# Patient Record
Sex: MALE | Race: WHITE | NOT HISPANIC OR LATINO | Employment: OTHER | URBAN - METROPOLITAN AREA
[De-identification: names, ages, dates, MRNs, and addresses within clinical notes are randomized per-mention and may not be internally consistent; named-entity substitution may affect disease eponyms.]

---

## 2017-01-17 ENCOUNTER — OFFICE VISIT (OUTPATIENT)
Dept: ORTHOPEDIC SURGERY | Facility: CLINIC | Age: 57
End: 2017-01-17

## 2017-01-17 DIAGNOSIS — Z96.651 STATUS POST TOTAL RIGHT KNEE REPLACEMENT: Primary | ICD-10-CM

## 2017-01-17 PROCEDURE — 99024 POSTOP FOLLOW-UP VISIT: CPT | Performed by: ORTHOPAEDIC SURGERY

## 2017-01-17 RX ORDER — AMLODIPINE BESYLATE AND BENAZEPRIL HYDROCHLORIDE 10; 40 MG/1; MG/1
1 CAPSULE ORAL
COMMUNITY
End: 2017-12-28 | Stop reason: SDUPTHER

## 2017-01-17 NOTE — LETTER
January 17, 2017     Lalito Hansen MD  [address] [facility] [address]    Patient: Bam Negron   YOB: 1960   Date of Visit: 1/17/2017       Dear Dr. Tyrone MD:    Thank you for referring Bam Negron to me for evaluation. Below are the relevant portions of my assessment and plan of care.    If you have questions, please do not hesitate to call me. I look forward to following Bam along with you.         Sincerely,        Roger Stone MD        CC: No Recipients  Roger Stone MD  1/17/2017 11:07 AM  Signed  CC: s/p right total knee arthroplasty, DOS 10/17/2016  Interval History: Bam Negrno returns for 12 week postoperative visit.  He is doing well. Pain is controlled with minimal issues, mild residual pain over anterior knee and lateral retinaculum, rates as 1-2/10, intermittent. No pain medication needed.  He denies any wound problem, fevers, or chills. Patient is continuing to work with outpatient PT. Ambulating without cane.     Physical Examination: Right knee was examined   Incision well healed   ROM 0-125,  4+/5 strength   Stable to varus and valgus stress   Flex/extend ankle and toes   Positive sensation right foot   No calf pain, negative Homans sign bilaterally      Assessment/Plan:  Bam Negron is recovering from surgery as expected.  We will continue home exercises for range of motion, strengthening, and gait normalization. .  He is to follow up in clinic in 3 months with xrays. Patient had all question answered today. Discussed need for prophylactic antibiotics with dental/endoscopy procedures.     Medications:  No orders of the defined types were placed in this encounter.      Roger Stone MD  1/17/2017 11:01 AM

## 2017-01-17 NOTE — MR AVS SNAPSHOT
Bam Negron   1/17/2017 10:30 AM   Office Visit    Dept Phone:  [phone]   Encounter #:  [id number]    Provider:  Roger Stone MD   Department:  [facility] ORTHOPEDICS                Your Full Care Plan              Your Updated Medication List          This list is accurate as of: 1/17/17 11:10 AM.  Always use your most recent med list.                albuterol 108 (90 BASE) MCG/ACT inhaler   Commonly known as:  PROVENTIL HFA;VENTOLIN HFA   Inhale 2 puffs every 4 (four) hours as needed for wheezing.       amLODIPine-benazepril 10-40 MG per capsule   Commonly known as:  LOTREL       aspirin 81 MG EC tablet       atorvastatin 40 MG tablet   Commonly known as:  LIPITOR   Take 1 tablet by mouth Every Night for 30 days.       carvedilol 12.5 MG tablet   Commonly known as:  COREG   Take 1 tablet by mouth 2 (Two) Times a Day With Meals for 90 days. Indications: Disease of the Muscle of the Heart       DULoxetine 60 MG capsule   Commonly known as:  CYMBALTA   Take 1 capsule by mouth Every Night for 90 days. Indications: Musculoskeletal Pain       glipiZIDE 10 MG tablet   Commonly known as:  GLUCOTROL   Take 1 tablet by mouth 2 (Two) Times a Day for 90 days. Indications: Type 2 Diabetes       glucose blood test strip   Use as instructed to test blood sugar 3 times daily.       Insulin Pen Needle 31G X 5 MM misc       Liraglutide 18 MG/3ML solution pen-injector   Commonly known as:  VICTOZA   Inject 1.8 mg under the skin Daily for 30 days.       melatonin 5 MG tablet tablet       metFORMIN 1000 MG tablet   Commonly known as:  GLUCOPHAGE   Take 1 tablet by mouth 2 (Two) Times a Day With Meals for 90 days. TAKE WITH FOOD   Indications: Type 2 Diabetes       ONETOUCH DELICA LANCETS 33G misc   Use as directed to test blood sugar 3 times daily.       predniSONE 10 MG tablet   Commonly known as:  DELTASONE   Take 1 tablet by mouth 2 (Two) Times a Day.       QC MULTI-DEYSI 50  & OVER PO               You Were Diagnosed With        Codes Comments    Status post total right knee replacement    -  Primary ICD-10-CM: Z96.651  ICD-9-CM: V43.65       Instructions     None    Patient Instructions History      Upcoming Appointments     Visit Type Date Time Department    FOLLOW UP 1/17/2017 10:30 AM [facility]    FOLLOW UP 4/18/2017 10:45 AM [facility]    [facility] 6/13/2017  8:20 AM [facility]    FOLLOW UP 6/20/2017  2:15 PM [facility]      MyChart Signup     Our records indicate that you have an active [facility] [employer] account.    You can view your After Visit Summary by going to [employer] and logging in with your [employer] username and password.  If you don't have a [employer] username and password but a parent or guardian has access to your record, the parent or guardian should login with their own [employer] username and password and access your record to view the After Visit Summary.    If you have questions, you can email [email or web address] or call [phone] to talk to our [employer] staff.  Remember, [employer] is NOT to be used for urgent needs.  For medical emergencies, dial 911.               Other Info from Your Visit           Your Appointments     Apr 18, 2017 10:45 AM EDT   Follow Up with Roger Stone MD   [facility] (--)    [address]  [facility] [id number] [phone]           Arrive 15 minutes prior to appointment.            Jun 13, 2017  8:20 AM EDT   [facility]   [facility] (--)    [address]  [place] [address] [phone]            Jun 20, 2017  2:15 PM EDT   Follow Up with Lalito Hansen MD   [facility] (--)    [address]  [place] [id number] [phone]           Arrive 15 minutes prior to appointment.              Allergies     No  Known Allergies      Reason for Visit     Right Knee - Follow-up           Vital Signs     Smoking Status                   Former Smoker           Problems and Diagnoses Noted     Status post total right knee replacement

## 2017-01-17 NOTE — PROGRESS NOTES
CC: s/p right total knee arthroplasty, DOS 10/17/2016  Interval History: Bam Negron returns for 12 week postoperative visit.  He is doing well. Pain is controlled with minimal issues, mild residual pain over anterior knee and lateral retinaculum, rates as 1-2/10, intermittent. No pain medication needed.  He denies any wound problem, fevers, or chills. Patient is continuing to work with outpatient PT. Ambulating without cane.     Physical Examination: Right knee was examined   Incision well healed   ROM 0-125,  4+/5 strength   Stable to varus and valgus stress   Flex/extend ankle and toes   Positive sensation right foot   No calf pain, negative Homans sign bilaterally      Assessment/Plan:  Bam Negron is recovering from surgery as expected.  We will continue home exercises for range of motion, strengthening, and gait normalization. .  He is to follow up in clinic in 3 months with xrays. Patient had all question answered today. Discussed need for prophylactic antibiotics with dental/endoscopy procedures.     Medications:  No orders of the defined types were placed in this encounter.      Roger Stone MD  1/17/2017 11:01 AM

## 2017-03-23 ENCOUNTER — DOCUMENTATION (OUTPATIENT)
Dept: PHYSICAL THERAPY | Facility: HOSPITAL | Age: 57
End: 2017-03-23

## 2017-03-23 DIAGNOSIS — Z96.651 STATUS POST TOTAL KNEE REPLACEMENT, RIGHT: Primary | ICD-10-CM

## 2017-03-23 NOTE — THERAPY DISCHARGE NOTE
Outpatient Physical Therapy Ortho Progress Note/Discharge Summary       Patient Name: Bam Negron  : 1960  MRN: [id number]  Today's Date: 3/23/2017      Visit Date: 2017    Visit Dx:    ICD-10-CM ICD-9-CM   1. Status post total knee replacement, right Z96.651 V43.65       Patient Active Problem List   Diagnosis   • Atopic rhinitis   • Cataract   • Simple chronic bronchitis   • Chronic coronary artery disease   • Depression   • Mixed hyperlipidemia   • Essential hypertension   • Hypogonadism in male   • Macular degeneration   • Obesity (BMI 30-39.9)   • Obstructive sleep apnea syndrome   • Type 2 diabetes mellitus without complication, without long-term current use of insulin   • Primary osteoarthritis of both knees   • Old myocardial infarction   • Allergic rhinitis due to animal hair and dander   • Medication monitoring encounter   • Abnormal electrocardiogram   • Premature atrial contraction   • Diastolic dysfunction   • Encounter for pre-operative cardiovascular clearance   • Status post total right knee replacement   • Lumbar herniated disc   • Acute blood loss as cause of postoperative anemia   • Screening for prostate cancer        Past Medical History:   Diagnosis Date   • Anxiety    • Asthma    • Blood type O+ 10/04/2016    By lab confirmation   • Cervical disc disorder    • Coronary artery disease     Cardiac catheterization 2011 severe single coronary artery disease of the left anterior descending completely occluded with good collateralization and also showed moderate left ventricular diastolic dysfunction   • Depression    • Diabetes mellitus    • Diastolic dysfunction     Demonstrated on cardiac cath    • History of broken collarbone     bilateral   • Hypertension    • Hypogonadism male    • Left knee DJD    • Lumbar herniated disc     L3 & L5, h/o lumbar epidurals   • Myocardial infarction     Anterior MI per electrocardiogram, Dr Baron follows   • Nose  abnormality     not sure what, but told needs surgery to correct, Dr Thompson (ENT)   • Obstructive sleep apnea on CPAP 07/09/2013    Sleep Study completed by Dr. Bennett   • Right knee DJD     sched replacement   • Sciatic pain     right leg   • Small bowel obstruction 2013        Past Surgical History:   Procedure Laterality Date   • CARDIAC CATHETERIZATION  12/27/2011   • CERVICAL FUSION  12/31/2014    cervical fusion   • COLONOSCOPY  03/2010   • COLONOSCOPY N/A 9/23/2016    Procedure: COLONOSCOPY with polypectomy/tattooing;  Surgeon: Jonna Osborn MD;  Location:  [facility];  Service:    • EPIDURAL      lumbar epidurals   • PILONIDAL CYSTECTOMY      x4   • TX TOTAL KNEE ARTHROPLASTY Right 10/17/2016    Procedure: TOTAL KNEE ARTHROPLASTY  KRIS--ANTIBIOTIC CEMENT ;  Surgeon: Roger Stone MD;  Location:  [facility] OR;  Service: Orthopedics                             PT Assessment/Plan       03/23/17 0500       PT Assessment    Assessment Comments Pt is doing well with all goals met.   -GC     PT Plan    PT Plan Comments Pt is to continue his HEP 3-4x weekly for maintenance purposes.  -       User Key  (r) = Recorded By, (t) = Taken By, (c) = Cosigned By    Initials Name Provider Type    EV Flores, PT Physical Therapist                                       PT OP Goals       03/23/17 0500       PT Short Term Goals    STG Date to Achieve 11/23/16  -GC     STG 1 Decrease right knee pain to 2-3/10 with activity.  -GC     STG 1 Progress Met  -GC     STG 2 Increase right knee ROM to 5-110 degrees with testing.  -GC     STG 2 Progress Met  -GC     STG 3 Decrease right knee edema to minimal level.  -GC     STG 3 Progress Met  -GC     STG 4 Pt will be independent with his HEP.  -     STG 4 Progress Met  -     Long Term Goals    LTG Date to Achieve 12/14/16  -GC     LTG 1 Decrease right knee pain to 0-1/10 with activity.  -     LTG 1 Progress Met  -     LTG 2 Increase right knee ROM to 0-120 degrees with  testing.  -GC     LTG 2 Progress Partially Met  -GC     LTG 3 Increase right LE strength to 5/5 all planes with testing.  -GC     LTG 3 Progress Met  -GC     LTG 4 Restore normal gait on levels and stairs without assistive device.  -GC     LTG 4 Progress Met  -GC     LTG 5 Pt will be independent with all ADLs and have a LEFS score > 60.  -     LTG 5 Progress Partially Met  -GC       User Key  (r) = Recorded By, (t) = Taken By, (c) = Cosigned By    Initials Name Provider Type    EV Flores, PT Physical Therapist                    Time Calculation:                  OP PT Discharge Summary  Reason for Discharge: All goals achieved  Outcomes Achieved: Able to achieve all goals within established timeline  Discharge Destination: Home with home program      Kishor Flores, PT  3/23/2017

## 2017-05-13 ENCOUNTER — RESULTS ENCOUNTER (OUTPATIENT)
Dept: FAMILY MEDICINE CLINIC | Facility: CLINIC | Age: 57
End: 2017-05-13

## 2017-05-13 DIAGNOSIS — Z51.81 MEDICATION MONITORING ENCOUNTER: ICD-10-CM

## 2017-05-13 DIAGNOSIS — I10 ESSENTIAL HYPERTENSION: ICD-10-CM

## 2017-05-13 DIAGNOSIS — Z12.5 SCREENING FOR PROSTATE CANCER: ICD-10-CM

## 2017-05-13 DIAGNOSIS — E11.9 TYPE 2 DIABETES MELLITUS WITHOUT COMPLICATION, WITHOUT LONG-TERM CURRENT USE OF INSULIN (HCC): ICD-10-CM

## 2017-05-13 DIAGNOSIS — E78.2 MIXED HYPERLIPIDEMIA: ICD-10-CM

## 2017-05-13 DIAGNOSIS — D62 ACUTE BLOOD LOSS AS CAUSE OF POSTOPERATIVE ANEMIA: ICD-10-CM

## 2017-06-14 LAB
ALT SERPL-CCNC: 26 U/L (ref 5–41)
APPEARANCE UR: CLEAR
BILIRUB UR QL STRIP: NEGATIVE
BUN SERPL-MCNC: 16 MG/DL (ref 6–20)
BUN/CREAT SERPL: 22.5 (ref 7–25)
CALCIUM SERPL-MCNC: 9 MG/DL (ref 8.6–10.5)
CHLORIDE SERPL-SCNC: 98 MMOL/L (ref 98–107)
CHOLEST SERPL-MCNC: 158 MG/DL (ref 0–200)
CO2 SERPL-SCNC: 27.5 MMOL/L (ref 22–29)
COLOR UR: YELLOW
CREAT SERPL-MCNC: 0.71 MG/DL (ref 0.76–1.27)
ERYTHROCYTE [DISTWIDTH] IN BLOOD BY AUTOMATED COUNT: 13.4 % (ref 11.5–14.5)
GLUCOSE SERPL-MCNC: 101 MG/DL (ref 65–99)
GLUCOSE UR QL: NEGATIVE
HBA1C MFR BLD: 7.2 % (ref 4.8–5.6)
HCT VFR BLD AUTO: 40.2 % (ref 42–52)
HDLC SERPL-MCNC: 41 MG/DL (ref 40–60)
HGB BLD-MCNC: 13.8 G/DL (ref 14–18)
HGB UR QL STRIP: NEGATIVE
KETONES UR QL STRIP: NEGATIVE
LDLC SERPL CALC-MCNC: 83 MG/DL (ref 0–100)
LEUKOCYTE ESTERASE UR QL STRIP: NEGATIVE
MCH RBC QN AUTO: 30.5 PG (ref 27–31)
MCHC RBC AUTO-ENTMCNC: 34.3 G/DL (ref 31–37)
MCV RBC AUTO: 88.9 FL (ref 80–94)
MICROALBUMIN UR-MCNC: 4.2 UG/ML
NITRITE UR QL STRIP: NEGATIVE
PH UR STRIP: 7 [PH] (ref 4.5–8)
PLATELET # BLD AUTO: 322 10*3/MM3 (ref 140–500)
POTASSIUM SERPL-SCNC: 4.1 MMOL/L (ref 3.5–5.2)
PROT UR QL STRIP: NEGATIVE
PSA SERPL-MCNC: 0.17 NG/ML (ref 0–4)
RBC # BLD AUTO: 4.52 10*6/MM3 (ref 4.7–6.1)
SODIUM SERPL-SCNC: 139 MMOL/L (ref 136–145)
SP GR UR: 1.02 (ref 1–1.03)
TRIGL SERPL-MCNC: 170 MG/DL (ref 0–150)
TSH SERPL DL<=0.005 MIU/L-ACNC: 0.98 MIU/ML (ref 0.27–4.2)
UROBILINOGEN UR STRIP-MCNC: NORMAL MG/DL
VLDLC SERPL CALC-MCNC: 34 MG/DL (ref 8–32)
WBC # BLD AUTO: 7.82 10*3/MM3 (ref 4.8–10.8)

## 2017-06-20 ENCOUNTER — OFFICE VISIT (OUTPATIENT)
Dept: FAMILY MEDICINE CLINIC | Facility: CLINIC | Age: 57
End: 2017-06-20

## 2017-06-20 VITALS
WEIGHT: 251.8 LBS | SYSTOLIC BLOOD PRESSURE: 140 MMHG | DIASTOLIC BLOOD PRESSURE: 80 MMHG | HEIGHT: 68 IN | OXYGEN SATURATION: 95 % | BODY MASS INDEX: 38.16 KG/M2 | HEART RATE: 84 BPM | TEMPERATURE: 98 F

## 2017-06-20 DIAGNOSIS — E11.9 TYPE 2 DIABETES MELLITUS WITHOUT COMPLICATION, WITHOUT LONG-TERM CURRENT USE OF INSULIN (HCC): ICD-10-CM

## 2017-06-20 DIAGNOSIS — I10 ESSENTIAL HYPERTENSION: ICD-10-CM

## 2017-06-20 DIAGNOSIS — E78.2 MIXED HYPERLIPIDEMIA: Primary | ICD-10-CM

## 2017-06-20 DIAGNOSIS — Z51.81 MEDICATION MONITORING ENCOUNTER: ICD-10-CM

## 2017-06-20 PROCEDURE — 99213 OFFICE O/P EST LOW 20 MIN: CPT | Performed by: FAMILY MEDICINE

## 2017-06-20 RX ORDER — CARVEDILOL 12.5 MG/1
TABLET ORAL 2 TIMES DAILY
COMMUNITY
Start: 2017-05-14 | End: 2017-12-28 | Stop reason: DRUGHIGH

## 2017-06-20 RX ORDER — NAPROXEN SODIUM 220 MG
440 TABLET ORAL
COMMUNITY

## 2017-06-20 NOTE — PROGRESS NOTES
"  Chief Complaint   Patient presents with   • Follow-up   • Hyperlipidemia   • Hypertension   • Diabetes       Subjective   Bam Negron is an 56 y.o. male who presents for follow up of diabetes. Current symptoms include: hyperglycemia. Patient denies foot ulcerations, hypoglycemia , increased appetite, nausea, paresthesia of the feet, polydipsia, polyuria, visual disturbances, vomiting and weight loss. Evaluation to date has included: fasting blood sugar, fasting lipid panel, hemoglobin A1C and microalbuminuria. Home sugars: patient does not check sugars. Current treatments: more intensive attention to diet which has been not very effective, low cholesterol diet which has been not very effective, increased aerobic exercise which has been not very effective, Continued sulfonylurea which has been somewhat effective, Continued metformin which has been somewhat effective, Continued statin which has been somewhat effective and Continued ACE inhibitor/ARB which has been somewhat effective. Last dilated eye exam 2016.    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      The following portions of the patient's history were reviewed and updated as appropriate: allergies, current medications, past family history, past medical history, past social history, past surgical history and problem list.        Review of Systems  Pertinent items are noted in HPI.     Vitals:    06/20/17 1418   BP: 140/80   BP Location: Left arm   Patient Position: Sitting   Pulse: 84   Temp: 98 °F (36.7 °C)   SpO2: 95%   Weight: 251 lb 12.8 oz (114 kg)   Height: 68\" (172.7 cm)       Objective    Gen:  Alert, pleasant  Ears: canals clear, TMs normal  Throat: clear , no thrush, teeth ok  Neck: no bruit, no LAD  Lungs: clear  Heart: RR no murmur  Feet:  No rash, no skin breakdown, sensation grossly normal.    Laboratory:  Results for orders placed or performed in visit " on 05/13/17   MicroAlbumin, Urine, Random   Result Value Ref Range    Microalbumin, Urine 4.2 Not Estab. ug/mL   ALT   Result Value Ref Range    ALT (SGPT) 26 5 - 41 U/L   Lipid Panel   Result Value Ref Range    Total Cholesterol 158 0 - 200 mg/dL    Triglycerides 170 (H) 0 - 150 mg/dL    HDL Cholesterol 41 40 - 60 mg/dL    VLDL Cholesterol 34 (H) 8 - 32 mg/dL    LDL Cholesterol  83 0 - 100 mg/dL   PSA   Result Value Ref Range    PSA 0.169 0.000 - 4.000 ng/mL   Basic Metabolic Panel   Result Value Ref Range    Glucose 101 (H) 65 - 99 mg/dL    BUN 16 6 - 20 mg/dL    Creatinine 0.71 (L) 0.76 - 1.27 mg/dL    eGFR Non African Am 115 >60 mL/min/1.73    eGFR African Am 139 >60 mL/min/1.73    BUN/Creatinine Ratio 22.5 7.0 - 25.0    Sodium 139 136 - 145 mmol/L    Potassium 4.1 3.5 - 5.2 mmol/L    Chloride 98 98 - 107 mmol/L    Total CO2 27.5 22.0 - 29.0 mmol/L    Calcium 9.0 8.6 - 10.5 mg/dL   CBC (No Diff)   Result Value Ref Range    WBC 7.82 4.80 - 10.80 10*3/mm3    RBC 4.52 (L) 4.70 - 6.10 10*6/mm3    Hemoglobin 13.8 (L) 14.0 - 18.0 g/dL    Hematocrit 40.2 (L) 42.0 - 52.0 %    MCV 88.9 80.0 - 94.0 fL    MCH 30.5 27.0 - 31.0 pg    MCHC 34.3 31.0 - 37.0 g/dL    RDW 13.4 11.5 - 14.5 %    Platelets 322 140 - 500 10*3/mm3   TSH   Result Value Ref Range    TSH 0.978 0.270 - 4.200 mIU/mL   Hemoglobin A1c   Result Value Ref Range    Hemoglobin A1C 7.20 (H) 4.80 - 5.60 %   Urinalysis With / Microscopic If Indicated   Result Value Ref Range    Specific Gravity, UA 1.020 1.003 - 1.030    pH, UA 7.0 4.5 - 8.0    Color, UA Yellow     Appearance, UA Clear Clear    Leukocytes, UA Negative Negative    Protein Negative Negative    Glucose, UA Negative Negative    Ketones Negative     Blood, UA Negative Negative    Bilirubin, UA Negative Negative    Urobilinogen, UA Comment     Nitrite, UA Negative Negative        Assessment/Plan        Discussed general issues about diabetes pathophysiology and management.  Addressed ADA diet.  Suggested  low cholesterol diet.  Encouraged aerobic exercise.  Discussed foot care.  Reminded to get yearly retinal exam.  Continued sulfonylurea; see medication orders.   Continued metformin; see  medication orders.  Continued statin drug see medication orders.  Continued ACE inhibitor; see medication orders.  Follow up in 6 months or as needed.    Bam was seen today for follow-up, hyperlipidemia, hypertension and diabetes.    Diagnoses and all orders for this visit:    Mixed hyperlipidemia  -     Lipid Panel; Future    Essential hypertension    Type 2 diabetes mellitus without complication, without long-term current use of insulin  -     MicroAlbumin, Urine, Random; Future  -     Basic Metabolic Panel; Future  -     Hemoglobin A1c; Future    Medication monitoring encounter  -     ALT; Future        Return in about 6 months (around 12/20/2017) for diabetes, Annual physical, blood pressure.  There are no Patient Instructions on file for this visit.  Medications Discontinued During This Encounter   Medication Reason   • predniSONE (DELTASONE) 10 MG tablet          Dr. Lalito Hansen MD  [place], [place]  [facility].

## 2017-10-11 RX ORDER — AMLODIPINE BESYLATE AND BENAZEPRIL HYDROCHLORIDE 10; 40 MG/1; MG/1
CAPSULE ORAL
Qty: 90 CAPSULE | Refills: 0 | Status: SHIPPED | OUTPATIENT
Start: 2017-10-11 | End: 2017-12-28 | Stop reason: SDUPTHER

## 2017-10-20 RX ORDER — ATORVASTATIN CALCIUM 40 MG/1
TABLET, FILM COATED ORAL
Qty: 90 TABLET | Refills: 0 | Status: SHIPPED | OUTPATIENT
Start: 2017-10-20 | End: 2017-12-28 | Stop reason: DRUGHIGH

## 2017-11-20 ENCOUNTER — RESULTS ENCOUNTER (OUTPATIENT)
Dept: FAMILY MEDICINE CLINIC | Facility: CLINIC | Age: 57
End: 2017-11-20

## 2017-11-20 DIAGNOSIS — Z51.81 MEDICATION MONITORING ENCOUNTER: ICD-10-CM

## 2017-11-20 DIAGNOSIS — E78.2 MIXED HYPERLIPIDEMIA: ICD-10-CM

## 2017-11-20 DIAGNOSIS — E11.9 TYPE 2 DIABETES MELLITUS WITHOUT COMPLICATION, WITHOUT LONG-TERM CURRENT USE OF INSULIN (HCC): ICD-10-CM

## 2017-12-08 LAB
ALT SERPL-CCNC: 25 U/L (ref 5–41)
BUN SERPL-MCNC: 13 MG/DL (ref 6–20)
BUN/CREAT SERPL: 16.3 (ref 7–25)
CALCIUM SERPL-MCNC: 8.8 MG/DL (ref 8.6–10.5)
CHLORIDE SERPL-SCNC: 102 MMOL/L (ref 98–107)
CHOLEST SERPL-MCNC: 122 MG/DL (ref 0–200)
CO2 SERPL-SCNC: 30.4 MMOL/L (ref 22–29)
CREAT SERPL-MCNC: 0.8 MG/DL (ref 0.76–1.27)
GFR SERPLBLD CREATININE-BSD FMLA CKD-EPI: 100 ML/MIN/1.73
GFR SERPLBLD CREATININE-BSD FMLA CKD-EPI: 121 ML/MIN/1.73
GLUCOSE SERPL-MCNC: 138 MG/DL (ref 65–99)
HBA1C MFR BLD: 6.8 % (ref 4.8–5.6)
HDLC SERPL-MCNC: 40 MG/DL (ref 40–60)
LDLC SERPL CALC-MCNC: 54 MG/DL (ref 0–100)
MICROALBUMIN UR-MCNC: 8.8 UG/ML
POTASSIUM SERPL-SCNC: 4.2 MMOL/L (ref 3.5–5.2)
SODIUM SERPL-SCNC: 143 MMOL/L (ref 136–145)
TRIGL SERPL-MCNC: 139 MG/DL (ref 0–150)
VLDLC SERPL CALC-MCNC: 27.8 MG/DL (ref 8–32)

## 2017-12-12 DIAGNOSIS — E11.9 TYPE 2 DIABETES MELLITUS WITHOUT COMPLICATION, WITHOUT LONG-TERM CURRENT USE OF INSULIN (HCC): ICD-10-CM

## 2017-12-12 DIAGNOSIS — M51.26 LUMBAR HERNIATED DISC: ICD-10-CM

## 2017-12-12 RX ORDER — DULOXETIN HYDROCHLORIDE 60 MG/1
CAPSULE, DELAYED RELEASE ORAL
Qty: 90 CAPSULE | Refills: 0 | Status: SHIPPED | OUTPATIENT
Start: 2017-12-12 | End: 2017-12-28 | Stop reason: SDUPTHER

## 2017-12-20 DIAGNOSIS — E11.9 TYPE 2 DIABETES MELLITUS WITHOUT COMPLICATION, WITHOUT LONG-TERM CURRENT USE OF INSULIN (HCC): ICD-10-CM

## 2017-12-20 RX ORDER — GLIPIZIDE 10 MG/1
TABLET ORAL
Qty: 30 TABLET | Refills: 0 | Status: SHIPPED | OUTPATIENT
Start: 2017-12-20 | End: 2017-12-28 | Stop reason: SDUPTHER

## 2017-12-28 ENCOUNTER — OFFICE VISIT (OUTPATIENT)
Dept: FAMILY MEDICINE CLINIC | Facility: CLINIC | Age: 57
End: 2017-12-28

## 2017-12-28 VITALS
SYSTOLIC BLOOD PRESSURE: 120 MMHG | OXYGEN SATURATION: 95 % | HEART RATE: 89 BPM | BODY MASS INDEX: 38.12 KG/M2 | TEMPERATURE: 98.3 F | WEIGHT: 251.5 LBS | HEIGHT: 68 IN | DIASTOLIC BLOOD PRESSURE: 78 MMHG

## 2017-12-28 DIAGNOSIS — Z12.5 SCREENING FOR PROSTATE CANCER: ICD-10-CM

## 2017-12-28 DIAGNOSIS — E11.9 TYPE 2 DIABETES MELLITUS WITHOUT COMPLICATION, WITHOUT LONG-TERM CURRENT USE OF INSULIN (HCC): Primary | ICD-10-CM

## 2017-12-28 DIAGNOSIS — I10 ESSENTIAL HYPERTENSION: ICD-10-CM

## 2017-12-28 DIAGNOSIS — Z51.81 MEDICATION MONITORING ENCOUNTER: ICD-10-CM

## 2017-12-28 DIAGNOSIS — M51.26 LUMBAR HERNIATED DISC: ICD-10-CM

## 2017-12-28 DIAGNOSIS — E78.2 MIXED HYPERLIPIDEMIA: ICD-10-CM

## 2017-12-28 DIAGNOSIS — M19.90 ARTHRITIS: ICD-10-CM

## 2017-12-28 PROCEDURE — 99214 OFFICE O/P EST MOD 30 MIN: CPT | Performed by: FAMILY MEDICINE

## 2017-12-28 RX ORDER — ATORVASTATIN CALCIUM 80 MG/1
80 TABLET, FILM COATED ORAL DAILY
Qty: 90 TABLET | Refills: 3 | Status: SHIPPED | OUTPATIENT
Start: 2017-12-28 | End: 2018-12-17 | Stop reason: SDUPTHER

## 2017-12-28 RX ORDER — CARVEDILOL 25 MG/1
25 TABLET ORAL 2 TIMES DAILY WITH MEALS
Qty: 180 TABLET | Refills: 3 | Status: SHIPPED | OUTPATIENT
Start: 2017-12-28 | End: 2018-12-17 | Stop reason: SDUPTHER

## 2017-12-28 RX ORDER — GLIPIZIDE 10 MG/1
10 TABLET ORAL
Qty: 180 TABLET | Refills: 3 | Status: SHIPPED | OUTPATIENT
Start: 2017-12-28 | End: 2018-12-17

## 2017-12-28 RX ORDER — DULOXETIN HYDROCHLORIDE 60 MG/1
60 CAPSULE, DELAYED RELEASE ORAL DAILY
Qty: 90 CAPSULE | Refills: 3 | Status: SHIPPED | OUTPATIENT
Start: 2017-12-28 | End: 2018-12-17 | Stop reason: SDUPTHER

## 2017-12-28 RX ORDER — AMLODIPINE BESYLATE AND BENAZEPRIL HYDROCHLORIDE 10; 40 MG/1; MG/1
1 CAPSULE ORAL DAILY
Qty: 90 CAPSULE | Refills: 3 | Status: SHIPPED | OUTPATIENT
Start: 2017-12-28 | End: 2018-12-17 | Stop reason: SDUPTHER

## 2017-12-28 RX ORDER — ATORVASTATIN CALCIUM 80 MG/1
80 TABLET, FILM COATED ORAL
COMMUNITY
Start: 2017-10-31 | End: 2017-12-28 | Stop reason: SDUPTHER

## 2017-12-28 RX ORDER — CARVEDILOL 25 MG/1
25 TABLET ORAL
COMMUNITY
Start: 2017-10-31 | End: 2017-12-28 | Stop reason: SDUPTHER

## 2017-12-28 NOTE — PROGRESS NOTES
"  Chief Complaint   Patient presents with   • Follow-up     Lab results    • Hypertension   • Hyperlipidemia   • Diabetes       Subjective   Bam Negron is an 57 y.o. male who presents for follow up of diabetes. Current symptoms include: paresthesia of the feet. Patient denies foot ulcerations, hyperglycemia, hypoglycemia , polydipsia, polyuria, visual disturbances, vomiting and weight loss. Evaluation to date has included: fasting blood sugar, fasting lipid panel, hemoglobin A1C and microalbuminuria. Home sugars: BGs consistently in an acceptable range. Current treatments: more intensive attention to diet which has been ineffective, low cholesterol diet which has been not very effective, increased aerobic exercise which has been ineffective, Continued sulfonylurea which has been effective, Continued metformin which has been effective, Continued statin which has been effective and Continued ACE inhibitor/ARB which has been effective. Last dilated eye exam unsure.    The following portions of the patient's history were reviewed and updated as appropriate: allergies, current medications, past family history, past medical history, past social history and problem list.      Reviewed his labs    Needs refills on all    His general arthritis is getting worse in hands and low back  Wants to be checked for rheumatoid.    Dr Baron increased his coreg and lipitor.        Review of Systems  Musculoskeletal:positive for arthralgias and stiff joints     Vitals:    12/28/17 1049   BP: 120/78   BP Location: Left arm   Patient Position: Sitting   Pulse: 89   Temp: 98.3 °F (36.8 °C)   SpO2: 95%   Weight: 114 kg (251 lb 8 oz)   Height: 172.7 cm (68\")       Objective    Gen:  Alert, pleasant  Ears: canals clear, TMs normal  Throat: clear , no thrush, teeth ok  Neck: no bruit, no LAD  Lungs: clear  Heart: RR no murmur  Feet:  No rash, no skin breakdown, sensation grossly normal.    Laboratory:  Results for orders placed or " performed in visit on 11/20/17   Lipid Panel   Result Value Ref Range    Total Cholesterol 122 0 - 200 mg/dL    Triglycerides 139 0 - 150 mg/dL    HDL Cholesterol 40 40 - 60 mg/dL    VLDL Cholesterol 27.8 8 - 32 mg/dL    LDL Cholesterol  54 0 - 100 mg/dL   MicroAlbumin, Urine, Random   Result Value Ref Range    Microalbumin, Urine 8.8 Not Estab. ug/mL   ALT   Result Value Ref Range    ALT (SGPT) 25 5 - 41 U/L   Basic Metabolic Panel   Result Value Ref Range    Glucose 138 (H) 65 - 99 mg/dL    BUN 13 6 - 20 mg/dL    Creatinine 0.80 0.76 - 1.27 mg/dL    eGFR Non African Am 100 >60 mL/min/1.73    eGFR African Am 121 >60 mL/min/1.73    BUN/Creatinine Ratio 16.3 7.0 - 25.0    Sodium 143 136 - 145 mmol/L    Potassium 4.2 3.5 - 5.2 mmol/L    Chloride 102 98 - 107 mmol/L    Total CO2 30.4 (H) 22.0 - 29.0 mmol/L    Calcium 8.8 8.6 - 10.5 mg/dL   Hemoglobin A1c   Result Value Ref Range    Hemoglobin A1C 6.80 (H) 4.80 - 5.60 %        Assessment/Plan        Discussed general issues about diabetes pathophysiology and management.  Addressed ADA diet.  Suggested low cholesterol diet.  Encouraged aerobic exercise.  Discussed foot care.  Reminded to get yearly retinal exam.  Continued sulfonylurea; see medication orders.   Continued metformin; see  medication orders.  Continued statin drug see medication orders.  Continued ACE inhibitor; see medication orders.  Follow up in 6 months or as needed.    Bam was seen today for follow-up, hypertension, hyperlipidemia and diabetes.    Diagnoses and all orders for this visit:    Type 2 diabetes mellitus without complication, without long-term current use of insulin  -     glipiZIDE (GLUCOTROL) 10 MG tablet; Take 1 tablet by mouth 2 (Two) Times a Day Before Meals. Indications: Type 2 Diabetes  -     Liraglutide (VICTOZA) 18 MG/3ML solution pen-injector injection; Inject 1.8 mg under the skin Daily. Indications: Type 2 Diabetes  -     metFORMIN (GLUCOPHAGE) 1000 MG tablet; Take 1 tablet by  mouth 2 (Two) Times a Day With Meals. Indications: Type 2 Diabetes  -     Insulin Pen Needle 31G X 5 MM misc; 1 Units Every Morning. USE WITH PEN TWICE A DAY AS DIRECTED  -     MicroAlbumin, Urine, Random - Urine, Clean Catch; Future  -     Basic Metabolic Panel; Future  -     Hemoglobin A1c; Future    Essential hypertension  -     amLODIPine-benazepril (LOTREL) 10-40 MG per capsule; Take 1 capsule by mouth Daily. Indications: High Blood Pressure Disorder  -     carvedilol (COREG) 25 MG tablet; Take 1 tablet by mouth 2 (Two) Times a Day With Meals. Indications: Disease of the Muscle of the Heart  -     CBC (No Diff); Future    Lumbar herniated disc  -     DULoxetine (CYMBALTA) 60 MG capsule; Take 1 capsule by mouth Daily. Indications: Major Depressive Disorder, Musculoskeletal Pain    Mixed hyperlipidemia  -     atorvastatin (LIPITOR) 80 MG tablet; Take 1 tablet by mouth Daily. Indications: High Amount of Fats in the Blood  -     Lipid Panel; Future  -     TSH; Future    Arthritis  -     RUBEN With / DsDNA, RNP, Sjogrens A / B, [place]; Future  -     Sedimentation Rate; Future  -     Rheumatoid Factor, Quant; Future  -     CK; Future    Medication monitoring encounter  -     ALT; Future  -     Basic Metabolic Panel; Future    Screening for prostate cancer  -     PSA Screen; Future        Return in about 6 months (around 6/28/2018) for blood pressure, diabetes.  There are no Patient Instructions on file for this visit.  Medications Discontinued During This Encounter   Medication Reason   • amLODIPine-benazepril (LOTREL) 10-40 MG per capsule Duplicate order   • carvedilol (COREG) 12.5 MG tablet Dose adjustment   • atorvastatin (LIPITOR) 40 MG tablet Dose adjustment   • amLODIPine-benazepril (LOTREL) 10-40 MG per capsule Reorder   • atorvastatin (LIPITOR) 80 MG tablet Reorder   • carvedilol (COREG) 25 MG tablet Reorder   • DULoxetine (CYMBALTA) 60 MG capsule Reorder   • glipiZIDE (GLUCOTROL) 10 MG tablet Reorder   •  Liraglutide (VICTOZA) 18 MG/3ML solution pen-injector Reorder   • metFORMIN (GLUCOPHAGE) 1000 MG tablet Reorder   • Insulin Pen Needle 31G X 5 MM misc Reorder         Dr. Lalito Hansen MD  [place], [place]  [facility].

## 2018-05-30 ENCOUNTER — APPOINTMENT (OUTPATIENT)
Dept: PREADMISSION TESTING | Facility: HOSPITAL | Age: 58
End: 2018-05-30

## 2018-05-30 VITALS
TEMPERATURE: 97.9 F | OXYGEN SATURATION: 97 % | RESPIRATION RATE: 16 BRPM | SYSTOLIC BLOOD PRESSURE: 136 MMHG | DIASTOLIC BLOOD PRESSURE: 81 MMHG | HEIGHT: 68 IN | BODY MASS INDEX: 38.19 KG/M2 | WEIGHT: 252 LBS | HEART RATE: 89 BPM

## 2018-05-30 LAB
ALBUMIN SERPL-MCNC: 3.8 G/DL (ref 3.5–5.2)
ALBUMIN/GLOB SERPL: 1.4 G/DL
ALP SERPL-CCNC: 63 U/L (ref 39–117)
ALT SERPL W P-5'-P-CCNC: 25 U/L (ref 1–41)
ANION GAP SERPL CALCULATED.3IONS-SCNC: 13.7 MMOL/L
AST SERPL-CCNC: 18 U/L (ref 1–40)
BILIRUB SERPL-MCNC: 0.5 MG/DL (ref 0.1–1.2)
BUN BLD-MCNC: 16 MG/DL (ref 6–20)
BUN/CREAT SERPL: 16.8 (ref 7–25)
CALCIUM SPEC-SCNC: 9.3 MG/DL (ref 8.6–10.5)
CHLORIDE SERPL-SCNC: 99 MMOL/L (ref 98–107)
CO2 SERPL-SCNC: 27.3 MMOL/L (ref 22–29)
CREAT BLD-MCNC: 0.95 MG/DL (ref 0.76–1.27)
DEPRECATED RDW RBC AUTO: 43.6 FL (ref 37–54)
ERYTHROCYTE [DISTWIDTH] IN BLOOD BY AUTOMATED COUNT: 13.3 % (ref 11.5–14.5)
GFR SERPL CREATININE-BSD FRML MDRD: 82 ML/MIN/1.73
GLOBULIN UR ELPH-MCNC: 2.7 GM/DL
GLUCOSE BLD-MCNC: 130 MG/DL (ref 65–99)
HCT VFR BLD AUTO: 42.1 % (ref 40.4–52.2)
HGB BLD-MCNC: 14.5 G/DL (ref 13.7–17.6)
MCH RBC QN AUTO: 31.1 PG (ref 27–32.7)
MCHC RBC AUTO-ENTMCNC: 34.4 G/DL (ref 32.6–36.4)
MCV RBC AUTO: 90.3 FL (ref 79.8–96.2)
PLATELET # BLD AUTO: 307 10*3/MM3 (ref 140–500)
PMV BLD AUTO: 8.6 FL (ref 6–12)
POTASSIUM BLD-SCNC: 4.3 MMOL/L (ref 3.5–5.2)
PROT SERPL-MCNC: 6.5 G/DL (ref 6–8.5)
RBC # BLD AUTO: 4.66 10*6/MM3 (ref 4.6–6)
SODIUM BLD-SCNC: 140 MMOL/L (ref 136–145)
WBC NRBC COR # BLD: 9.27 10*3/MM3 (ref 4.5–10.7)

## 2018-05-30 PROCEDURE — 36415 COLL VENOUS BLD VENIPUNCTURE: CPT

## 2018-05-30 PROCEDURE — 93005 ELECTROCARDIOGRAM TRACING: CPT

## 2018-05-30 PROCEDURE — 80053 COMPREHEN METABOLIC PANEL: CPT | Performed by: OTOLARYNGOLOGY

## 2018-05-30 PROCEDURE — 93010 ELECTROCARDIOGRAM REPORT: CPT | Performed by: INTERNAL MEDICINE

## 2018-05-30 PROCEDURE — 85027 COMPLETE CBC AUTOMATED: CPT | Performed by: OTOLARYNGOLOGY

## 2018-06-06 ENCOUNTER — HOSPITAL ENCOUNTER (OUTPATIENT)
Facility: HOSPITAL | Age: 58
Setting detail: HOSPITAL OUTPATIENT SURGERY
Discharge: HOME | End: 2018-06-06
Attending: OTOLARYNGOLOGY | Admitting: OTOLARYNGOLOGY

## 2018-06-06 ENCOUNTER — ANESTHESIA (OUTPATIENT)
Dept: PERIOP | Facility: HOSPITAL | Age: 58
End: 2018-06-06

## 2018-06-06 ENCOUNTER — ANESTHESIA EVENT (OUTPATIENT)
Dept: PERIOP | Facility: HOSPITAL | Age: 58
End: 2018-06-06

## 2018-06-06 VITALS
DIASTOLIC BLOOD PRESSURE: 71 MMHG | HEART RATE: 68 BPM | SYSTOLIC BLOOD PRESSURE: 118 MMHG | TEMPERATURE: 98 F | RESPIRATION RATE: 18 BRPM | OXYGEN SATURATION: 94 %

## 2018-06-06 DIAGNOSIS — J34.89 NASAL OBSTRUCTION: ICD-10-CM

## 2018-06-06 LAB — GLUCOSE BLDC GLUCOMTR-MCNC: 121 MG/DL (ref 70–130)

## 2018-06-06 PROCEDURE — 25010000002 FENTANYL CITRATE (PF) 100 MCG/2ML SOLUTION: Performed by: NURSE ANESTHETIST, CERTIFIED REGISTERED

## 2018-06-06 PROCEDURE — 88302 TISSUE EXAM BY PATHOLOGIST: CPT | Performed by: OTOLARYNGOLOGY

## 2018-06-06 PROCEDURE — 25010000002 ONDANSETRON PER 1 MG: Performed by: NURSE ANESTHETIST, CERTIFIED REGISTERED

## 2018-06-06 PROCEDURE — 25010000002 NEOSTIGMINE PER 0.5 MG: Performed by: NURSE ANESTHETIST, CERTIFIED REGISTERED

## 2018-06-06 PROCEDURE — 25010000002 PHENYLEPHRINE PER 1 ML: Performed by: NURSE ANESTHETIST, CERTIFIED REGISTERED

## 2018-06-06 PROCEDURE — 88304 TISSUE EXAM BY PATHOLOGIST: CPT | Performed by: OTOLARYNGOLOGY

## 2018-06-06 PROCEDURE — 25010000002 MIDAZOLAM PER 1 MG: Performed by: ANESTHESIOLOGY

## 2018-06-06 PROCEDURE — 82962 GLUCOSE BLOOD TEST: CPT

## 2018-06-06 PROCEDURE — 88311 DECALCIFY TISSUE: CPT | Performed by: OTOLARYNGOLOGY

## 2018-06-06 PROCEDURE — 25010000002 PROPOFOL 10 MG/ML EMULSION: Performed by: NURSE ANESTHETIST, CERTIFIED REGISTERED

## 2018-06-06 RX ORDER — ROCURONIUM BROMIDE 10 MG/ML
INJECTION, SOLUTION INTRAVENOUS AS NEEDED
Status: DISCONTINUED | OUTPATIENT
Start: 2018-06-06 | End: 2018-06-06 | Stop reason: SURG

## 2018-06-06 RX ORDER — PROPOFOL 10 MG/ML
VIAL (ML) INTRAVENOUS AS NEEDED
Status: DISCONTINUED | OUTPATIENT
Start: 2018-06-06 | End: 2018-06-06 | Stop reason: SURG

## 2018-06-06 RX ORDER — HYDRALAZINE HYDROCHLORIDE 20 MG/ML
5 INJECTION INTRAMUSCULAR; INTRAVENOUS
Status: DISCONTINUED | OUTPATIENT
Start: 2018-06-06 | End: 2018-06-06 | Stop reason: HOSPADM

## 2018-06-06 RX ORDER — DIPHENHYDRAMINE HYDROCHLORIDE 50 MG/ML
12.5 INJECTION INTRAMUSCULAR; INTRAVENOUS
Status: DISCONTINUED | OUTPATIENT
Start: 2018-06-06 | End: 2018-06-06 | Stop reason: HOSPADM

## 2018-06-06 RX ORDER — BACITRACIN ZINC 500 [USP'U]/G
OINTMENT TOPICAL AS NEEDED
Status: DISCONTINUED | OUTPATIENT
Start: 2018-06-06 | End: 2018-06-06 | Stop reason: HOSPADM

## 2018-06-06 RX ORDER — HYDROMORPHONE HYDROCHLORIDE 1 MG/ML
0.5 INJECTION, SOLUTION INTRAMUSCULAR; INTRAVENOUS; SUBCUTANEOUS
Status: DISCONTINUED | OUTPATIENT
Start: 2018-06-06 | End: 2018-06-06 | Stop reason: HOSPADM

## 2018-06-06 RX ORDER — SODIUM CHLORIDE, SODIUM LACTATE, POTASSIUM CHLORIDE, CALCIUM CHLORIDE 600; 310; 30; 20 MG/100ML; MG/100ML; MG/100ML; MG/100ML
9 INJECTION, SOLUTION INTRAVENOUS CONTINUOUS
Status: DISCONTINUED | OUTPATIENT
Start: 2018-06-06 | End: 2018-06-06 | Stop reason: HOSPADM

## 2018-06-06 RX ORDER — SODIUM CHLORIDE 0.9 % (FLUSH) 0.9 %
1-10 SYRINGE (ML) INJECTION AS NEEDED
Status: DISCONTINUED | OUTPATIENT
Start: 2018-06-06 | End: 2018-06-06 | Stop reason: HOSPADM

## 2018-06-06 RX ORDER — PROMETHAZINE HYDROCHLORIDE 25 MG/ML
12.5 INJECTION, SOLUTION INTRAMUSCULAR; INTRAVENOUS ONCE AS NEEDED
Status: DISCONTINUED | OUTPATIENT
Start: 2018-06-06 | End: 2018-06-06 | Stop reason: HOSPADM

## 2018-06-06 RX ORDER — LIDOCAINE HYDROCHLORIDE AND EPINEPHRINE 10; 10 MG/ML; UG/ML
INJECTION, SOLUTION INFILTRATION; PERINEURAL AS NEEDED
Status: DISCONTINUED | OUTPATIENT
Start: 2018-06-06 | End: 2018-06-06 | Stop reason: HOSPADM

## 2018-06-06 RX ORDER — FENTANYL CITRATE 50 UG/ML
INJECTION, SOLUTION INTRAMUSCULAR; INTRAVENOUS AS NEEDED
Status: DISCONTINUED | OUTPATIENT
Start: 2018-06-06 | End: 2018-06-06 | Stop reason: SURG

## 2018-06-06 RX ORDER — MIDAZOLAM HYDROCHLORIDE 1 MG/ML
1 INJECTION INTRAMUSCULAR; INTRAVENOUS
Status: DISCONTINUED | OUTPATIENT
Start: 2018-06-06 | End: 2018-06-06 | Stop reason: HOSPADM

## 2018-06-06 RX ORDER — LABETALOL HYDROCHLORIDE 5 MG/ML
5 INJECTION, SOLUTION INTRAVENOUS
Status: DISCONTINUED | OUTPATIENT
Start: 2018-06-06 | End: 2018-06-06 | Stop reason: HOSPADM

## 2018-06-06 RX ORDER — FENTANYL CITRATE 50 UG/ML
50 INJECTION, SOLUTION INTRAMUSCULAR; INTRAVENOUS
Status: DISCONTINUED | OUTPATIENT
Start: 2018-06-06 | End: 2018-06-06 | Stop reason: HOSPADM

## 2018-06-06 RX ORDER — LIDOCAINE HYDROCHLORIDE 10 MG/ML
0.5 INJECTION, SOLUTION EPIDURAL; INFILTRATION; INTRACAUDAL; PERINEURAL ONCE AS NEEDED
Status: COMPLETED | OUTPATIENT
Start: 2018-06-06 | End: 2018-06-06

## 2018-06-06 RX ORDER — EPHEDRINE SULFATE 50 MG/ML
5 INJECTION, SOLUTION INTRAVENOUS ONCE AS NEEDED
Status: DISCONTINUED | OUTPATIENT
Start: 2018-06-06 | End: 2018-06-06 | Stop reason: HOSPADM

## 2018-06-06 RX ORDER — ONDANSETRON 2 MG/ML
4 INJECTION INTRAMUSCULAR; INTRAVENOUS ONCE AS NEEDED
Status: COMPLETED | OUTPATIENT
Start: 2018-06-06 | End: 2018-06-06

## 2018-06-06 RX ORDER — PROMETHAZINE HYDROCHLORIDE 25 MG/1
25 SUPPOSITORY RECTAL ONCE AS NEEDED
Status: DISCONTINUED | OUTPATIENT
Start: 2018-06-06 | End: 2018-06-06 | Stop reason: HOSPADM

## 2018-06-06 RX ORDER — GLYCOPYRROLATE 0.2 MG/ML
INJECTION INTRAMUSCULAR; INTRAVENOUS AS NEEDED
Status: DISCONTINUED | OUTPATIENT
Start: 2018-06-06 | End: 2018-06-06 | Stop reason: SURG

## 2018-06-06 RX ORDER — PROMETHAZINE HYDROCHLORIDE 25 MG/1
25 TABLET ORAL ONCE AS NEEDED
Status: DISCONTINUED | OUTPATIENT
Start: 2018-06-06 | End: 2018-06-06 | Stop reason: HOSPADM

## 2018-06-06 RX ORDER — FAMOTIDINE 10 MG/ML
20 INJECTION, SOLUTION INTRAVENOUS ONCE
Status: COMPLETED | OUTPATIENT
Start: 2018-06-06 | End: 2018-06-06

## 2018-06-06 RX ORDER — SODIUM CHLORIDE 9 MG/ML
INJECTION, SOLUTION INTRAVENOUS AS NEEDED
Status: DISCONTINUED | OUTPATIENT
Start: 2018-06-06 | End: 2018-06-06 | Stop reason: HOSPADM

## 2018-06-06 RX ORDER — FLUMAZENIL 0.1 MG/ML
0.2 INJECTION INTRAVENOUS AS NEEDED
Status: DISCONTINUED | OUTPATIENT
Start: 2018-06-06 | End: 2018-06-06 | Stop reason: HOSPADM

## 2018-06-06 RX ORDER — OXYCODONE AND ACETAMINOPHEN 7.5; 325 MG/1; MG/1
1 TABLET ORAL ONCE AS NEEDED
Status: DISCONTINUED | OUTPATIENT
Start: 2018-06-06 | End: 2018-06-06 | Stop reason: HOSPADM

## 2018-06-06 RX ORDER — OXYMETAZOLINE HYDROCHLORIDE 0.05 G/100ML
SPRAY NASAL AS NEEDED
Status: DISCONTINUED | OUTPATIENT
Start: 2018-06-06 | End: 2018-06-06 | Stop reason: HOSPADM

## 2018-06-06 RX ORDER — NALOXONE HCL 0.4 MG/ML
0.2 VIAL (ML) INJECTION AS NEEDED
Status: DISCONTINUED | OUTPATIENT
Start: 2018-06-06 | End: 2018-06-06 | Stop reason: HOSPADM

## 2018-06-06 RX ORDER — EPHEDRINE SULFATE 50 MG/ML
INJECTION, SOLUTION INTRAVENOUS AS NEEDED
Status: DISCONTINUED | OUTPATIENT
Start: 2018-06-06 | End: 2018-06-06 | Stop reason: SURG

## 2018-06-06 RX ORDER — HYDROCODONE BITARTRATE AND ACETAMINOPHEN 7.5; 325 MG/1; MG/1
1 TABLET ORAL ONCE AS NEEDED
Status: COMPLETED | OUTPATIENT
Start: 2018-06-06 | End: 2018-06-06

## 2018-06-06 RX ORDER — OXYMETAZOLINE HYDROCHLORIDE 0.05 G/100ML
3 SPRAY NASAL ONCE
Status: COMPLETED | OUTPATIENT
Start: 2018-06-06 | End: 2018-06-06

## 2018-06-06 RX ORDER — MAGNESIUM HYDROXIDE 1200 MG/15ML
LIQUID ORAL AS NEEDED
Status: DISCONTINUED | OUTPATIENT
Start: 2018-06-06 | End: 2018-06-06 | Stop reason: HOSPADM

## 2018-06-06 RX ORDER — MIDAZOLAM HYDROCHLORIDE 1 MG/ML
2 INJECTION INTRAMUSCULAR; INTRAVENOUS
Status: DISCONTINUED | OUTPATIENT
Start: 2018-06-06 | End: 2018-06-06 | Stop reason: HOSPADM

## 2018-06-06 RX ORDER — PROMETHAZINE HYDROCHLORIDE 25 MG/1
12.5 TABLET ORAL ONCE AS NEEDED
Status: DISCONTINUED | OUTPATIENT
Start: 2018-06-06 | End: 2018-06-06 | Stop reason: HOSPADM

## 2018-06-06 RX ORDER — LIDOCAINE HYDROCHLORIDE 20 MG/ML
INJECTION, SOLUTION INFILTRATION; PERINEURAL AS NEEDED
Status: DISCONTINUED | OUTPATIENT
Start: 2018-06-06 | End: 2018-06-06 | Stop reason: SURG

## 2018-06-06 RX ADMIN — LIDOCAINE HYDROCHLORIDE 60 MG: 20 INJECTION, SOLUTION INFILTRATION; PERINEURAL at 09:55

## 2018-06-06 RX ADMIN — HYDROCODONE BITARTRATE AND ACETAMINOPHEN 1 TABLET: 7.5; 325 TABLET ORAL at 11:23

## 2018-06-06 RX ADMIN — FENTANYL CITRATE 50 MCG: 50 INJECTION INTRAMUSCULAR; INTRAVENOUS at 10:59

## 2018-06-06 RX ADMIN — OXYMETAZOLINE HYDROCHLORIDE 3 SPRAY: 5 SPRAY NASAL at 08:08

## 2018-06-06 RX ADMIN — SODIUM CHLORIDE, POTASSIUM CHLORIDE, SODIUM LACTATE AND CALCIUM CHLORIDE 9 ML/HR: 600; 310; 30; 20 INJECTION, SOLUTION INTRAVENOUS at 08:08

## 2018-06-06 RX ADMIN — ONDANSETRON HYDROCHLORIDE 4 MG: 2 SOLUTION INTRAMUSCULAR; INTRAVENOUS at 10:52

## 2018-06-06 RX ADMIN — PHENYLEPHRINE HYDROCHLORIDE 100 MCG: 10 INJECTION INTRAVENOUS at 10:16

## 2018-06-06 RX ADMIN — FAMOTIDINE 20 MG: 10 INJECTION INTRAVENOUS at 08:08

## 2018-06-06 RX ADMIN — EPHEDRINE SULFATE 5 MG: 50 INJECTION INTRAMUSCULAR; INTRAVENOUS; SUBCUTANEOUS at 10:21

## 2018-06-06 RX ADMIN — ROCURONIUM BROMIDE 30 MG: 10 INJECTION INTRAVENOUS at 09:55

## 2018-06-06 RX ADMIN — FENTANYL CITRATE 50 MCG: 50 INJECTION INTRAMUSCULAR; INTRAVENOUS at 11:25

## 2018-06-06 RX ADMIN — PHENYLEPHRINE HYDROCHLORIDE 100 MCG: 10 INJECTION INTRAVENOUS at 10:21

## 2018-06-06 RX ADMIN — NEOSTIGMINE METHYLSULFATE 4 MG: 1 INJECTION INTRAMUSCULAR; INTRAVENOUS; SUBCUTANEOUS at 10:54

## 2018-06-06 RX ADMIN — EPHEDRINE SULFATE 10 MG: 50 INJECTION INTRAMUSCULAR; INTRAVENOUS; SUBCUTANEOUS at 10:12

## 2018-06-06 RX ADMIN — MIDAZOLAM 2 MG: 1 INJECTION INTRAMUSCULAR; INTRAVENOUS at 08:08

## 2018-06-06 RX ADMIN — PHENYLEPHRINE HYDROCHLORIDE 100 MCG: 10 INJECTION INTRAVENOUS at 10:27

## 2018-06-06 RX ADMIN — ONDANSETRON HYDROCHLORIDE 4 MG: 2 SOLUTION INTRAMUSCULAR; INTRAVENOUS at 10:54

## 2018-06-06 RX ADMIN — PROPOFOL 180 MG: 10 INJECTION, EMULSION INTRAVENOUS at 09:55

## 2018-06-06 RX ADMIN — EPHEDRINE SULFATE 10 MG: 50 INJECTION INTRAMUSCULAR; INTRAVENOUS; SUBCUTANEOUS at 10:03

## 2018-06-06 RX ADMIN — LIDOCAINE HYDROCHLORIDE 0.5 ML: 10 INJECTION, SOLUTION EPIDURAL; INFILTRATION; INTRACAUDAL; PERINEURAL at 08:08

## 2018-06-06 RX ADMIN — FENTANYL CITRATE 50 MCG: 50 INJECTION INTRAMUSCULAR; INTRAVENOUS at 09:54

## 2018-06-06 RX ADMIN — FENTANYL CITRATE 50 MCG: 50 INJECTION INTRAMUSCULAR; INTRAVENOUS at 11:37

## 2018-06-06 RX ADMIN — GLYCOPYRROLATE 0.6 MG: 0.2 INJECTION INTRAMUSCULAR; INTRAVENOUS at 10:54

## 2018-06-06 RX ADMIN — PHENYLEPHRINE HYDROCHLORIDE 100 MCG: 10 INJECTION INTRAVENOUS at 10:32

## 2018-06-06 NOTE — PERIOPERATIVE NURSING NOTE
Pt with history of sleep apnea and cpap use - sats on room air 88-94%, pt will not be able to use cpap due to surgery.  States pain is 7/10, cold applied for additional pain relief, no additional iv narcotic given due to low oxygen saturation.

## 2018-06-06 NOTE — ANESTHESIA PREPROCEDURE EVALUATION
Anesthesia Evaluation     Patient summary reviewed and Nursing notes reviewed   NPO Solid Status: > 8 hours  NPO Liquid Status: > 8 hours           Airway   Mallampati: III  TM distance: >3 FB  Neck ROM: full  no difficulty expected  Dental - normal exam     Pulmonary - normal exam   (+) asthma, sleep apnea on CPAP,   Cardiovascular - normal exam  Exercise tolerance: good (4-7 METS)    ECG reviewed  Patient on routine beta blocker and Beta blocker given within 24 hours of surgery  Rhythm: regular  Rate: normal    (+) hypertension, CAD, hyperlipidemia,       Neuro/Psych  (+) numbness, psychiatric history Depression,     GI/Hepatic/Renal/Endo    (+) morbid obesity,  diabetes mellitus type 2,     Musculoskeletal     Abdominal  - normal exam   Substance History - negative use     OB/GYN          Other   (+) arthritis                     Anesthesia Plan    ASA 3     general     intravenous induction   Anesthetic plan and risks discussed with patient.

## 2018-06-06 NOTE — ANESTHESIA PROCEDURE NOTES
Airway  Urgency: elective    Date/Time: 6/6/2018 9:59 AM  Airway not difficult    General Information and Staff    Patient location during procedure: OR  Anesthesiologist: RENDER, JYOTHI RAY  CRNA: ANGEL SALINAS    Indications and Patient Condition  Indications for airway management: airway protection    Preoxygenated: yes  Mask difficulty assessment: 1 - vent by mask    Final Airway Details  Final airway type: endotracheal airway      Successful airway: ETT  Cuffed: yes   Successful intubation technique: direct laryngoscopy  Endotracheal tube insertion site: oral  Blade: Breanna  Blade size: #4  ETT size: 7.5 mm  Cormack-Lehane Classification: grade I - full view of glottis  Placement verified by: chest auscultation and capnometry   Number of attempts at approach: 1    Additional Comments  Pre 02, sivi,, easy bvm, dlx1, dvvc, intubation x 1 attempt, #7.5 oral chanel tube, +etc02, +bebs, appears atraumatic, teeth unchanged

## 2018-06-06 NOTE — OP NOTE
Preoperative diagnosis: Chronic nasal obstruction    Postoperative diagnosis: Same    Operative procedures:  #1 nasal septoplasty  #2 bilateral submucous inferior turbinectomy    Surgeon: Jay    Anesthesia: Gen.    Operative findings: Following the induction of general anesthesia, utilizing oral endotracheal intubation, the patient's eyelids were taped shut.  The head was supported on a foam cushion and the patient was draped appropriately.    The intranasal examination revealed marked deflection of the nasal septum to the left side, and hypertrophy of the inferior nasal turbinates.  Both sides of the nasal septum were injected with 1% Xylocaine with epinephrine 1-100,000; then neurosurgical cottonoids dampened in oxymetazoline nasal spray were placed between the inferior nasal turbinates and nasal septum to allow further vasoconstriction.    Allowing time for vasoconstriction to occur a left-sided [place] incision was made.  There appeared to be fibrosis between the nasal septal flap and the underlying cartilage which was remarkably thickened.  A left sided submucoperichondrial and submucoperiosteal flap was then elevated.  3 or 4 mm posterior to the incision a vertical incision was made in a thickened quadrilateral cartilage allowing elevation of a contralateral flap.  The posterior caudal cartilage and the bony cartilaginous junction were then resected using a Emily forceps.  A tear of the anterior right nasal septal flap occurred but the contralateral left-sided flap appeared intact.    Next using a 0° sinus endoscope and microdebrider, a bilateral submucous inferior turbinate resection was performed, collecting the specimen within a trap within the micro-debrider suction canister.    At the completion of the procedure the Jeramy incision was closed with interrupted 4-0 chromic.  Each side of nose was packed with a 8.0 Merisel nasal tampon sutured with a gloved finger tip and lubricated with  bacitracin ointment.  The packs allowed to expand with saline in the drawstrings then taped to the patient's cheeks.  A drip pad was applied and the patient was allowed to waken and was transferred to recovery room in satisfactory condition.

## 2018-06-06 NOTE — ANESTHESIA POSTPROCEDURE EVALUATION
Patient: Bam Negron    Procedure Summary     Date:  06/06/18 Room / Location:   [facility] [address] [place] [address] [facility]    Anesthesia Start:  0953 Anesthesia Stop:  1110    Procedure:  NASAL SEPTOPLASTY BILATERAL INFERIOR TURBINECTOMY (Bilateral Nose) Diagnosis:      Surgeon:  Adiel Thompson MD Provider:  Keron Kahn MD    Anesthesia Type:  general ASA Status:  3          Anesthesia Type: general  Last vitals  BP   118/71 (06/06/18 1200)   Temp   36.7 °C (98 °F) (06/06/18 1212)   Pulse   69 (06/06/18 1200)   Resp   16 (06/06/18 1200)     SpO2   94 % (06/06/18 1212)     Post Anesthesia Care and Evaluation    Patient location during evaluation: PACU  Patient participation: complete - patient participated  Level of consciousness: awake and alert  Pain management: adequate  Airway patency: patent  Anesthetic complications: No anesthetic complications    Cardiovascular status: acceptable  Respiratory status: acceptable  Hydration status: acceptable

## 2018-06-07 LAB
CYTO UR: NORMAL
LAB AP CASE REPORT: NORMAL
Lab: NORMAL
PATH REPORT.FINAL DX SPEC: NORMAL
PATH REPORT.GROSS SPEC: NORMAL

## 2018-06-28 ENCOUNTER — RESULTS ENCOUNTER (OUTPATIENT)
Dept: FAMILY MEDICINE CLINIC | Facility: CLINIC | Age: 58
End: 2018-06-28

## 2018-06-28 DIAGNOSIS — E78.2 MIXED HYPERLIPIDEMIA: ICD-10-CM

## 2018-06-28 DIAGNOSIS — E11.9 TYPE 2 DIABETES MELLITUS WITHOUT COMPLICATION, WITHOUT LONG-TERM CURRENT USE OF INSULIN (HCC): ICD-10-CM

## 2018-06-28 DIAGNOSIS — I10 ESSENTIAL HYPERTENSION: ICD-10-CM

## 2018-06-28 DIAGNOSIS — Z12.5 SCREENING FOR PROSTATE CANCER: ICD-10-CM

## 2018-06-28 DIAGNOSIS — M19.90 ARTHRITIS: ICD-10-CM

## 2018-06-28 DIAGNOSIS — Z51.81 MEDICATION MONITORING ENCOUNTER: ICD-10-CM

## 2018-07-03 LAB
ALT SERPL-CCNC: 23 U/L (ref 5–41)
ANA SER QL: NEGATIVE
BUN SERPL-MCNC: 17 MG/DL (ref 6–20)
BUN/CREAT SERPL: 25.4 (ref 7–25)
CALCIUM SERPL-MCNC: 9.2 MG/DL (ref 8.6–10.5)
CHLORIDE SERPL-SCNC: 101 MMOL/L (ref 98–107)
CHOLEST SERPL-MCNC: 137 MG/DL (ref 0–200)
CK SERPL-CCNC: 76 U/L (ref 36–170)
CO2 SERPL-SCNC: 27.8 MMOL/L (ref 22–29)
CREAT SERPL-MCNC: 0.67 MG/DL (ref 0.76–1.27)
ERYTHROCYTE [DISTWIDTH] IN BLOOD BY AUTOMATED COUNT: 12.8 % (ref 11.5–14.5)
ERYTHROCYTE [SEDIMENTATION RATE] IN BLOOD BY WESTERGREN METHOD: 14 MM/HR (ref 0–20)
GLUCOSE SERPL-MCNC: 195 MG/DL (ref 65–99)
HBA1C MFR BLD: 7.8 % (ref 4.8–5.6)
HCT VFR BLD AUTO: 39.9 % (ref 42–52)
HDLC SERPL-MCNC: 40 MG/DL (ref 40–60)
HGB BLD-MCNC: 13.7 G/DL (ref 14–18)
LDLC SERPL CALC-MCNC: 60 MG/DL (ref 0–100)
MCH RBC QN AUTO: 31 PG (ref 27–31)
MCHC RBC AUTO-ENTMCNC: 34.3 G/DL (ref 31–37)
MCV RBC AUTO: 90.3 FL (ref 80–94)
MICROALBUMIN UR-MCNC: 6.4 UG/ML
PLATELET # BLD AUTO: 320 10*3/MM3 (ref 140–500)
POTASSIUM SERPL-SCNC: 4.3 MMOL/L (ref 3.5–5.2)
PSA SERPL-MCNC: 0.28 NG/ML (ref 0–4)
RBC # BLD AUTO: 4.42 10*6/MM3 (ref 4.7–6.1)
RHEUMATOID FACT SERPL-ACNC: <10 IU/ML (ref 0–13.9)
SODIUM SERPL-SCNC: 141 MMOL/L (ref 136–145)
TRIGL SERPL-MCNC: 186 MG/DL (ref 0–150)
TSH SERPL DL<=0.005 MIU/L-ACNC: 1.25 MIU/ML (ref 0.27–4.2)
VLDLC SERPL CALC-MCNC: 37.2 MG/DL (ref 8–32)
WBC # BLD AUTO: 7.32 10*3/MM3 (ref 4.8–10.8)

## 2018-07-09 ENCOUNTER — OFFICE VISIT (OUTPATIENT)
Dept: FAMILY MEDICINE CLINIC | Facility: CLINIC | Age: 58
End: 2018-07-09

## 2018-07-09 VITALS
HEIGHT: 68 IN | WEIGHT: 253 LBS | BODY MASS INDEX: 38.34 KG/M2 | HEART RATE: 92 BPM | SYSTOLIC BLOOD PRESSURE: 120 MMHG | DIASTOLIC BLOOD PRESSURE: 68 MMHG | OXYGEN SATURATION: 96 %

## 2018-07-09 DIAGNOSIS — I10 ESSENTIAL HYPERTENSION: Primary | ICD-10-CM

## 2018-07-09 DIAGNOSIS — E11.9 TYPE 2 DIABETES MELLITUS WITHOUT COMPLICATION, WITHOUT LONG-TERM CURRENT USE OF INSULIN (HCC): ICD-10-CM

## 2018-07-09 DIAGNOSIS — IMO0001 UNCONTROLLED TYPE 2 DIABETES MELLITUS WITHOUT COMPLICATION, WITHOUT LONG-TERM CURRENT USE OF INSULIN: ICD-10-CM

## 2018-07-09 DIAGNOSIS — Z51.81 MEDICATION MONITORING ENCOUNTER: ICD-10-CM

## 2018-07-09 PROCEDURE — 99214 OFFICE O/P EST MOD 30 MIN: CPT | Performed by: FAMILY MEDICINE

## 2018-07-09 NOTE — PROGRESS NOTES
"  Chief Complaint   Patient presents with   • Follow-up   • Hypertension   • Hyperlipidemia   • Diabetes       Subjective   Bam Negron is an 57 y.o. male who presents for follow up of diabetes. Current symptoms include: none. Patient denies foot ulcerations, hypoglycemia , increased appetite, nausea, paresthesia of the feet, polydipsia, polyuria, visual disturbances, vomiting and weight loss. Evaluation to date has included: fasting blood sugar, fasting lipid panel, hemoglobin A1C and microalbuminuria. Home sugars: patient does not check sugars. Current treatments: more intensive attention to diet which has been somewhat effective, low cholesterol diet which has been somewhat effective, increased aerobic exercise which has been not very effective, Continued sulfonylurea which has been somewhat effective, Continued metformin which has been somewhat effective, Continued statin which has been effective, Continued ACE inhibitor/ARB which has been effective and Continued Victoza which has been somewhat effective. Discussed importance of yearly eye exams and checking feet for skin integrity.      The following portions of the patient's history were reviewed and updated as appropriate: allergies, current medications, past family history, past medical history, past social history, past surgical history and problem list.        Review of Systems  Pertinent items are noted in HPI.     Vitals:    07/09/18 0924   BP: 120/68   BP Location: Left arm   Patient Position: Sitting   Pulse: 92   SpO2: 96%   Weight: 115 kg (253 lb)   Height: 172.7 cm (68\")       Objective    Gen:  Alert, pleasant  Ears: canals clear, TMs normal  Throat: clear , no thrush, teeth ok  Neck: no bruit, no LAD  Lungs: clear  Heart: RR no murmur  Feet:  No rash, no skin breakdown, sensation grossly normal.    Laboratory:  Results for orders placed or performed in visit on 06/28/18   Lipid Panel   Result Value Ref Range    Total Cholesterol 137 0 - 200 " mg/dL    Triglycerides 186 (H) 0 - 150 mg/dL    HDL Cholesterol 40 40 - 60 mg/dL    VLDL Cholesterol 37.2 (H) 8 - 32 mg/dL    LDL Cholesterol  60 0 - 100 mg/dL   MicroAlbumin, Urine, Random - Urine, Clean Catch   Result Value Ref Range    Microalbumin, Urine 6.4 Not Estab. ug/mL   ALT   Result Value Ref Range    ALT (SGPT) 23 5 - 41 U/L   Basic Metabolic Panel   Result Value Ref Range    Glucose 195 (H) 65 - 99 mg/dL    BUN 17 6 - 20 mg/dL    Creatinine 0.67 (L) 0.76 - 1.27 mg/dL    eGFR Non African Am 122 >60 mL/min/1.73    eGFR African Am 148 >60 mL/min/1.73    BUN/Creatinine Ratio 25.4 (H) 7.0 - 25.0    Sodium 141 136 - 145 mmol/L    Potassium 4.3 3.5 - 5.2 mmol/L    Chloride 101 98 - 107 mmol/L    Total CO2 27.8 22.0 - 29.0 mmol/L    Calcium 9.2 8.6 - 10.5 mg/dL   RUBEN With / DsDNA, RNP, Sjogrens A / B, [place]   Result Value Ref Range    RUBEN Direct Negative Negative   CBC (No Diff)   Result Value Ref Range    WBC 7.32 4.80 - 10.80 10*3/mm3    RBC 4.42 (L) 4.70 - 6.10 10*6/mm3    Hemoglobin 13.7 (L) 14.0 - 18.0 g/dL    Hematocrit 39.9 (L) 42.0 - 52.0 %    MCV 90.3 80.0 - 94.0 fL    MCH 31.0 27.0 - 31.0 pg    MCHC 34.3 31.0 - 37.0 g/dL    RDW 12.8 11.5 - 14.5 %    Platelets 320 140 - 500 10*3/mm3   PSA Screen   Result Value Ref Range    PSA 0.284 0.000 - 4.000 ng/mL   Sedimentation Rate   Result Value Ref Range    Sed Rate 14 0 - 20 mm/hr   Rheumatoid Factor, Quant   Result Value Ref Range    RA Latex Turbid <10.0 0.0 - 13.9 IU/mL   TSH   Result Value Ref Range    TSH 1.250 0.270 - 4.200 mIU/mL   Hemoglobin A1c   Result Value Ref Range    Hemoglobin A1C 7.80 (H) 4.80 - 5.60 %   CK   Result Value Ref Range    Creatine Kinase 76 36 - 170 U/L        Assessment/Plan        Discussed general issues about diabetes pathophysiology and management.  Continued sulfonylurea; see medication orders.   Continued metformin; see  medication orders.  Continued statin drug see medication orders.  Continued ACE inhibitor; see  medication orders.  Follow up in 5 months or as needed.    Bam was seen today for follow-up, hypertension, hyperlipidemia and diabetes.    Diagnoses and all orders for this visit:    Essential hypertension    Medication monitoring encounter  -     ALT; Future    Type 2 diabetes mellitus without complication, without long-term current use of insulin (CMS/[facility])  -     Lipid Panel; Future  -     MicroAlbumin, Urine, Random - Urine, Clean Catch; Future  -     Basic Metabolic Panel; Future  -     Hemoglobin A1c; Future    Uncontrolled type 2 diabetes mellitus without complication, without long-term current use of insulin (CMS/[facility])      A1c is not to goal  Is s/p sinus surgery.  We are on 3 max dose diabetes meds  Asked him to restart his exercise program and continue to find ways to eliminate calories from his diet.    All labs for sero + arthritis are negative.      Return in about 4 months (around 11/9/2018) for blood pressure, diabetes.  There are no Patient Instructions on file for this visit.  Medications Discontinued During This Encounter   Medication Reason   • Multiple Vitamins-Minerals (MULTIVITAMIN ADULT PO) Duplicate order         Dr. Lalito Hansen MD  [place], [place]  [facility].

## 2018-11-19 ENCOUNTER — TELEPHONE (OUTPATIENT)
Dept: FAMILY MEDICINE CLINIC | Facility: CLINIC | Age: 58
End: 2018-11-19

## 2018-11-19 RX ORDER — AMOXICILLIN 500 MG/1
500 TABLET, FILM COATED ORAL 3 TIMES DAILY
Qty: 21 TABLET | Refills: 0 | Status: SHIPPED | OUTPATIENT
Start: 2018-11-19 | End: 2018-11-26

## 2018-11-19 NOTE — TELEPHONE ENCOUNTER
Pt was unable to get in today. Would like you to call him in something for Bronchitis.      Ok amoxil sent in    Lalito Hansen MD

## 2018-11-30 DIAGNOSIS — E11.9 TYPE 2 DIABETES MELLITUS WITHOUT COMPLICATION, WITHOUT LONG-TERM CURRENT USE OF INSULIN (HCC): ICD-10-CM

## 2018-11-30 DIAGNOSIS — Z51.81 MEDICATION MONITORING ENCOUNTER: ICD-10-CM

## 2018-12-04 LAB
ALT SERPL-CCNC: 26 U/L (ref 5–41)
BUN SERPL-MCNC: 14 MG/DL (ref 6–20)
BUN/CREAT SERPL: 19.7 (ref 7–25)
CALCIUM SERPL-MCNC: 9.4 MG/DL (ref 8.6–10.5)
CHLORIDE SERPL-SCNC: 101 MMOL/L (ref 98–107)
CHOLEST SERPL-MCNC: 133 MG/DL (ref 0–200)
CO2 SERPL-SCNC: 29.4 MMOL/L (ref 22–29)
CREAT SERPL-MCNC: 0.71 MG/DL (ref 0.76–1.27)
GLUCOSE SERPL-MCNC: 79 MG/DL (ref 65–99)
HBA1C MFR BLD: 7.5 % (ref 4.8–5.6)
HDLC SERPL-MCNC: 45 MG/DL (ref 40–60)
LDLC SERPL CALC-MCNC: 57 MG/DL (ref 0–100)
MICROALBUMIN UR-MCNC: 5 UG/ML
POTASSIUM SERPL-SCNC: 4.4 MMOL/L (ref 3.5–5.2)
SODIUM SERPL-SCNC: 142 MMOL/L (ref 136–145)
TRIGL SERPL-MCNC: 155 MG/DL (ref 0–150)
VLDLC SERPL CALC-MCNC: 31 MG/DL (ref 8–32)

## 2018-12-17 ENCOUNTER — OFFICE VISIT (OUTPATIENT)
Dept: FAMILY MEDICINE CLINIC | Facility: CLINIC | Age: 58
End: 2018-12-17

## 2018-12-17 VITALS
BODY MASS INDEX: 38.49 KG/M2 | DIASTOLIC BLOOD PRESSURE: 80 MMHG | OXYGEN SATURATION: 93 % | SYSTOLIC BLOOD PRESSURE: 134 MMHG | HEART RATE: 96 BPM | WEIGHT: 254 LBS | HEIGHT: 68 IN

## 2018-12-17 DIAGNOSIS — J41.0 SIMPLE CHRONIC BRONCHITIS (HCC): ICD-10-CM

## 2018-12-17 DIAGNOSIS — E78.2 MIXED HYPERLIPIDEMIA: ICD-10-CM

## 2018-12-17 DIAGNOSIS — M51.26 LUMBAR HERNIATED DISC: ICD-10-CM

## 2018-12-17 DIAGNOSIS — Z51.81 MEDICATION MONITORING ENCOUNTER: ICD-10-CM

## 2018-12-17 DIAGNOSIS — E11.9 TYPE 2 DIABETES MELLITUS WITHOUT COMPLICATION, WITHOUT LONG-TERM CURRENT USE OF INSULIN (HCC): ICD-10-CM

## 2018-12-17 DIAGNOSIS — I10 ESSENTIAL HYPERTENSION: Primary | ICD-10-CM

## 2018-12-17 DIAGNOSIS — IMO0001 UNCONTROLLED DIABETES MELLITUS TYPE 2 WITHOUT COMPLICATIONS: ICD-10-CM

## 2018-12-17 PROCEDURE — 99214 OFFICE O/P EST MOD 30 MIN: CPT | Performed by: FAMILY MEDICINE

## 2018-12-17 RX ORDER — DULOXETIN HYDROCHLORIDE 60 MG/1
60 CAPSULE, DELAYED RELEASE ORAL DAILY
Qty: 90 CAPSULE | Refills: 3 | Status: SHIPPED | OUTPATIENT
Start: 2018-12-17 | End: 2019-12-15 | Stop reason: SDUPTHER

## 2018-12-17 RX ORDER — AMLODIPINE BESYLATE AND BENAZEPRIL HYDROCHLORIDE 10; 40 MG/1; MG/1
1 CAPSULE ORAL DAILY
Qty: 90 CAPSULE | Refills: 3 | Status: SHIPPED | OUTPATIENT
Start: 2018-12-17 | End: 2020-01-06

## 2018-12-17 RX ORDER — ATORVASTATIN CALCIUM 80 MG/1
80 TABLET, FILM COATED ORAL DAILY
Qty: 90 TABLET | Refills: 3 | Status: SHIPPED | OUTPATIENT
Start: 2018-12-17 | End: 2020-03-09

## 2018-12-17 RX ORDER — CARVEDILOL 25 MG/1
25 TABLET ORAL 2 TIMES DAILY WITH MEALS
Qty: 180 TABLET | Refills: 3 | Status: SHIPPED | OUTPATIENT
Start: 2018-12-17 | End: 2020-01-27

## 2018-12-17 RX ORDER — ALBUTEROL SULFATE 90 UG/1
2 AEROSOL, METERED RESPIRATORY (INHALATION) EVERY 4 HOURS PRN
Qty: 1 INHALER | Refills: 5 | Status: SHIPPED | OUTPATIENT
Start: 2018-12-17 | End: 2019-10-28 | Stop reason: SDUPTHER

## 2018-12-17 NOTE — PROGRESS NOTES
"  Chief Complaint   Patient presents with   • Follow-up   • Diabetes   • Hypertension       Subjective   Bam Negron is an 57 y.o. male who presents for follow up of diabetes. Current symptoms include: hyperglycemia. Patient denies foot ulcerations. Evaluation to date has included: fasting blood sugar, fasting lipid panel, hemoglobin A1C and microalbuminuria. Home sugars: patient does not check sugars. Current treatments: more intensive attention to diet which has been not very effective, low cholesterol diet which has been somewhat effective, increased aerobic exercise which has been not very effective, Continued sulfonylurea which has been not very effective, Continued metformin which has been somewhat effective, Continued statin which has been effective, Continued ACE inhibitor/ARB which has been effective and Continued Victoza which has been somewhat effective. Discussed importance of yearly eye exams and checking feet for skin integrity.      The following portions of the patient's history were reviewed and updated as appropriate: allergies, current medications, past family history, past medical history, past social history, past surgical history and problem list.        Review of Systems  Pertinent items are noted in HPI.     Vitals:    12/17/18 1445   BP: 134/80   BP Location: Left arm   Patient Position: Sitting   Pulse: 96   SpO2: 93%   Weight: 115 kg (254 lb)   Height: 172.7 cm (68\")       Objective    Gen:  Alert, pleasant  Ears: canals clear, TMs normal  Throat: clear , no thrush, teeth ok  Neck: no bruit, no LAD  Lungs: clear  Heart: RR no murmur  Feet:  No rash, no skin breakdown, sensation grossly normal.    Laboratory:  Results for orders placed or performed in visit on 11/30/18   Hemoglobin A1c   Result Value Ref Range    Hemoglobin A1C 7.50 (H) 4.80 - 5.60 %   Basic Metabolic Panel   Result Value Ref Range    Glucose 79 65 - 99 mg/dL    BUN 14 6 - 20 mg/dL    Creatinine 0.71 (L) 0.76 - 1.27 " mg/dL    eGFR Non African Am 114 >60 mL/min/1.73    eGFR African Am 139 >60 mL/min/1.73    BUN/Creatinine Ratio 19.7 7.0 - 25.0    Sodium 142 136 - 145 mmol/L    Potassium 4.4 3.5 - 5.2 mmol/L    Chloride 101 98 - 107 mmol/L    Total CO2 29.4 (H) 22.0 - 29.0 mmol/L    Calcium 9.4 8.6 - 10.5 mg/dL   ALT   Result Value Ref Range    ALT (SGPT) 26 5 - 41 U/L   MicroAlbumin, Urine, Random - Urine, Clean Catch   Result Value Ref Range    Microalbumin, Urine 5.0 Not Estab. ug/mL   Lipid Panel   Result Value Ref Range    Total Cholesterol 133 0 - 200 mg/dL    Triglycerides 155 (H) 0 - 150 mg/dL    HDL Cholesterol 45 40 - 60 mg/dL    VLDL Cholesterol 31 8 - 32 mg/dL    LDL Cholesterol  57 0 - 100 mg/dL        Assessment/Plan        Discussed general issues about diabetes pathophysiology and management.  Discontinued sulfonylurea; see medication orders.   Continued metformin; see  medication orders.  Continued statin drug see medication orders.  Continued ACE inhibitor; see medication orders.  Follow up in 3 months or as needed.    Bam was seen today for follow-up, diabetes and hypertension.    Diagnoses and all orders for this visit:    Essential hypertension  -     amLODIPine-benazepril (LOTREL) 10-40 MG per capsule; Take 1 capsule by mouth Daily.  -     carvedilol (COREG) 25 MG tablet; Take 1 tablet by mouth 2 (Two) Times a Day With Meals.    Medication monitoring encounter    Mixed hyperlipidemia  -     atorvastatin (LIPITOR) 80 MG tablet; Take 1 tablet by mouth Daily.    Uncontrolled diabetes mellitus type 2 without complications (CMS/HCC)  -     Dapagliflozin Propanediol (FARXIGA) 10 MG tablet; Take 10 mg by mouth Daily.  -     MicroAlbumin, Urine, Random - Urine, Clean Catch; Future  -     Basic Metabolic Panel; Future  -     Hemoglobin A1c; Future    Simple chronic bronchitis (CMS/HCC)  -     albuterol 108 (90 Base) MCG/ACT inhaler; Inhale 2 puffs Every 4 (Four) Hours As Needed for Wheezing.    Lumbar herniated  disc  -     DULoxetine (CYMBALTA) 60 MG capsule; Take 1 capsule by mouth Daily.    Type 2 diabetes mellitus without complication, without long-term current use of insulin (CMS/[facility])  -     metFORMIN (GLUCOPHAGE) 1000 MG tablet; Take 1 tablet by mouth 2 (Two) Times a Day With Meals.      Will stop glipizide today  Start Farxiga    Return in about 3 months (around 3/17/2019) for diabetes, new medication follow up.  There are no Patient Instructions on file for this visit.  Medications Discontinued During This Encounter   Medication Reason   • glipiZIDE (GLUCOTROL) 10 MG tablet Not Efficacious   • atorvastatin (LIPITOR) 80 MG tablet Reorder   • amLODIPine-benazepril (LOTREL) 10-40 MG per capsule Reorder   • albuterol (PROVENTIL HFA;VENTOLIN HFA) 108 (90 BASE) MCG/ACT inhaler Reorder   • carvedilol (COREG) 25 MG tablet Reorder   • DULoxetine (CYMBALTA) 60 MG capsule Reorder   • metFORMIN (GLUCOPHAGE) 1000 MG tablet Reorder         Dr. Lalito Hansen MD  [place], [place]  [facility].

## 2019-02-07 DIAGNOSIS — E11.9 TYPE 2 DIABETES MELLITUS WITHOUT COMPLICATION, WITHOUT LONG-TERM CURRENT USE OF INSULIN (HCC): ICD-10-CM

## 2019-02-08 RX ORDER — LIRAGLUTIDE 6 MG/ML
INJECTION SUBCUTANEOUS
Qty: 5 PEN | Refills: 0 | Status: SHIPPED | OUTPATIENT
Start: 2019-02-08 | End: 2019-03-18 | Stop reason: SDUPTHER

## 2019-03-11 ENCOUNTER — RESULTS ENCOUNTER (OUTPATIENT)
Dept: FAMILY MEDICINE CLINIC | Facility: CLINIC | Age: 59
End: 2019-03-11

## 2019-03-11 DIAGNOSIS — IMO0001 UNCONTROLLED DIABETES MELLITUS TYPE 2 WITHOUT COMPLICATIONS: ICD-10-CM

## 2019-03-12 LAB
BUN SERPL-MCNC: 16 MG/DL (ref 6–20)
BUN/CREAT SERPL: 19.3 (ref 7–25)
CALCIUM SERPL-MCNC: 9.8 MG/DL (ref 8.6–10.5)
CHLORIDE SERPL-SCNC: 102 MMOL/L (ref 98–107)
CO2 SERPL-SCNC: 27.8 MMOL/L (ref 22–29)
CREAT SERPL-MCNC: 0.83 MG/DL (ref 0.76–1.27)
GLUCOSE SERPL-MCNC: 122 MG/DL (ref 65–99)
HBA1C MFR BLD: 7 % (ref 4.8–5.6)
MICROALBUMIN UR-MCNC: 12.8 UG/ML
POTASSIUM SERPL-SCNC: 4.2 MMOL/L (ref 3.5–5.2)
SODIUM SERPL-SCNC: 143 MMOL/L (ref 136–145)

## 2019-03-18 ENCOUNTER — OFFICE VISIT (OUTPATIENT)
Dept: FAMILY MEDICINE CLINIC | Facility: CLINIC | Age: 59
End: 2019-03-18

## 2019-03-18 VITALS
OXYGEN SATURATION: 93 % | HEIGHT: 68 IN | DIASTOLIC BLOOD PRESSURE: 82 MMHG | WEIGHT: 246 LBS | BODY MASS INDEX: 37.28 KG/M2 | SYSTOLIC BLOOD PRESSURE: 120 MMHG | HEART RATE: 90 BPM

## 2019-03-18 DIAGNOSIS — E11.9 TYPE 2 DIABETES MELLITUS WITHOUT COMPLICATION, WITHOUT LONG-TERM CURRENT USE OF INSULIN (HCC): ICD-10-CM

## 2019-03-18 DIAGNOSIS — N48.1 BALANITIS: ICD-10-CM

## 2019-03-18 DIAGNOSIS — IMO0001 UNCONTROLLED DIABETES MELLITUS TYPE 2 WITHOUT COMPLICATIONS: ICD-10-CM

## 2019-03-18 DIAGNOSIS — R30.0 DYSURIA: Primary | ICD-10-CM

## 2019-03-18 LAB
BILIRUB BLD-MCNC: NEGATIVE MG/DL
CLARITY, POC: CLEAR
COLOR UR: YELLOW
GLUCOSE UR STRIP-MCNC: ABNORMAL MG/DL
KETONES UR QL: ABNORMAL
LEUKOCYTE EST, POC: NEGATIVE
NITRITE UR-MCNC: NEGATIVE MG/ML
PH UR: 6 [PH] (ref 5–8)
PROT UR STRIP-MCNC: ABNORMAL MG/DL
RBC # UR STRIP: ABNORMAL /UL
SP GR UR: 1.01 (ref 1–1.03)
UROBILINOGEN UR QL: NORMAL

## 2019-03-18 PROCEDURE — 99213 OFFICE O/P EST LOW 20 MIN: CPT | Performed by: FAMILY MEDICINE

## 2019-03-18 PROCEDURE — 81002 URINALYSIS NONAUTO W/O SCOPE: CPT | Performed by: FAMILY MEDICINE

## 2019-06-24 ENCOUNTER — RESULTS ENCOUNTER (OUTPATIENT)
Dept: FAMILY MEDICINE CLINIC | Facility: CLINIC | Age: 59
End: 2019-06-24

## 2019-06-24 DIAGNOSIS — E11.9 TYPE 2 DIABETES MELLITUS WITHOUT COMPLICATION, WITHOUT LONG-TERM CURRENT USE OF INSULIN (HCC): ICD-10-CM

## 2019-06-25 LAB
BUN SERPL-MCNC: 14 MG/DL (ref 6–20)
BUN/CREAT SERPL: 17.9 (ref 7–25)
CALCIUM SERPL-MCNC: 9.4 MG/DL (ref 8.6–10.5)
CHLORIDE SERPL-SCNC: 101 MMOL/L (ref 98–107)
CO2 SERPL-SCNC: 28.5 MMOL/L (ref 22–29)
CREAT SERPL-MCNC: 0.78 MG/DL (ref 0.76–1.27)
GLUCOSE SERPL-MCNC: 192 MG/DL (ref 65–99)
HBA1C MFR BLD: 7.8 % (ref 4.8–5.6)
MICROALBUMIN UR-MCNC: 3.7 UG/ML
POTASSIUM SERPL-SCNC: 3.9 MMOL/L (ref 3.5–5.2)
SODIUM SERPL-SCNC: 143 MMOL/L (ref 136–145)

## 2019-07-01 ENCOUNTER — OFFICE VISIT (OUTPATIENT)
Dept: FAMILY MEDICINE CLINIC | Facility: CLINIC | Age: 59
End: 2019-07-01

## 2019-07-01 VITALS
DIASTOLIC BLOOD PRESSURE: 90 MMHG | HEART RATE: 81 BPM | WEIGHT: 247 LBS | BODY MASS INDEX: 37.44 KG/M2 | OXYGEN SATURATION: 96 % | HEIGHT: 68 IN | SYSTOLIC BLOOD PRESSURE: 154 MMHG

## 2019-07-01 DIAGNOSIS — Z51.81 MEDICATION MONITORING ENCOUNTER: ICD-10-CM

## 2019-07-01 DIAGNOSIS — I10 ESSENTIAL HYPERTENSION: Primary | ICD-10-CM

## 2019-07-01 DIAGNOSIS — E11.9 TYPE 2 DIABETES MELLITUS WITHOUT COMPLICATION, WITHOUT LONG-TERM CURRENT USE OF INSULIN (HCC): ICD-10-CM

## 2019-07-01 DIAGNOSIS — E78.2 MIXED HYPERLIPIDEMIA: ICD-10-CM

## 2019-07-01 PROBLEM — N48.1 BALANITIS: Status: RESOLVED | Noted: 2019-03-18 | Resolved: 2019-07-01

## 2019-07-01 PROCEDURE — 99213 OFFICE O/P EST LOW 20 MIN: CPT | Performed by: FAMILY MEDICINE

## 2019-07-01 RX ORDER — GINSENG 100 MG
CAPSULE ORAL
COMMUNITY
End: 2022-06-13

## 2019-07-01 NOTE — PROGRESS NOTES
"  Chief Complaint   Patient presents with   • Follow-up   • Hypertension   • Diabetes       Subjective   Bam Negron is an 58 y.o. male who presents for follow up of diabetes. Current symptoms include: hyperglycemia. Patient denies foot ulcerations. Evaluation to date has included: fasting blood sugar, fasting lipid panel, hemoglobin A1C and microalbuminuria. Home sugars: patient does not check sugars. Current treatments: more intensive attention to diet which has been not very effective, Continued metformin which has been somewhat effective, Continued statin which has been effective, Continued ACE inhibitor/ARB which has been somewhat effective and Continued Victoza and Januvia which has been somewhat effective. Discussed importance of yearly eye exams and checking feet for skin integrity.      The following portions of the patient's history were reviewed and updated as appropriate: allergies, current medications, past family history, past medical history, past social history, past surgical history and problem list.        Review of Systems  Pertinent items are noted in HPI.     Vitals:    07/01/19 1319   BP: 154/90   BP Location: Left arm   Patient Position: Sitting   Pulse: 81   SpO2: 96%   Weight: 112 kg (247 lb)   Height: 172.7 cm (68\")       Objective    Gen:  Alert, pleasant  Ears: canals clear, TMs normal  Throat: clear , no thrush, teeth ok  Neck: no bruit, no LAD  Lungs: clear  Heart: RR no murmur  Feet:  No rash, no skin breakdown, sensation grossly normal.    Laboratory:  Results for orders placed or performed in visit on 06/24/19   MicroAlbumin, Urine, Random - Urine, Clean Catch   Result Value Ref Range    Microalbumin, Urine 3.7 Not Estab. ug/mL   Basic Metabolic Panel   Result Value Ref Range    Glucose 192 (H) 65 - 99 mg/dL    BUN 14 6 - 20 mg/dL    Creatinine 0.78 0.76 - 1.27 mg/dL    eGFR Non African Am 102 >60 mL/min/1.73    eGFR African Am 124 >60 mL/min/1.73    BUN/Creatinine Ratio 17.9 7.0 " - 25.0    Sodium 143 136 - 145 mmol/L    Potassium 3.9 3.5 - 5.2 mmol/L    Chloride 101 98 - 107 mmol/L    Total CO2 28.5 22.0 - 29.0 mmol/L    Calcium 9.4 8.6 - 10.5 mg/dL   Hemoglobin A1c   Result Value Ref Range    Hemoglobin A1C 7.80 (H) 4.80 - 5.60 %        Assessment/Plan        Discussed general issues about diabetes pathophysiology and management.  Continued metformin; see  medication orders.  Continued statin drug see medication orders.  Continued ACE inhibitor; see medication orders.  Follow up in 3 months or as needed.     A1c and bp are up  He admits to not paying attention to eating or weight or exercise  He works third shift and has hard time finding time.  No more penile mycotic infections off the Jardiance.      Bam was seen today for follow-up, hypertension and diabetes.    Diagnoses and all orders for this visit:    Essential hypertension  -     Basic Metabolic Panel; Future    Mixed hyperlipidemia  -     Lipid Panel; Future    Type 2 diabetes mellitus without complication, without long-term current use of insulin (CMS/[facility])  -     Lipid Panel; Future  -     Basic Metabolic Panel; Future  -     Hemoglobin A1c; Future    Medication monitoring encounter  -     ALT; Future        Discussed healthy diabetic eating plan.  May refer to ADA web site, diabetes.org    Return in about 3 months (around 10/1/2019) for diabetes, blood pressure.  There are no Patient Instructions on file for this visit.  There are no discontinued medications.      Dr. Lalito Hansen MD  [place], [place]  [facility].

## 2019-09-05 DIAGNOSIS — E11.9 TYPE 2 DIABETES MELLITUS WITHOUT COMPLICATION, WITHOUT LONG-TERM CURRENT USE OF INSULIN (HCC): ICD-10-CM

## 2019-09-06 RX ORDER — LIRAGLUTIDE 6 MG/ML
INJECTION SUBCUTANEOUS
Qty: 9 PEN | Refills: 0 | Status: SHIPPED | OUTPATIENT
Start: 2019-09-06 | End: 2019-10-28 | Stop reason: SDUPTHER

## 2019-10-01 ENCOUNTER — RESULTS ENCOUNTER (OUTPATIENT)
Dept: FAMILY MEDICINE CLINIC | Facility: CLINIC | Age: 59
End: 2019-10-01

## 2019-10-01 DIAGNOSIS — I10 ESSENTIAL HYPERTENSION: ICD-10-CM

## 2019-10-01 DIAGNOSIS — E11.9 TYPE 2 DIABETES MELLITUS WITHOUT COMPLICATION, WITHOUT LONG-TERM CURRENT USE OF INSULIN (HCC): ICD-10-CM

## 2019-10-01 DIAGNOSIS — Z51.81 MEDICATION MONITORING ENCOUNTER: ICD-10-CM

## 2019-10-01 DIAGNOSIS — E78.2 MIXED HYPERLIPIDEMIA: ICD-10-CM

## 2019-10-11 DIAGNOSIS — E11.9 TYPE 2 DIABETES MELLITUS WITHOUT COMPLICATION, WITHOUT LONG-TERM CURRENT USE OF INSULIN (HCC): Primary | ICD-10-CM

## 2019-10-15 LAB
ALT SERPL-CCNC: 26 U/L (ref 1–41)
BUN SERPL-MCNC: 12 MG/DL (ref 6–20)
BUN/CREAT SERPL: 15 (ref 7–25)
CALCIUM SERPL-MCNC: 9.5 MG/DL (ref 8.6–10.5)
CHLORIDE SERPL-SCNC: 99 MMOL/L (ref 98–107)
CHOLEST SERPL-MCNC: 132 MG/DL (ref 0–200)
CO2 SERPL-SCNC: 30.5 MMOL/L (ref 22–29)
CREAT SERPL-MCNC: 0.8 MG/DL (ref 0.76–1.27)
GLUCOSE SERPL-MCNC: 133 MG/DL (ref 65–99)
HBA1C MFR BLD: 8.1 % (ref 4.8–5.6)
HDLC SERPL-MCNC: 42 MG/DL (ref 40–60)
LDLC SERPL CALC-MCNC: 59 MG/DL (ref 0–100)
MICROALBUMIN UR-MCNC: 9.2 UG/ML
POTASSIUM SERPL-SCNC: 4.4 MMOL/L (ref 3.5–5.2)
SODIUM SERPL-SCNC: 141 MMOL/L (ref 136–145)
TRIGL SERPL-MCNC: 157 MG/DL (ref 0–150)
VLDLC SERPL CALC-MCNC: 31.4 MG/DL

## 2019-10-28 ENCOUNTER — OFFICE VISIT (OUTPATIENT)
Dept: FAMILY MEDICINE CLINIC | Facility: CLINIC | Age: 59
End: 2019-10-28

## 2019-10-28 VITALS
WEIGHT: 248 LBS | BODY MASS INDEX: 37.59 KG/M2 | HEART RATE: 87 BPM | DIASTOLIC BLOOD PRESSURE: 78 MMHG | SYSTOLIC BLOOD PRESSURE: 138 MMHG | HEIGHT: 68 IN | OXYGEN SATURATION: 99 %

## 2019-10-28 DIAGNOSIS — E11.9 TYPE 2 DIABETES MELLITUS WITHOUT COMPLICATION, WITHOUT LONG-TERM CURRENT USE OF INSULIN (HCC): ICD-10-CM

## 2019-10-28 DIAGNOSIS — E78.2 MIXED HYPERLIPIDEMIA: ICD-10-CM

## 2019-10-28 DIAGNOSIS — I10 ESSENTIAL HYPERTENSION: Primary | ICD-10-CM

## 2019-10-28 DIAGNOSIS — Z51.81 MEDICATION MONITORING ENCOUNTER: ICD-10-CM

## 2019-10-28 DIAGNOSIS — J41.0 SIMPLE CHRONIC BRONCHITIS (HCC): ICD-10-CM

## 2019-10-28 DIAGNOSIS — M51.26 LUMBAR HERNIATED DISC: ICD-10-CM

## 2019-10-28 DIAGNOSIS — IMO0001 UNCONTROLLED DIABETES MELLITUS TYPE 2 WITHOUT COMPLICATIONS: ICD-10-CM

## 2019-10-28 PROCEDURE — 99214 OFFICE O/P EST MOD 30 MIN: CPT | Performed by: FAMILY MEDICINE

## 2019-10-28 RX ORDER — METAXALONE 800 MG/1
800 TABLET ORAL 3 TIMES DAILY PRN
Qty: 60 TABLET | Refills: 1 | Status: SHIPPED | OUTPATIENT
Start: 2019-10-28 | End: 2021-05-10 | Stop reason: SDUPTHER

## 2019-10-28 RX ORDER — ALBUTEROL SULFATE 90 UG/1
2 AEROSOL, METERED RESPIRATORY (INHALATION) EVERY 4 HOURS PRN
Qty: 1 INHALER | Refills: 1 | Status: SHIPPED | OUTPATIENT
Start: 2019-10-28 | End: 2021-05-10 | Stop reason: SDUPTHER

## 2019-10-28 RX ORDER — LIRAGLUTIDE 6 MG/ML
1.8 INJECTION SUBCUTANEOUS DAILY
Qty: 3 PEN | Refills: 11 | Status: SHIPPED | OUTPATIENT
Start: 2019-10-28 | End: 2020-11-23

## 2019-10-28 RX ORDER — GLIPIZIDE 5 MG/1
5 TABLET ORAL
Qty: 60 TABLET | Refills: 5 | Status: SHIPPED | OUTPATIENT
Start: 2019-10-28 | End: 2020-03-23 | Stop reason: SDUPTHER

## 2019-10-28 NOTE — PROGRESS NOTES
"  Chief Complaint   Patient presents with   • Diabetes   • Hypertension       Subjective   Bam Negron is an 58 y.o. male who presents for follow up of diabetes. Current symptoms include: hyperglycemia. Patient denies foot ulcerations. Evaluation to date has included: fasting blood sugar, fasting lipid panel, hemoglobin A1C and microalbuminuria. Home sugars: BGs are high in the morning. Current treatments: more intensive attention to diet which has been not very effective, low cholesterol diet which has been not very effective, increased aerobic exercise which has been not very effective, Continued metformin which has been somewhat effective, Continued statin which has been effective, Continued ACE inhibitor/ARB which has been effective and Continued januvia and victoza which has been somewhat effective. Discussed importance of yearly eye exams and checking feet for skin integrity.      The following portions of the patient's history were reviewed and updated as appropriate: allergies, current medications, past family history, past medical history, past social history, past surgical history and problem list.        Review of Systems  Pertinent items are noted in HPI.     Vitals:    10/28/19 1310   BP: 138/78   BP Location: Left arm   Patient Position: Sitting   Cuff Size: Adult   Pulse: 87   SpO2: 99%   Weight: 112 kg (248 lb)   Height: 172.7 cm (68\")       Objective    Gen:  Alert, pleasant  Ears: canals clear, TMs normal  Throat: clear , no thrush, teeth ok  Neck: no bruit, no LAD  Lungs: clear  Heart: RR no murmur  Feet:  No rash, no skin breakdown, sensation grossly normal.    Laboratory:  Results for orders placed or performed in visit on 10/01/19   ALT   Result Value Ref Range    ALT (SGPT) 26 1 - 41 U/L   Lipid Panel   Result Value Ref Range    Total Cholesterol 132 0 - 200 mg/dL    Triglycerides 157 (H) 0 - 150 mg/dL    HDL Cholesterol 42 40 - 60 mg/dL    VLDL Cholesterol 31.4 mg/dL    LDL Cholesterol  " 59 0 - 100 mg/dL   Basic Metabolic Panel   Result Value Ref Range    Glucose 133 (H) 65 - 99 mg/dL    BUN 12 6 - 20 mg/dL    Creatinine 0.80 0.76 - 1.27 mg/dL    eGFR Non African Am 99 >60 mL/min/1.73    eGFR African Am 120 >60 mL/min/1.73    BUN/Creatinine Ratio 15.0 7.0 - 25.0    Sodium 141 136 - 145 mmol/L    Potassium 4.4 3.5 - 5.2 mmol/L    Chloride 99 98 - 107 mmol/L    Total CO2 30.5 (H) 22.0 - 29.0 mmol/L    Calcium 9.5 8.6 - 10.5 mg/dL   Hemoglobin A1c   Result Value Ref Range    Hemoglobin A1C 8.10 (H) 4.80 - 5.60 %   MicroAlbumin, Urine, Random -   Result Value Ref Range    Microalbumin, Urine 9.2 Not Estab. ug/mL        Assessment/Plan        Discussed general issues about diabetes pathophysiology and management.  Restarted sulfonylurea; see medication orders.   Follow up in 4 months or as needed.    Bam was seen today for diabetes and hypertension.    Diagnoses and all orders for this visit:    Essential hypertension    Mixed hyperlipidemia    Uncontrolled diabetes mellitus type 2 without complications (CMS/HCC)  -     MicroAlbumin, Urine, Random - Urine, Clean Catch; Future  -     Basic Metabolic Panel; Future  -     ALT; Future  -     Lipid Panel; Future  -     Hemoglobin A1c; Future    Type 2 diabetes mellitus without complication, without long-term current use of insulin (CMS/HCC)  -     glipizide (GLUCOTROL) 5 MG tablet; Take 1 tablet by mouth 2 (Two) Times a Day Before Meals.  -     VICTOZA 18 MG/3ML solution pen-injector injection; Inject 1.8 mg under the skin into the appropriate area as directed Daily.    Simple chronic bronchitis (CMS/HCC)  -     albuterol sulfate  (90 Base) MCG/ACT inhaler; Inhale 2 puffs Every 4 (Four) Hours As Needed for Wheezing.    Lumbar herniated disc  -     metaxalone (SKELAXIN) 800 MG tablet; Take 1 tablet by mouth 3 (Three) Times a Day As Needed for Muscle Spasms.    Medication monitoring encounter  -     ALT; Future      Will restart glipizide  FArxiga  caused penile rash.    Also fell down his front porch steps this weekend pulling up a new dryer.    Discussed healthy diabetic eating plan.  May refer to ADA web site, diabetes.org    Return in about 4 months (around 2/28/2020) for diabetes, new medication follow up.  There are no Patient Instructions on file for this visit.  Medications Discontinued During This Encounter   Medication Reason   • VICTOZA 18 MG/3ML solution pen-injector injection Reorder   • albuterol 108 (90 Base) MCG/ACT inhaler Reorder         Dr. Lalito Hansen MD  [place], [place]  [facility].

## 2019-12-15 DIAGNOSIS — M51.26 LUMBAR HERNIATED DISC: ICD-10-CM

## 2019-12-16 RX ORDER — DULOXETIN HYDROCHLORIDE 60 MG/1
CAPSULE, DELAYED RELEASE ORAL
Qty: 90 CAPSULE | Refills: 0 | Status: SHIPPED | OUTPATIENT
Start: 2019-12-16 | End: 2020-03-09

## 2020-01-05 DIAGNOSIS — I10 ESSENTIAL HYPERTENSION: ICD-10-CM

## 2020-01-06 RX ORDER — AMLODIPINE BESYLATE AND BENAZEPRIL HYDROCHLORIDE 10; 40 MG/1; MG/1
CAPSULE ORAL
Qty: 90 CAPSULE | Refills: 2 | Status: SHIPPED | OUTPATIENT
Start: 2020-01-06 | End: 2020-10-12

## 2020-01-26 DIAGNOSIS — E11.9 TYPE 2 DIABETES MELLITUS WITHOUT COMPLICATION, WITHOUT LONG-TERM CURRENT USE OF INSULIN (HCC): ICD-10-CM

## 2020-01-26 DIAGNOSIS — I10 ESSENTIAL HYPERTENSION: ICD-10-CM

## 2020-01-27 RX ORDER — CARVEDILOL 25 MG/1
TABLET ORAL
Qty: 180 TABLET | Refills: 0 | Status: SHIPPED | OUTPATIENT
Start: 2020-01-27 | End: 2020-03-23 | Stop reason: SDUPTHER

## 2020-01-28 ENCOUNTER — OFFICE VISIT (OUTPATIENT)
Dept: FAMILY MEDICINE CLINIC | Facility: CLINIC | Age: 60
End: 2020-01-28

## 2020-01-28 VITALS
DIASTOLIC BLOOD PRESSURE: 84 MMHG | HEART RATE: 78 BPM | SYSTOLIC BLOOD PRESSURE: 148 MMHG | OXYGEN SATURATION: 99 % | WEIGHT: 252 LBS | BODY MASS INDEX: 38.19 KG/M2 | HEIGHT: 68 IN

## 2020-01-28 DIAGNOSIS — J40 BRONCHITIS: Primary | ICD-10-CM

## 2020-01-28 PROCEDURE — 99213 OFFICE O/P EST LOW 20 MIN: CPT | Performed by: FAMILY MEDICINE

## 2020-01-28 RX ORDER — AMOXICILLIN 500 MG/1
500 TABLET, FILM COATED ORAL 3 TIMES DAILY
Qty: 21 TABLET | Refills: 0 | Status: SHIPPED | OUTPATIENT
Start: 2020-01-28 | End: 2020-02-04

## 2020-01-28 NOTE — PROGRESS NOTES
"  Chief Complaint   Patient presents with   • Cough     winded, productive cough since last thursday    • Headache   • Fatigue       Upper Respiratory Infection: Patient complains of symptoms of a URI. Symptoms include congestion and cough. Onset of symptoms was 1 week ago, gradually worsening since that time. He also c/o productive cough with  yellow colored sputum for the past 3 days .  He is drinking moderate amounts of fluids. Evaluation to date: none. Treatment to date: cough suppressants.  Ill contacts at home or school or work discussed.      Vitals:    01/28/20 1024   BP: 148/84   BP Location: Left arm   Patient Position: Sitting   Cuff Size: Adult   Pulse: 78   SpO2: 99%   Weight: 114 kg (252 lb)   Height: 172.7 cm (68\")     Gen: mildly ill appearing, alert  Ears: Tm's normal without redness  Nose:  Congestion  Throat:  Red without exudate, some drainage, tonsils okay  Neck: no LAD  Lung: mild rales, good air movement, regular RR  Heart: RR without murmur  Skin: no rash.      Assessment/Plan   Bam was seen today for cough, headache and fatigue.    Diagnoses and all orders for this visit:    Bronchitis  -     amoxicillin (AMOXIL) 500 MG tablet; Take 1 tablet by mouth 3 (Three) Times a Day for 7 days.             There are no Patient Instructions on file for this visit.    Tylenol or Advil as needed for pain, fever, muscle aches  Plenty of fluids  Hand washing discussed  Off work or school note given if needed.  Warm tea for throat.  Pros and cons of antibiotic use discussed    Dr. Lalito Hansen MD  [place], [place]  [facility]  "

## 2020-02-21 DIAGNOSIS — Z51.81 MEDICATION MONITORING ENCOUNTER: ICD-10-CM

## 2020-02-21 DIAGNOSIS — IMO0001 UNCONTROLLED DIABETES MELLITUS TYPE 2 WITHOUT COMPLICATIONS: ICD-10-CM

## 2020-02-25 LAB
ALT SERPL-CCNC: 30 IU/L (ref 0–44)
BUN SERPL-MCNC: 15 MG/DL (ref 6–24)
BUN/CREAT SERPL: 18 (ref 9–20)
CALCIUM SERPL-MCNC: 9.6 MG/DL (ref 8.7–10.2)
CHLORIDE SERPL-SCNC: 101 MMOL/L (ref 96–106)
CHOLEST SERPL-MCNC: 157 MG/DL (ref 100–199)
CO2 SERPL-SCNC: 27 MMOL/L (ref 20–29)
CREAT SERPL-MCNC: 0.83 MG/DL (ref 0.76–1.27)
GLUCOSE SERPL-MCNC: 165 MG/DL (ref 65–99)
HBA1C MFR BLD: 8.2 % (ref 4.8–5.6)
HDLC SERPL-MCNC: 43 MG/DL
LDLC SERPL CALC-MCNC: 79 MG/DL (ref 0–99)
MICROALBUMIN UR-MCNC: 50.4 UG/ML
POTASSIUM SERPL-SCNC: 4.2 MMOL/L (ref 3.5–5.2)
SODIUM SERPL-SCNC: 144 MMOL/L (ref 134–144)
TRIGL SERPL-MCNC: 176 MG/DL (ref 0–149)
VLDLC SERPL CALC-MCNC: 35 MG/DL (ref 5–40)

## 2020-02-28 ENCOUNTER — OFFICE VISIT (OUTPATIENT)
Dept: FAMILY MEDICINE CLINIC | Facility: CLINIC | Age: 60
End: 2020-02-28

## 2020-02-28 VITALS
SYSTOLIC BLOOD PRESSURE: 140 MMHG | RESPIRATION RATE: 16 BRPM | TEMPERATURE: 98.4 F | WEIGHT: 259 LBS | OXYGEN SATURATION: 97 % | HEIGHT: 68 IN | HEART RATE: 94 BPM | DIASTOLIC BLOOD PRESSURE: 82 MMHG | BODY MASS INDEX: 39.25 KG/M2

## 2020-02-28 DIAGNOSIS — B02.9 HERPES ZOSTER WITHOUT COMPLICATION: Primary | ICD-10-CM

## 2020-02-28 DIAGNOSIS — B02.29 NEURALGIA, POST-HERPETIC: ICD-10-CM

## 2020-02-28 PROCEDURE — 99212 OFFICE O/P EST SF 10 MIN: CPT | Performed by: NURSE PRACTITIONER

## 2020-02-28 RX ORDER — GABAPENTIN 100 MG/1
100 CAPSULE ORAL 3 TIMES DAILY PRN
Qty: 30 CAPSULE | Refills: 0 | Status: SHIPPED | OUTPATIENT
Start: 2020-02-28 | End: 2020-07-22

## 2020-03-02 ENCOUNTER — TELEPHONE (OUTPATIENT)
Dept: FAMILY MEDICINE CLINIC | Facility: CLINIC | Age: 60
End: 2020-03-02

## 2020-03-02 NOTE — TELEPHONE ENCOUNTER
Pt saw you on Friday for shingles, his wife just called stating a new spot has developed on his ankle. Only symptom is itching. Asking if anything else can be sent in? Please advise

## 2020-03-02 NOTE — TELEPHONE ENCOUNTER
Difficult to say without being seen.  Unlikely related to what he was seen for due to rash being on back of head.  Apply OTC hydrocortisone cream as needed and notify us if no improvement.

## 2020-03-05 DIAGNOSIS — B02.9 HERPES ZOSTER WITHOUT COMPLICATION: ICD-10-CM

## 2020-03-05 DIAGNOSIS — B02.29 NEURALGIA, POST-HERPETIC: ICD-10-CM

## 2020-03-05 RX ORDER — GABAPENTIN 100 MG/1
CAPSULE ORAL
Qty: 30 CAPSULE | Refills: 0 | OUTPATIENT
Start: 2020-03-05

## 2020-03-08 DIAGNOSIS — M51.26 LUMBAR HERNIATED DISC: ICD-10-CM

## 2020-03-08 DIAGNOSIS — E78.2 MIXED HYPERLIPIDEMIA: ICD-10-CM

## 2020-03-09 DIAGNOSIS — IMO0001 UNCONTROLLED DIABETES MELLITUS TYPE 2 WITHOUT COMPLICATIONS: ICD-10-CM

## 2020-03-09 RX ORDER — ATORVASTATIN CALCIUM 80 MG/1
TABLET, FILM COATED ORAL
Qty: 90 TABLET | Refills: 0 | Status: SHIPPED | OUTPATIENT
Start: 2020-03-09 | End: 2020-06-15

## 2020-03-09 RX ORDER — DULOXETIN HYDROCHLORIDE 60 MG/1
CAPSULE, DELAYED RELEASE ORAL
Qty: 90 CAPSULE | Refills: 0 | Status: SHIPPED | OUTPATIENT
Start: 2020-03-09 | End: 2020-06-15

## 2020-03-09 RX ORDER — SITAGLIPTIN 100 MG/1
TABLET, FILM COATED ORAL
Qty: 30 TABLET | Refills: 0 | Status: SHIPPED | OUTPATIENT
Start: 2020-03-09 | End: 2020-03-23 | Stop reason: SDUPTHER

## 2020-03-13 ENCOUNTER — OFFICE VISIT (OUTPATIENT)
Dept: FAMILY MEDICINE CLINIC | Facility: CLINIC | Age: 60
End: 2020-03-13

## 2020-03-13 VITALS
HEIGHT: 68 IN | OXYGEN SATURATION: 97 % | SYSTOLIC BLOOD PRESSURE: 140 MMHG | HEART RATE: 79 BPM | DIASTOLIC BLOOD PRESSURE: 74 MMHG | BODY MASS INDEX: 38.65 KG/M2 | WEIGHT: 255 LBS

## 2020-03-13 DIAGNOSIS — B02.22 TRIGEMINAL HERPES ZOSTER: Primary | ICD-10-CM

## 2020-03-13 PROCEDURE — 99213 OFFICE O/P EST LOW 20 MIN: CPT | Performed by: FAMILY MEDICINE

## 2020-03-13 RX ORDER — VALACYCLOVIR HYDROCHLORIDE 1 G/1
1000 TABLET, FILM COATED ORAL 3 TIMES DAILY
Qty: 21 TABLET | Refills: 0 | Status: SHIPPED | OUTPATIENT
Start: 2020-03-13 | End: 2022-02-14

## 2020-03-13 NOTE — PROGRESS NOTES
Subjective   Bam Negron is a 59 y.o. male who is here for   Chief Complaint   Patient presents with   • Blister     on scalp- itching, new spot on other side of head hurts, saw Buffy for same thing last month    .     History of Present Illness   Shingles on right scalp behind ear  Now has spread to left scalp behind ear.  Uncontrolled diabetic, high risk    The following portions of the patient's history were reviewed and updated as appropriate: allergies, current medications, past family history, past medical history, past social history, past surgical history and problem list.    Review of Systems    Objective   Physical Exam   HENT:   Head: Head is without right periorbital erythema and without left periorbital erythema.       Nursing note and vitals reviewed.      Assessment/Plan   Bam was seen today for blister.    Diagnoses and all orders for this visit:    Trigeminal herpes zoster  -     valACYclovir (VALTREX) 1000 MG tablet; Take 1 tablet by mouth 3 (Three) Times a Day. Indications: Infection of the Skin and/or Soft Tissue  -     hydrocortisone 2.5 % ointment; Apply  topically to the appropriate area as directed 3 (Three) Times a Day As Needed for Irritation or Rash.      There are no Patient Instructions on file for this visit.    There are no discontinued medications.     No follow-ups on file.    Dr. Lalito Hansen  [place], [place]

## 2020-03-16 ENCOUNTER — TELEPHONE (OUTPATIENT)
Dept: FAMILY MEDICINE CLINIC | Facility: CLINIC | Age: 60
End: 2020-03-16

## 2020-03-16 NOTE — TELEPHONE ENCOUNTER
Pt wife called to see if it would be ok to let the patient take the rest of this week off from work. Please advise

## 2020-03-19 ENCOUNTER — OFFICE VISIT (OUTPATIENT)
Dept: SLEEP MEDICINE | Facility: HOSPITAL | Age: 60
End: 2020-03-19

## 2020-03-19 VITALS
HEART RATE: 78 BPM | DIASTOLIC BLOOD PRESSURE: 92 MMHG | WEIGHT: 250 LBS | HEIGHT: 68 IN | SYSTOLIC BLOOD PRESSURE: 144 MMHG | BODY MASS INDEX: 37.89 KG/M2

## 2020-03-19 DIAGNOSIS — G47.33 OBSTRUCTIVE SLEEP APNEA SYNDROME: Primary | ICD-10-CM

## 2020-03-19 PROCEDURE — 99213 OFFICE O/P EST LOW 20 MIN: CPT | Performed by: FAMILY MEDICINE

## 2020-03-19 PROCEDURE — G0463 HOSPITAL OUTPT CLINIC VISIT: HCPCS

## 2020-03-23 ENCOUNTER — OFFICE VISIT (OUTPATIENT)
Dept: FAMILY MEDICINE CLINIC | Facility: CLINIC | Age: 60
End: 2020-03-23

## 2020-03-23 ENCOUNTER — OFFICE VISIT (OUTPATIENT)
Dept: GASTROENTEROLOGY | Facility: CLINIC | Age: 60
End: 2020-03-23

## 2020-03-23 VITALS
HEART RATE: 82 BPM | SYSTOLIC BLOOD PRESSURE: 140 MMHG | WEIGHT: 251 LBS | HEIGHT: 68 IN | OXYGEN SATURATION: 98 % | DIASTOLIC BLOOD PRESSURE: 84 MMHG | BODY MASS INDEX: 38.04 KG/M2

## 2020-03-23 VITALS — BODY MASS INDEX: 37.92 KG/M2 | TEMPERATURE: 97.8 F | HEIGHT: 68 IN | WEIGHT: 250.2 LBS

## 2020-03-23 DIAGNOSIS — E78.2 MIXED HYPERLIPIDEMIA: ICD-10-CM

## 2020-03-23 DIAGNOSIS — R10.12 LEFT UPPER QUADRANT PAIN: ICD-10-CM

## 2020-03-23 DIAGNOSIS — Z86.010 PERSONAL HISTORY OF COLONIC POLYPS: Primary | ICD-10-CM

## 2020-03-23 DIAGNOSIS — I10 ESSENTIAL HYPERTENSION: Primary | ICD-10-CM

## 2020-03-23 DIAGNOSIS — E11.9 TYPE 2 DIABETES MELLITUS WITHOUT COMPLICATION, WITHOUT LONG-TERM CURRENT USE OF INSULIN (HCC): ICD-10-CM

## 2020-03-23 DIAGNOSIS — IMO0001 UNCONTROLLED DIABETES MELLITUS TYPE 2 WITHOUT COMPLICATIONS: ICD-10-CM

## 2020-03-23 DIAGNOSIS — R19.7 DIARRHEA, UNSPECIFIED TYPE: ICD-10-CM

## 2020-03-23 PROBLEM — Z86.0100 PERSONAL HISTORY OF COLONIC POLYPS: Status: ACTIVE | Noted: 2020-03-23

## 2020-03-23 PROCEDURE — 99213 OFFICE O/P EST LOW 20 MIN: CPT | Performed by: FAMILY MEDICINE

## 2020-03-23 PROCEDURE — 99204 OFFICE O/P NEW MOD 45 MIN: CPT | Performed by: INTERNAL MEDICINE

## 2020-03-23 RX ORDER — OMEPRAZOLE 20 MG/1
20 CAPSULE, DELAYED RELEASE ORAL DAILY
Qty: 90 CAPSULE | Refills: 3 | Status: SHIPPED | OUTPATIENT
Start: 2020-03-23 | End: 2021-03-22

## 2020-03-23 RX ORDER — GLIPIZIDE 10 MG/1
10 TABLET ORAL
Qty: 180 TABLET | Refills: 3 | Status: SHIPPED | OUTPATIENT
Start: 2020-03-23 | End: 2021-02-01 | Stop reason: SDUPTHER

## 2020-03-23 RX ORDER — SITAGLIPTIN 100 MG/1
100 TABLET, FILM COATED ORAL DAILY
Qty: 30 TABLET | Refills: 11 | Status: SHIPPED | OUTPATIENT
Start: 2020-03-23 | End: 2021-02-01 | Stop reason: SDUPTHER

## 2020-03-23 RX ORDER — CARVEDILOL 25 MG/1
25 TABLET ORAL 2 TIMES DAILY WITH MEALS
Qty: 180 TABLET | Refills: 3 | Status: SHIPPED | OUTPATIENT
Start: 2020-03-23 | End: 2021-02-01 | Stop reason: SDUPTHER

## 2020-03-23 NOTE — PROGRESS NOTES
"  Chief Complaint   Patient presents with   • Diabetes   • Hyperlipidemia       Subjective   Bam Negron is an 59 y.o. male who presents for follow up of diabetes. Current symptoms include: hyperglycemia. Patient denies foot ulcerations, paresthesia of the feet and visual disturbances. Evaluation to date has included: fasting blood sugar, fasting lipid panel, hemoglobin A1C and microalbuminuria. Home sugars: BGs are high in the morning. Current treatments: more intensive attention to diet which has been ineffective, low cholesterol diet which has been ineffective, increased aerobic exercise which has been ineffective, Continued sulfonylurea which has been somewhat effective, Continued metformin which has been somewhat effective, Continued statin which has been effective, Continued ACE inhibitor/ARB which has been effective and Continued januvia and Victoza which has been somewhat effective. Discussed importance of yearly eye exams and checking feet for skin integrity.      The following portions of the patient's history were reviewed and updated as appropriate: allergies, current medications, past family history, past medical history, past social history, past surgical history and problem list.        Review of Systems  Pertinent items are noted in HPI.     Vitals:    03/23/20 1436   BP: 140/84   BP Location: Left arm   Patient Position: Sitting   Cuff Size: Adult   Pulse: 82   SpO2: 98%   Weight: 114 kg (251 lb)   Height: 172.7 cm (67.99\")       Objective    Gen:  Alert, pleasant  Ears: canals clear, TMs normal  Throat: clear , no thrush, teeth ok  Neck: no bruit, no LAD  Lungs: clear  Heart: RR no murmur  Feet:  No rash, no skin breakdown, sensation grossly normal.    Laboratory:  Results for orders placed or performed in visit on 02/21/20   Hemoglobin A1c   Result Value Ref Range    Hemoglobin A1C 8.2 (H) 4.8 - 5.6 %   Lipid Panel   Result Value Ref Range    Total Cholesterol 157 100 - 199 mg/dL    " Triglycerides 176 (H) 0 - 149 mg/dL    HDL Cholesterol 43 >39 mg/dL    VLDL Cholesterol 35 5 - 40 mg/dL    LDL Cholesterol  79 0 - 99 mg/dL   ALT   Result Value Ref Range    ALT (SGPT) 30 0 - 44 IU/L   Basic Metabolic Panel   Result Value Ref Range    Glucose 165 (H) 65 - 99 mg/dL    BUN 15 6 - 24 mg/dL    Creatinine 0.83 0.76 - 1.27 mg/dL    eGFR Non African Am 96 >59 mL/min/1.73    eGFR African Am 111 >59 mL/min/1.73    BUN/Creatinine Ratio 18 9 - 20    Sodium 144 134 - 144 mmol/L    Potassium 4.2 3.5 - 5.2 mmol/L    Chloride 101 96 - 106 mmol/L    Total CO2 27 20 - 29 mmol/L    Calcium 9.6 8.7 - 10.2 mg/dL   MicroAlbumin, Urine, Random - Urine, Clean Catch   Result Value Ref Range    Microalbumin, Urine 50.4 Not Estab. ug/mL        Assessment/Plan        Discussed general issues about diabetes pathophysiology and management.  Addressed ADA diet.  Suggested low cholesterol diet.  Encouraged aerobic exercise.  Discussed foot care.  Reminded to get yearly retinal exam.  Increased dose of sulfonylurea; see medication orders.   Continued metformin; see  medication orders.  Continued statin drug see medication orders.  Continued ACE inhibitor; see medication orders.  Follow up in 4 months or as needed.    Bam was seen today for diabetes and hyperlipidemia.    Diagnoses and all orders for this visit:    Essential hypertension  -     carvedilol (COREG) 25 MG tablet; Take 1 tablet by mouth 2 (Two) Times a Day With Meals. Indications: High Blood Pressure Disorder    Mixed hyperlipidemia    Uncontrolled diabetes mellitus type 2 without complications (CMS/[facility])  -     JANUVIA 100 MG tablet; Take 1 tablet by mouth Daily. Indications: Type 2 Diabetes  -     MicroAlbumin, Urine, Random - Urine, Clean Catch; Future  -     Basic Metabolic Panel; Future  -     Hemoglobin A1c; Future    Type 2 diabetes mellitus without complication, without long-term current use of insulin (CMS/[facility])  -     glipizide (GLUCOTROL) 10 MG tablet; Take  1 tablet by mouth 2 (Two) Times a Day Before Meals. Indications: Type 2 Diabetes  -     metFORMIN (GLUCOPHAGE) 1000 MG tablet; Take 1 tablet by mouth 2 (Two) Times a Day With Meals. Indications: Type 2 Diabetes    we can double glipizide dose.      Shingles on his scalp is all clear.    Okay to return to work.    Discussed healthy diabetic eating plan.  May refer to ADA web site, diabetes.org    Return in about 4 months (around 7/23/2020) for diabetes, new medication follow up.  There are no Patient Instructions on file for this visit.  Medications Discontinued During This Encounter   Medication Reason   • carvedilol (COREG) 25 MG tablet Reorder   • glipizide (GLUCOTROL) 5 MG tablet Reorder   • JANUVIA 100 MG tablet Reorder   • metFORMIN (GLUCOPHAGE) 1000 MG tablet Reorder         Dr. Lalito Hansen MD  [place], [place]  [facility].

## 2020-03-23 NOTE — PROGRESS NOTES
PATIENT INFORMATION  Bam Negron       - 1960    CHIEF COMPLAINT  Chief Complaint   Patient presents with   • Abdominal Pain   • Diarrhea       HISTORY OF PRESENT ILLNESS  Have reviewed his last colon in 2016 with piecemeal polypectomy.    Diarrhea this year with nausea. OUt of a week he will have 5-6 days a week will have 4-6 loose BMs a day and so far incontinent and not while he is sleeping. No association of food but is not compliant with Diet nor exercise.    Did go through Abx in January           REVIEW OF SYSTEMS  Review of Systems   HENT: Positive for postnasal drip, rhinorrhea, sneezing and tinnitus.    Eyes: Positive for itching.   Respiratory: Positive for apnea, shortness of breath, wheezing and stridor.    Gastrointestinal: Positive for abdominal distention, abdominal pain, diarrhea and nausea.   Endocrine: Positive for polydipsia.   Musculoskeletal: Positive for arthralgias and back pain.   Allergic/Immunologic: Positive for environmental allergies and food allergies.   Psychiatric/Behavioral: The patient is nervous/anxious.    All other systems reviewed and are negative.        ACTIVE PROBLEMS  Patient Active Problem List    Diagnosis   • Personal history of colonic polyps [Z86.010]   • Left upper quadrant pain [R10.12]   • Uncontrolled diabetes mellitus type 2 without complications (CMS/HCC) [E11.65]   • Arthritis [M19.90]   • Screening for prostate cancer [Z12.5]   • Lumbar herniated disc [M51.26]   • Status post total right knee replacement [Z96.651]   • Preoperative clearance [Z01.818]   • Encounter for pre-operative cardiovascular clearance [Z01.810]   • Allergic rhinitis due to animal hair and dander [J30.81]   • Medication monitoring encounter [Z51.81]   • Old anteroseptal myocardial infarction [I25.2]   • Primary osteoarthritis of both knees [M17.0]   • Atopic rhinitis [J30.9]   • Cataract [H26.9]   • Simple chronic bronchitis (CMS/HCC) [J41.0]   • CAD (coronary artery  disease) [I25.10]   • Depression [F32.9]   • Mixed hyperlipidemia [E78.2]   • Essential hypertension [I10]   • Hypogonadism in male [E29.1]   • Macular degeneration [H35.30]   • Obesity (BMI 30-39.9) [E66.9]   • Obstructive sleep apnea syndrome [G47.33]   • Type 2 diabetes mellitus without complication, without long-term current use of insulin (CMS/HCC) [E11.9]   • Abnormal EKG [R94.31]   • APC (atrial premature contractions) [I49.1]   • Diastolic dysfunction [I51.89]         PAST MEDICAL HISTORY  Past Medical History:   Diagnosis Date   • Anxiety    • Arthritis     OSTEO   • Asthma    • Blood type O+ 10/04/2016    By lab confirmation   • Colon polyp    • Coronary artery disease     Cardiac catheterization December 2011 severe single coronary artery disease of the left anterior descending completely occluded with good collateralization and also showed moderate left ventricular diastolic dysfunction   • Depression    • Deviated septum    • Diastolic dysfunction     Demonstrated on cardiac cath    • Herniated lumbar intervertebral disc     THREE L 3, L4,L5   • History of broken collarbone     bilateral   • Hypogonadism male    • Left knee DJD    • Lumbar herniated disc     L3 & L5, h/o lumbar epidurals   • Myocardial infarction (CMS/HCC)     Anterior MI per electrocardiogram, Dr Barno follows   • Obstructive sleep apnea on CPAP 07/09/2013    Sleep Study completed by Dr. Bennett   • S/P cervical spinal fusion 2014    C 4-5,    • Sciatic pain     right leg   • Small bowel obstruction (CMS/HCC) 2013         SURGICAL HISTORY  Past Surgical History:   Procedure Laterality Date   • CARDIAC CATHETERIZATION  12/27/2011   • CERVICAL FUSION  12/31/2014    cervical fusion   • COLONOSCOPY  03/2010   • COLONOSCOPY N/A 9/23/2016    Procedure: COLONOSCOPY with polypectomy/tattooing;  Surgeon: Jonna Osborn MD;  Location: [facility];  Service:    • EPIDURAL      lumbar epidurals   • PILONIDAL CYSTECTOMY      x4   • AZ TOTAL  KNEE ARTHROPLASTY Right 10/17/2016    Procedure: TOTAL KNEE ARTHROPLASTY  KRIS--ANTIBIOTIC CEMENT ;  Surgeon: Roger Stone MD;  Location:  [facility];  Service: Orthopedics   • SEPTOPLASTY Bilateral 2018    Procedure: NASAL SEPTOPLASTY BILATERAL INFERIOR TURBINECTOMY;  Surgeon: Adiel Thompson MD;  Location:  [facility];  Service: ENT         FAMILY HISTORY  Family History   Problem Relation Age of Onset   • Macular degeneration Mother    • Heart attack Father    • Heart failure Father    • Macular degeneration Father    • Arthritis Sister    • Hypertension Brother    • Malig Hyperthermia Neg Hx    • Colon cancer Neg Hx    • Colon polyps Neg Hx          SOCIAL HISTORY  Social History     Occupational History   • Occupation: retired   Tobacco Use   • Smoking status: Former Smoker     Packs/day: 3.00     Years: 20.00     Pack years: 60.00     Types: Cigarettes     Last attempt to quit:      Years since quittin.2   • Smokeless tobacco: Never Used   Substance and Sexual Activity   • Alcohol use: Yes     Alcohol/week: 1.0 standard drinks     Types: 1 Cans of beer per week     Comment: SOCIAL DRINKER - rarely   • Drug use: No   • Sexual activity: Yes     Partners: Female       Debilities/Disabilities Identified: None    Emotional Behavior: Appropriate    CURRENT MEDICATIONS    Current Outpatient Medications:   •  albuterol sulfate  (90 Base) MCG/ACT inhaler, Inhale 2 puffs Every 4 (Four) Hours As Needed for Wheezing., Disp: 1 inhaler, Rfl: 1  •  amLODIPine-benazepril (LOTREL) 10-40 MG per capsule, TAKE ONE CAPSULE BY MOUTH DAILY, Disp: 90 capsule, Rfl: 2  •  aspirin 81 MG EC tablet, Take 81 mg by mouth Every Night. HOLD PRIOR TO SURGERY, Disp: , Rfl:   •  atorvastatin (LIPITOR) 80 MG tablet, TAKE ONE TABLET BY MOUTH DAILY, Disp: 90 tablet, Rfl: 0  •  carvedilol (COREG) 25 MG tablet, TAKE ONE TABLET BY MOUTH TWICE A DAY WITH MEALS, Disp: 180 tablet, Rfl: 0  •  DULoxetine (CYMBALTA) 60 MG  capsule, TAKE ONE CAPSULE BY MOUTH DAILY, Disp: 90 capsule, Rfl: 0  •  gabapentin (NEURONTIN) 100 MG capsule, Take 1 capsule by mouth 3 (Three) Times a Day As Needed (neuralgia)., Disp: 30 capsule, Rfl: 0  •  Ginseng (GIN-ZING) 100 MG capsule, Take  by mouth., Disp: , Rfl:   •  glipizide (GLUCOTROL) 5 MG tablet, Take 1 tablet by mouth 2 (Two) Times a Day Before Meals., Disp: 60 tablet, Rfl: 5  •  glucose blood test strip, Use as instructed to test blood sugar 3 times daily., Disp: 300 each, Rfl: 3  •  hydrocortisone 2.5 % ointment, Apply  topically to the appropriate area as directed 3 (Three) Times a Day As Needed for Irritation or Rash., Disp: 20 g, Rfl: 0  •  Insulin Pen Needle 31G X 5 MM misc, 1 Units Every Morning. USE WITH PEN TWICE A DAY AS DIRECTED, Disp: 100 each, Rfl: 4  •  JANUVIA 100 MG tablet, TAKE ONE TABLET BY MOUTH DAILY, Disp: 30 tablet, Rfl: 0  •  metaxalone (SKELAXIN) 800 MG tablet, Take 1 tablet by mouth 3 (Three) Times a Day As Needed for Muscle Spasms., Disp: 60 tablet, Rfl: 1  •  metFORMIN (GLUCOPHAGE) 1000 MG tablet, TAKE ONE TABLET BY MOUTH TWICE A DAY WITH MEALS, Disp: 180 tablet, Rfl: 0  •  Multiple Vitamins-Minerals (QC MULTI-DEYSI 50 & OVER PO), Take 2 tablets by mouth Every Morning., Disp: , Rfl:   •  naproxen sodium (ALEVE) 220 MG tablet, Take 440 mg by mouth. HOLD PRIOR TO SURGERY, Disp: , Rfl:   •  omeprazole (priLOSEC) 20 MG capsule, Take 1 capsule by mouth Daily., Disp: 90 capsule, Rfl: 3  •  ONETOUCH DELICA LANCETS 33G misc, Use as directed to test blood sugar 3 times daily., Disp: 300 each, Rfl: 3  •  valACYclovir (VALTREX) 1000 MG tablet, Take 1 tablet by mouth 3 (Three) Times a Day. Indications: Infection of the Skin and/or Soft Tissue, Disp: 21 tablet, Rfl: 0  •  VICTOZA 18 MG/3ML solution pen-injector injection, Inject 1.8 mg under the skin into the appropriate area as directed Daily., Disp: 3 pen, Rfl: 11    ALLERGIES  Farxiga [dapagliflozin]    VITALS  Vitals:    03/23/20  "1239   Temp: 97.8 °F (36.6 °C)   TempSrc: Oral   Weight: 113 kg (250 lb 3.2 oz)   Height: 172.7 cm (67.99\")       LAST RESULTS   Orders Only on 02/21/2020   Component Date Value Ref Range Status   • Hemoglobin A1C 02/24/2020 8.2* 4.8 - 5.6 % Final    Comment:          Prediabetes: 5.7 - 6.4           Diabetes: >6.4           Glycemic control for adults with diabetes: <7.0     • Total Cholesterol 02/24/2020 157  100 - 199 mg/dL Final   • Triglycerides 02/24/2020 176* 0 - 149 mg/dL Final   • HDL Cholesterol 02/24/2020 43  >39 mg/dL Final   • VLDL Cholesterol 02/24/2020 35  5 - 40 mg/dL Final   • LDL Cholesterol  02/24/2020 79  0 - 99 mg/dL Final   • ALT (SGPT) 02/24/2020 30  0 - 44 IU/L Final   • Glucose 02/24/2020 165* 65 - 99 mg/dL Final   • BUN 02/24/2020 15  6 - 24 mg/dL Final   • Creatinine 02/24/2020 0.83  0.76 - 1.27 mg/dL Final   • eGFR Non  Am 02/24/2020 96  >59 mL/min/1.73 Final   • eGFR African Am 02/24/2020 111  >59 mL/min/1.73 Final   • BUN/Creatinine Ratio 02/24/2020 18  9 - 20 Final   • Sodium 02/24/2020 144  134 - 144 mmol/L Final   • Potassium 02/24/2020 4.2  3.5 - 5.2 mmol/L Final   • Chloride 02/24/2020 101  96 - 106 mmol/L Final   • Total CO2 02/24/2020 27  20 - 29 mmol/L Final   • Calcium 02/24/2020 9.6  8.7 - 10.2 mg/dL Final   • Microalbumin, Urine 02/24/2020 50.4  Not Estab. ug/mL Final     No results found.    PHYSICAL EXAM  Physical Exam   Constitutional: He is oriented to person, place, and time. He appears well-developed and well-nourished.   HENT:   Head: Normocephalic and atraumatic.   Eyes: Pupils are equal, round, and reactive to light. Conjunctivae and EOM are normal. No scleral icterus.   Neck: Normal range of motion. Neck supple. No thyromegaly present.   Cardiovascular: Normal rate, regular rhythm, normal heart sounds and intact distal pulses. Exam reveals no gallop.   No murmur heard.  Pulmonary/Chest: Effort normal and breath sounds normal. He has no wheezes. He has no " rales.   Abdominal: Soft. Bowel sounds are normal. He exhibits no shifting dullness, no distension, no fluid wave, no abdominal bruit, no ascites and no mass. There is no hepatosplenomegaly. There is tenderness in the left upper quadrant. There is no guarding and negative Bermudez's sign. Hernia confirmed negative in the ventral area.   Musculoskeletal: Normal range of motion. He exhibits no edema.   Lymphadenopathy:     He has no cervical adenopathy.   Neurological: He is alert and oriented to person, place, and time.   Skin: Skin is warm and dry. No rash noted. He is not diaphoretic. No erythema.   Psychiatric: He has a normal mood and affect. His behavior is normal.       ASSESSMENT  Diagnoses and all orders for this visit:    Personal history of colonic polyps  -     Case Request; Standing  -     Case Request    Left upper quadrant pain  -     Case Request; Standing  -     Case Request    Diarrhea, unspecified type  -     Clostridium Difficile Toxin, PCR - Stool, Per Rectum  -     Case Request; Standing  -     Case Request    Other orders  -     omeprazole (priLOSEC) 20 MG capsule; Take 1 capsule by mouth Daily.  -     Follow Anesthesia Guidelines / Protocol; Future  -     Obtain Informed Consent; Future  -     Obtain Informed Consent; Standing          PLAN  \Begina PPI daily and trial off NSAIDS , tylenol arthritis and re eval, once restriction on procedures lift would proceed to EGD Colonsocpy   Will try to improve his nausea with PPI and ovff NSAIDS and his diarrhe by adding a Probiotic and r/o C.Diff    No follow-ups on file.

## 2020-03-24 ENCOUNTER — PREP FOR SURGERY (OUTPATIENT)
Dept: OTHER | Facility: HOSPITAL | Age: 60
End: 2020-03-24

## 2020-03-24 DIAGNOSIS — Z86.010 PERSONAL HISTORY OF COLONIC POLYPS: Primary | ICD-10-CM

## 2020-04-17 ENCOUNTER — TELEPHONE (OUTPATIENT)
Dept: FAMILY MEDICINE CLINIC | Facility: CLINIC | Age: 60
End: 2020-04-17

## 2020-04-17 RX ORDER — AMOXICILLIN 500 MG/1
500 TABLET, FILM COATED ORAL 3 TIMES DAILY
Qty: 21 TABLET | Refills: 0 | Status: SHIPPED | OUTPATIENT
Start: 2020-04-17 | End: 2020-04-24

## 2020-04-17 NOTE — TELEPHONE ENCOUNTER
PATIENT CALLING, BELIEVES HE HAS BRONCHITIS AND WOULD LIKE AN ABX CALLED IN.    VERIFIED ROBY ON [address].    PATIENT CALL BACK [phone].    Ok  Amoxil sent in

## 2020-06-02 ENCOUNTER — LAB REQUISITION (OUTPATIENT)
Dept: LAB | Facility: HOSPITAL | Age: 60
End: 2020-06-02

## 2020-06-02 ENCOUNTER — TELEPHONE (OUTPATIENT)
Dept: GASTROENTEROLOGY | Facility: CLINIC | Age: 60
End: 2020-06-02

## 2020-06-02 DIAGNOSIS — Z00.00 ENCOUNTER FOR GENERAL ADULT MEDICAL EXAMINATION WITHOUT ABNORMAL FINDINGS: ICD-10-CM

## 2020-06-02 NOTE — TELEPHONE ENCOUNTER
PATIENT CALLED TO CANCEL HIS PROCEDURE ON 06/05/2020.  STILL AFRAID OF THE COVID-19, DOES NOT WANT TO GO TO HOSPITAL.  HE WILL CALL US BACK TO RESCHEDULE AT LATER TIME.

## 2020-06-12 DIAGNOSIS — M51.26 LUMBAR HERNIATED DISC: ICD-10-CM

## 2020-06-12 DIAGNOSIS — E78.2 MIXED HYPERLIPIDEMIA: ICD-10-CM

## 2020-06-15 RX ORDER — ATORVASTATIN CALCIUM 80 MG/1
TABLET, FILM COATED ORAL
Qty: 90 TABLET | Refills: 0 | Status: SHIPPED | OUTPATIENT
Start: 2020-06-15 | End: 2020-07-22 | Stop reason: SDUPTHER

## 2020-06-15 RX ORDER — DULOXETIN HYDROCHLORIDE 60 MG/1
CAPSULE, DELAYED RELEASE ORAL
Qty: 90 CAPSULE | Refills: 0 | Status: SHIPPED | OUTPATIENT
Start: 2020-06-15 | End: 2020-07-22 | Stop reason: SDUPTHER

## 2020-06-18 ENCOUNTER — TELEPHONE (OUTPATIENT)
Dept: GASTROENTEROLOGY | Facility: CLINIC | Age: 60
End: 2020-06-18

## 2020-06-22 ENCOUNTER — APPOINTMENT (OUTPATIENT)
Dept: SLEEP MEDICINE | Facility: HOSPITAL | Age: 60
End: 2020-06-22

## 2020-07-14 DIAGNOSIS — R53.83 FATIGUE, UNSPECIFIED TYPE: ICD-10-CM

## 2020-07-14 DIAGNOSIS — E78.2 MIXED HYPERLIPIDEMIA: Primary | ICD-10-CM

## 2020-07-14 DIAGNOSIS — E11.9 TYPE 2 DIABETES MELLITUS WITHOUT COMPLICATION, WITHOUT LONG-TERM CURRENT USE OF INSULIN (HCC): ICD-10-CM

## 2020-07-14 DIAGNOSIS — Z12.5 SCREENING FOR PROSTATE CANCER: Primary | ICD-10-CM

## 2020-07-16 LAB
ALT SERPL-CCNC: 24 IU/L (ref 0–44)
BUN SERPL-MCNC: 16 MG/DL (ref 6–24)
BUN/CREAT SERPL: 24 (ref 9–20)
CALCIUM SERPL-MCNC: 9.3 MG/DL (ref 8.7–10.2)
CHLORIDE SERPL-SCNC: 100 MMOL/L (ref 96–106)
CHOLEST SERPL-MCNC: 148 MG/DL (ref 100–199)
CO2 SERPL-SCNC: 28 MMOL/L (ref 20–29)
CREAT SERPL-MCNC: 0.68 MG/DL (ref 0.76–1.27)
ERYTHROCYTE [DISTWIDTH] IN BLOOD BY AUTOMATED COUNT: 13 % (ref 11.6–15.4)
GLUCOSE SERPL-MCNC: 177 MG/DL (ref 65–99)
HBA1C MFR BLD: 7.5 % (ref 4.8–5.6)
HCT VFR BLD AUTO: 39.1 % (ref 37.5–51)
HDLC SERPL-MCNC: 42 MG/DL
HGB BLD-MCNC: 13.4 G/DL (ref 13–17.7)
LDLC SERPL CALC-MCNC: 81 MG/DL (ref 0–99)
MCH RBC QN AUTO: 31.5 PG (ref 26.6–33)
MCHC RBC AUTO-ENTMCNC: 34.3 G/DL (ref 31.5–35.7)
MCV RBC AUTO: 92 FL (ref 79–97)
MICROALBUMIN UR-MCNC: 5.5 UG/ML
PLATELET # BLD AUTO: 336 X10E3/UL (ref 150–450)
POTASSIUM SERPL-SCNC: 4.6 MMOL/L (ref 3.5–5.2)
PSA SERPL-MCNC: 0.2 NG/ML (ref 0–4)
RBC # BLD AUTO: 4.26 X10E6/UL (ref 4.14–5.8)
SODIUM SERPL-SCNC: 140 MMOL/L (ref 134–144)
TRIGL SERPL-MCNC: 127 MG/DL (ref 0–149)
TSH SERPL DL<=0.005 MIU/L-ACNC: 1.4 UIU/ML (ref 0.45–4.5)
VLDLC SERPL CALC-MCNC: 25 MG/DL (ref 5–40)
WBC # BLD AUTO: 6.8 X10E3/UL (ref 3.4–10.8)

## 2020-07-22 ENCOUNTER — OFFICE VISIT (OUTPATIENT)
Dept: FAMILY MEDICINE CLINIC | Facility: CLINIC | Age: 60
End: 2020-07-22

## 2020-07-22 ENCOUNTER — RESULTS ENCOUNTER (OUTPATIENT)
Dept: FAMILY MEDICINE CLINIC | Facility: CLINIC | Age: 60
End: 2020-07-22

## 2020-07-22 VITALS
OXYGEN SATURATION: 99 % | SYSTOLIC BLOOD PRESSURE: 152 MMHG | WEIGHT: 251 LBS | HEART RATE: 83 BPM | DIASTOLIC BLOOD PRESSURE: 82 MMHG | BODY MASS INDEX: 38.04 KG/M2 | HEIGHT: 68 IN

## 2020-07-22 DIAGNOSIS — M51.26 LUMBAR HERNIATED DISC: ICD-10-CM

## 2020-07-22 DIAGNOSIS — E11.65 UNCONTROLLED TYPE 2 DIABETES MELLITUS WITH HYPERGLYCEMIA (HCC): ICD-10-CM

## 2020-07-22 DIAGNOSIS — E78.2 MIXED HYPERLIPIDEMIA: ICD-10-CM

## 2020-07-22 DIAGNOSIS — I10 ESSENTIAL HYPERTENSION: Primary | ICD-10-CM

## 2020-07-22 DIAGNOSIS — Z51.81 MEDICATION MONITORING ENCOUNTER: ICD-10-CM

## 2020-07-22 PROCEDURE — 99214 OFFICE O/P EST MOD 30 MIN: CPT | Performed by: FAMILY MEDICINE

## 2020-07-22 RX ORDER — DULOXETIN HYDROCHLORIDE 60 MG/1
60 CAPSULE, DELAYED RELEASE ORAL DAILY
Qty: 90 CAPSULE | Refills: 3 | Status: SHIPPED | OUTPATIENT
Start: 2020-07-22 | End: 2021-08-09 | Stop reason: SDUPTHER

## 2020-07-22 RX ORDER — ATORVASTATIN CALCIUM 80 MG/1
80 TABLET, FILM COATED ORAL DAILY
Qty: 90 TABLET | Refills: 3 | Status: SHIPPED | OUTPATIENT
Start: 2020-07-22 | End: 2021-08-09 | Stop reason: SDUPTHER

## 2020-07-23 ENCOUNTER — RESULTS ENCOUNTER (OUTPATIENT)
Dept: FAMILY MEDICINE CLINIC | Facility: CLINIC | Age: 60
End: 2020-07-23

## 2020-07-23 DIAGNOSIS — IMO0001 UNCONTROLLED DIABETES MELLITUS TYPE 2 WITHOUT COMPLICATIONS: ICD-10-CM

## 2020-07-27 ENCOUNTER — RESULTS ENCOUNTER (OUTPATIENT)
Dept: FAMILY MEDICINE CLINIC | Facility: CLINIC | Age: 60
End: 2020-07-27

## 2020-07-27 DIAGNOSIS — E11.65 UNCONTROLLED TYPE 2 DIABETES MELLITUS WITH HYPERGLYCEMIA (HCC): ICD-10-CM

## 2020-07-27 DIAGNOSIS — Z51.81 MEDICATION MONITORING ENCOUNTER: ICD-10-CM

## 2020-07-27 DIAGNOSIS — E78.2 MIXED HYPERLIPIDEMIA: ICD-10-CM

## 2020-07-27 DIAGNOSIS — I10 ESSENTIAL HYPERTENSION: ICD-10-CM

## 2020-08-31 ENCOUNTER — TELEPHONE (OUTPATIENT)
Dept: GASTROENTEROLOGY | Facility: CLINIC | Age: 60
End: 2020-08-31

## 2020-08-31 NOTE — TELEPHONE ENCOUNTER
PATIENT CALLED TO SCHEDULE HIS COLONOSCOPY THAT HE CANCEL WHEN THE COVID BREAK OUT.  SCHEDULED AT [facility] ON 11/11/2020 AT 8AM - ARRIVE 7AM.   E-MAIL INSTRUCTIONS [email or web address] TODAY.    EXPLAINED ABOUT THE COVID TEST ON 11/09/2020, THEN SELF QUARANTINE UNTIL AFTER PROCEDURE.  HE UNDERSTANDS.

## 2020-10-11 DIAGNOSIS — I10 ESSENTIAL HYPERTENSION: ICD-10-CM

## 2020-10-12 RX ORDER — AMLODIPINE BESYLATE AND BENAZEPRIL HYDROCHLORIDE 10; 40 MG/1; MG/1
CAPSULE ORAL
Qty: 90 CAPSULE | Refills: 1 | Status: SHIPPED | OUTPATIENT
Start: 2020-10-12 | End: 2021-04-05

## 2020-11-22 DIAGNOSIS — E11.9 TYPE 2 DIABETES MELLITUS WITHOUT COMPLICATION, WITHOUT LONG-TERM CURRENT USE OF INSULIN (HCC): ICD-10-CM

## 2020-11-23 RX ORDER — LIRAGLUTIDE 6 MG/ML
1.8 INJECTION SUBCUTANEOUS DAILY
Qty: 3 PEN | Refills: 5 | Status: SHIPPED | OUTPATIENT
Start: 2020-11-23 | End: 2021-05-10

## 2021-01-26 LAB
ALT SERPL-CCNC: 22 U/L (ref 1–41)
BUN SERPL-MCNC: 24 MG/DL (ref 8–23)
BUN/CREAT SERPL: 30.8 (ref 7–25)
CALCIUM SERPL-MCNC: 9.6 MG/DL (ref 8.6–10.5)
CHLORIDE SERPL-SCNC: 102 MMOL/L (ref 98–107)
CHOLEST SERPL-MCNC: 144 MG/DL (ref 0–200)
CO2 SERPL-SCNC: 30.2 MMOL/L (ref 22–29)
CREAT SERPL-MCNC: 0.78 MG/DL (ref 0.76–1.27)
ERYTHROCYTE [DISTWIDTH] IN BLOOD BY AUTOMATED COUNT: 12.9 % (ref 12.3–15.4)
GLUCOSE SERPL-MCNC: 197 MG/DL (ref 65–99)
HBA1C MFR BLD: 8.3 % (ref 4.8–5.6)
HCT VFR BLD AUTO: 38.9 % (ref 37.5–51)
HDLC SERPL-MCNC: 38 MG/DL (ref 40–60)
HGB BLD-MCNC: 13.5 G/DL (ref 13–17.7)
LDLC SERPL CALC-MCNC: 78 MG/DL (ref 0–100)
MCH RBC QN AUTO: 31 PG (ref 26.6–33)
MCHC RBC AUTO-ENTMCNC: 34.7 G/DL (ref 31.5–35.7)
MCV RBC AUTO: 89.4 FL (ref 79–97)
MICROALBUMIN UR-MCNC: 11.7 UG/ML
PLATELET # BLD AUTO: 340 10*3/MM3 (ref 140–450)
POTASSIUM SERPL-SCNC: 4.2 MMOL/L (ref 3.5–5.2)
RBC # BLD AUTO: 4.35 10*6/MM3 (ref 4.14–5.8)
SODIUM SERPL-SCNC: 141 MMOL/L (ref 136–145)
TRIGL SERPL-MCNC: 159 MG/DL (ref 0–150)
TSH SERPL DL<=0.005 MIU/L-ACNC: 1.3 UIU/ML (ref 0.27–4.2)
VLDLC SERPL CALC-MCNC: 28 MG/DL (ref 5–40)
WBC # BLD AUTO: 7.21 10*3/MM3 (ref 3.4–10.8)

## 2021-01-27 ENCOUNTER — TRANSCRIBE ORDERS (OUTPATIENT)
Dept: LAB | Facility: SURGERY CENTER | Age: 61
End: 2021-01-27

## 2021-01-27 DIAGNOSIS — Z01.818 OTHER SPECIFIED PRE-OPERATIVE EXAMINATION: Primary | ICD-10-CM

## 2021-02-01 ENCOUNTER — OFFICE VISIT (OUTPATIENT)
Dept: FAMILY MEDICINE CLINIC | Facility: CLINIC | Age: 61
End: 2021-02-01

## 2021-02-01 VITALS
WEIGHT: 255 LBS | HEART RATE: 92 BPM | DIASTOLIC BLOOD PRESSURE: 70 MMHG | HEIGHT: 68 IN | BODY MASS INDEX: 38.65 KG/M2 | SYSTOLIC BLOOD PRESSURE: 124 MMHG | OXYGEN SATURATION: 98 %

## 2021-02-01 DIAGNOSIS — E66.01 MORBIDLY OBESE (HCC): ICD-10-CM

## 2021-02-01 DIAGNOSIS — Z00.00 ROUTINE ADULT HEALTH MAINTENANCE: Primary | ICD-10-CM

## 2021-02-01 DIAGNOSIS — I10 ESSENTIAL HYPERTENSION: ICD-10-CM

## 2021-02-01 DIAGNOSIS — E11.65 TYPE 2 DIABETES MELLITUS WITH HYPERGLYCEMIA, WITHOUT LONG-TERM CURRENT USE OF INSULIN (HCC): ICD-10-CM

## 2021-02-01 DIAGNOSIS — E11.65 UNCONTROLLED TYPE 2 DIABETES MELLITUS WITH HYPERGLYCEMIA (HCC): ICD-10-CM

## 2021-02-01 DIAGNOSIS — E11.9 TYPE 2 DIABETES MELLITUS WITHOUT COMPLICATION, WITHOUT LONG-TERM CURRENT USE OF INSULIN (HCC): ICD-10-CM

## 2021-02-01 PROCEDURE — 99396 PREV VISIT EST AGE 40-64: CPT | Performed by: FAMILY MEDICINE

## 2021-02-01 RX ORDER — CARVEDILOL 25 MG/1
25 TABLET ORAL 2 TIMES DAILY WITH MEALS
Qty: 180 TABLET | Refills: 3 | Status: SHIPPED | OUTPATIENT
Start: 2021-02-01 | End: 2022-02-10 | Stop reason: SDUPTHER

## 2021-02-01 RX ORDER — GLIPIZIDE 10 MG/1
10 TABLET ORAL
Qty: 180 TABLET | Refills: 3 | Status: SHIPPED | OUTPATIENT
Start: 2021-02-01 | End: 2022-02-10 | Stop reason: SDUPTHER

## 2021-02-01 RX ORDER — SITAGLIPTIN 100 MG/1
100 TABLET, FILM COATED ORAL DAILY
Qty: 30 TABLET | Refills: 11 | Status: SHIPPED | OUTPATIENT
Start: 2021-02-01 | End: 2022-02-10

## 2021-02-01 RX ORDER — ZOSTER VACCINE RECOMBINANT, ADJUVANTED 50 MCG/0.5
50 KIT INTRAMUSCULAR
Qty: 1 EACH | Refills: 1 | Status: SHIPPED | OUTPATIENT
Start: 2021-02-01 | End: 2021-08-01

## 2021-02-01 NOTE — PROGRESS NOTES
"  Chief Complaint   Patient presents with   • Annual Exam       Subjective   Bam Negron is a 60 y.o. male and is here for a yearly physical exam. The patient reports no problems.    Do you take any herbs or supplements that were not prescribed by a doctor? yes. If so, these will be added to active medication list.    The following portions of the patient's history were reviewed and updated as appropriate: allergies, current medications, past family history, past medical history, past social history, past surgical history and problem list.    Social and Family and Surgical History reviewed and updated today, see Rooming tab.    Health History, Preventive Measures and Vaccination flow sheets reviewed and updated today.    Patient's current medical chart in Epic; including previous office notes, Mychart messages, recent phone calls, imaging, labs, specialist's evaluation either in notes or in Media tab reviewed today.    Other outside pertinent medical information also reviewed thru Care Everywhere function is also reviewed today.    Review of Systems  Review of Systems  A comprehensive review of systems was negative.    Vitals:    02/01/21 1426   BP: 124/70   BP Location: Left arm   Patient Position: Sitting   Cuff Size: Large Adult   Pulse: 92   SpO2: 98%   Weight: 116 kg (255 lb)   Height: 172.7 cm (67.99\")       General Appearance:  Alert, cooperative, no distress, appears stated age   Head:  Normocephalic, without obvious abnormality, atraumatic   Eyes:  PERRL, conjunctiva/corneas clear, EOM's intact.   Ears:  Normal TM's and external ear canals, both ears   Nose: Nares normal, septum midline, mucosa normal, no drainage or sinus tenderness   Throat: Lips, mucosa, and tongue normal; teeth and gums normal   Neck: Supple, symmetrical, trachea midline, no adenopathy;   thyroid: No enlargement/tenderness/nodules; no carotid  bruit   Back:  Symmetric, no curvature, ROM normal, no CVA tenderness   Lungs:  Clear to " auscultation bilaterally, respirations unlabored   Chest wall:  No tenderness or deformity   Heart:  Regular rate and rhythm, S1 and S2 normal, no murmur, rub or gallop   Abdomen:  Soft, non-tender, bowel sounds active all four quadrants,   no masses, no organomegaly   Rectal:        Extremities: Extremities normal, atraumatic, no cyanosis or edema   Pulses: 2+ and symmetric all extremities   Skin: Skin color, texture, turgor normal, no rashes or lesions   Lymph nodes: Cervical, supraclavicular, and axillary nodes normal   Neurologic: CNII-XII intact. Normal strength, sensation and reflexes   throughout          Results for orders placed or performed in visit on 07/27/20   ALT    Specimen: Blood   Result Value Ref Range    ALT (SGPT) 22 1 - 41 U/L   Basic Metabolic Panel    Specimen: Blood   Result Value Ref Range    Glucose 197 (H) 65 - 99 mg/dL    BUN 24 (H) 8 - 23 mg/dL    Creatinine 0.78 0.76 - 1.27 mg/dL    eGFR Non African Am 102 >60 mL/min/1.73    eGFR African Am 123 >60 mL/min/1.73    BUN/Creatinine Ratio 30.8 (H) 7.0 - 25.0    Sodium 141 136 - 145 mmol/L    Potassium 4.2 3.5 - 5.2 mmol/L    Chloride 102 98 - 107 mmol/L    Total CO2 30.2 (H) 22.0 - 29.0 mmol/L    Calcium 9.6 8.6 - 10.5 mg/dL   CBC (No Diff)    Specimen: Blood   Result Value Ref Range    WBC 7.21 3.40 - 10.80 10*3/mm3    RBC 4.35 4.14 - 5.80 10*6/mm3    Hemoglobin 13.5 13.0 - 17.7 g/dL    Hematocrit 38.9 37.5 - 51.0 %    MCV 89.4 79.0 - 97.0 fL    MCH 31.0 26.6 - 33.0 pg    MCHC 34.7 31.5 - 35.7 g/dL    RDW 12.9 12.3 - 15.4 %    Platelets 340 140 - 450 10*3/mm3   Lipid Panel    Specimen: Blood   Result Value Ref Range    Total Cholesterol 144 0 - 200 mg/dL    Triglycerides 159 (H) 0 - 150 mg/dL    HDL Cholesterol 38 (L) 40 - 60 mg/dL    VLDL Cholesterol Karri 28 5 - 40 mg/dL    LDL Chol Calc (NIH) 78 0 - 100 mg/dL   Hemoglobin A1c    Specimen: Blood   Result Value Ref Range    Hemoglobin A1C 8.30 (H) 4.80 - 5.60 %   TSH    Specimen: Blood    Result Value Ref Range    TSH 1.300 0.270 - 4.200 uIU/mL     Assessment/Plan   Healthy male exam.  Diagnoses and all orders for this visit:    1. Routine adult health maintenance (Primary)    2. Essential hypertension  -     carvedilol (COREG) 25 MG tablet; Take 1 tablet by mouth 2 (Two) Times a Day With Meals. Indications: High Blood Pressure Disorder  Dispense: 180 tablet; Refill: 3    3. Type 2 diabetes mellitus without complication, without long-term current use of insulin (CMS/[facility])  -     glipizide (GLUCOTROL) 10 MG tablet; Take 1 tablet by mouth 2 (Two) Times a Day Before Meals. Indications: Type 2 Diabetes  Dispense: 180 tablet; Refill: 3  -     metFORMIN (GLUCOPHAGE) 1000 MG tablet; Take 1 tablet by mouth 2 (Two) Times a Day With Meals. Indications: Type 2 Diabetes  Dispense: 180 tablet; Refill: 3    4. Type 2 diabetes mellitus with hyperglycemia, without long-term current use of insulin (CMS/[facility])  -     Januvia 100 MG tablet; Take 1 tablet by mouth Daily. Indications: Type 2 Diabetes  Dispense: 30 tablet; Refill: 11  -     Basic Metabolic Panel; Future  -     Lipid Panel; Future  -     Hemoglobin A1c; Future  -     Urinalysis With Microscopic If Indicated (No Culture) - Urine, Clean Catch; Future    5. Morbidly obese (CMS/[facility])    6. Uncontrolled type 2 diabetes mellitus with hyperglycemia (CMS/HCC)    Other orders  -     Zoster Vac Recomb Adjuvanted (Shingrix) 50 MCG/0.5ML reconstituted suspension; Inject 0.5 mL into the appropriate muscle as directed by prescriber Every 6 (Six) Months for 2 doses.  Dispense: 1 each; Refill: 1      1. Diabetes is still out of control  Eating too much, zero exercise. Intolerant of Farxiga = penile rash  Maxed out on 4 meds    2. Patient Counseling:  --Nutrition: Stressed importance of moderation in sodium/caffeine intake, saturated fat and cholesterol.  Discussed caloric balance, sufficient intake of fresh fruits, vegetables, fiber, calcium, iron.  --Exercise: Stressed the  importance of regular exercise.   --Substance Abuse: Discussed cessation/primary prevention of tobacco, alcohol, or other drug use; driving or other dangerous activities under the influence.    --Dental health: Discussed importance of regular tooth brushing, flossing, and dental visits.  --Suggested having eyes and vision checked if needed or past due.  --Immunizations reviewed and given if needed.  --Discussed benefits of screening colonoscopy and or ColoGuard. Will go this week.    3. Discussed the patient's BMI with him.  The BMI is above average; BMI management plan is completed  4. Follow up in 4 months    There are no Patient Instructions on file for this visit.    Medications Discontinued During This Encounter   Medication Reason   • carvedilol (COREG) 12.5 MG tablet *Therapy completed   • carvedilol (COREG) 25 MG tablet Reorder   • glipizide (GLUCOTROL) 10 MG tablet Reorder   • JANUVIA 100 MG tablet Reorder   • metFORMIN (GLUCOPHAGE) 1000 MG tablet Reorder        Dr. Lalito Hansen MD  [place], [place]  [facility]

## 2021-02-03 ENCOUNTER — LAB (OUTPATIENT)
Dept: LAB | Facility: SURGERY CENTER | Age: 61
End: 2021-02-03

## 2021-02-03 ENCOUNTER — PREP FOR SURGERY (OUTPATIENT)
Dept: OTHER | Facility: HOSPITAL | Age: 61
End: 2021-02-03

## 2021-02-03 DIAGNOSIS — Z86.010 PERSONAL HISTORY OF COLONIC POLYPS: ICD-10-CM

## 2021-02-03 DIAGNOSIS — Z12.11 ENCOUNTER FOR SCREENING FOR MALIGNANT NEOPLASM OF COLON: Primary | ICD-10-CM

## 2021-02-03 DIAGNOSIS — Z01.818 OTHER SPECIFIED PRE-OPERATIVE EXAMINATION: ICD-10-CM

## 2021-02-03 LAB — SARS-COV-2 ORF1AB RESP QL NAA+PROBE: NOT DETECTED

## 2021-02-03 PROCEDURE — C9803 HOPD COVID-19 SPEC COLLECT: HCPCS

## 2021-02-03 PROCEDURE — U0004 COV-19 TEST NON-CDC HGH THRU: HCPCS | Performed by: SURGERY

## 2021-02-04 NOTE — DISCHARGE INSTRUCTIONS
Education provided the Patient on the following:    - Nothing to Eat or Drink after MN the night before the procedure  -Your required COVID Test is Scheduled on          Between the Hours of [id number]  -You will only be notified if your COVID test Result is POSITIVE  -The importance of reducing your number of contacts by self quarantining after you COVID test until the date of your Colonoscopy    - Avoid red/purple fluids while completing their bowel prep as ordered by physician  -Contact Gastrointerologist office for any questions about specific details regarding colon prep    -You will need to have someone drive you home after your colonoscopy and remain with you for 24 hours after the procedure  - The date of your Surgery, your ride is welcome to either remain in our parking lot or within 10-15 minutes of [facility] [place]  -Please wear warm socks when you arrive for your colonoscopy  -Remove all jewelry and leave any valuables before arriving the day of your procedure (all will have to be removed before leaving preop)  -You will need to arrive at           on           for your colonoscopy    -Feel free to contact us at: [phone] with any additional questions/concerns          Has the patient ever tested Positive COVID?

## 2021-02-05 ENCOUNTER — ANESTHESIA (OUTPATIENT)
Dept: SURGERY | Facility: SURGERY CENTER | Age: 61
End: 2021-02-05

## 2021-02-05 ENCOUNTER — ANESTHESIA EVENT (OUTPATIENT)
Dept: SURGERY | Facility: SURGERY CENTER | Age: 61
End: 2021-02-05

## 2021-02-05 ENCOUNTER — HOSPITAL ENCOUNTER (OUTPATIENT)
Facility: SURGERY CENTER | Age: 61
Setting detail: HOSPITAL OUTPATIENT SURGERY
Discharge: HOME | End: 2021-02-05
Attending: INTERNAL MEDICINE | Admitting: INTERNAL MEDICINE

## 2021-02-05 VITALS
HEIGHT: 68 IN | OXYGEN SATURATION: 95 % | TEMPERATURE: 98 F | SYSTOLIC BLOOD PRESSURE: 157 MMHG | WEIGHT: 245 LBS | DIASTOLIC BLOOD PRESSURE: 90 MMHG | BODY MASS INDEX: 37.13 KG/M2 | RESPIRATION RATE: 20 BRPM | HEART RATE: 81 BPM

## 2021-02-05 DIAGNOSIS — Z12.11 ENCOUNTER FOR SCREENING FOR MALIGNANT NEOPLASM OF COLON: ICD-10-CM

## 2021-02-05 DIAGNOSIS — Z86.010 PERSONAL HISTORY OF COLONIC POLYPS: ICD-10-CM

## 2021-02-05 PROCEDURE — 25010000002 PROPOFOL 10 MG/ML EMULSION: Performed by: STUDENT IN AN ORGANIZED HEALTH CARE EDUCATION/TRAINING PROGRAM

## 2021-02-05 PROCEDURE — 45380 COLONOSCOPY AND BIOPSY: CPT | Performed by: INTERNAL MEDICINE

## 2021-02-05 PROCEDURE — 88305 TISSUE EXAM BY PATHOLOGIST: CPT | Performed by: INTERNAL MEDICINE

## 2021-02-05 RX ORDER — SODIUM CHLORIDE, SODIUM LACTATE, POTASSIUM CHLORIDE, CALCIUM CHLORIDE 600; 310; 30; 20 MG/100ML; MG/100ML; MG/100ML; MG/100ML
1000 INJECTION, SOLUTION INTRAVENOUS CONTINUOUS
Status: DISCONTINUED | OUTPATIENT
Start: 2021-02-05 | End: 2021-02-05 | Stop reason: HOSPADM

## 2021-02-05 RX ORDER — SODIUM CHLORIDE 0.9 % (FLUSH) 0.9 %
10 SYRINGE (ML) INJECTION AS NEEDED
Status: DISCONTINUED | OUTPATIENT
Start: 2021-02-05 | End: 2021-02-05 | Stop reason: HOSPADM

## 2021-02-05 RX ORDER — LIDOCAINE HYDROCHLORIDE 10 MG/ML
0.5 INJECTION, SOLUTION INFILTRATION; PERINEURAL ONCE AS NEEDED
Status: DISCONTINUED | OUTPATIENT
Start: 2021-02-05 | End: 2021-02-05 | Stop reason: HOSPADM

## 2021-02-05 RX ORDER — LIDOCAINE HYDROCHLORIDE 20 MG/ML
INJECTION, SOLUTION INFILTRATION; PERINEURAL AS NEEDED
Status: DISCONTINUED | OUTPATIENT
Start: 2021-02-05 | End: 2021-02-05 | Stop reason: SURG

## 2021-02-05 RX ORDER — PROPOFOL 10 MG/ML
VIAL (ML) INTRAVENOUS AS NEEDED
Status: DISCONTINUED | OUTPATIENT
Start: 2021-02-05 | End: 2021-02-05 | Stop reason: SURG

## 2021-02-05 RX ADMIN — PROPOFOL 50 MG: 10 INJECTION, EMULSION INTRAVENOUS at 13:09

## 2021-02-05 RX ADMIN — PROPOFOL INJECTABLE EMULSION 200 MCG/KG/MIN: 10 INJECTION, EMULSION INTRAVENOUS at 13:09

## 2021-02-05 RX ADMIN — SODIUM CHLORIDE, POTASSIUM CHLORIDE, SODIUM LACTATE AND CALCIUM CHLORIDE 1000 ML: 600; 310; 30; 20 INJECTION, SOLUTION INTRAVENOUS at 12:47

## 2021-02-05 RX ADMIN — LIDOCAINE HYDROCHLORIDE 30 MG: 20 INJECTION, SOLUTION INFILTRATION; PERINEURAL at 13:09

## 2021-02-05 NOTE — ANESTHESIA PREPROCEDURE EVALUATION
Anesthesia Evaluation     Patient summary reviewed and Nursing notes reviewed   no history of anesthetic complications:  NPO Solid Status: > 8 hours  NPO Liquid Status: > 2 hours           Airway   Mallampati: II  TM distance: >3 FB  Neck ROM: full  No difficulty expected  Dental - normal exam     Pulmonary     breath sounds clear to auscultation  (+) asthma,sleep apnea,   Cardiovascular   Exercise tolerance: good (4-7 METS)    Rhythm: regular  Rate: normal    (+) hypertension, CAD,       Neuro/Psych  GI/Hepatic/Renal/Endo    (+) obesity,   diabetes mellitus,     Musculoskeletal     Abdominal    Substance History      OB/GYN          Other                      Anesthesia Plan    ASA 3     MAC     intravenous induction     Anesthetic plan, all risks, benefits, and alternatives have been provided, discussed and informed consent has been obtained with: patient.

## 2021-02-05 NOTE — BRIEF OP NOTE
COLONOSCOPY FOR SCREENING  Progress Note    Bam Negron  2/5/2021    Pre-op Diagnosis:   Personal history of colonic polyps [Z86.010]       Post-Op Diagnosis Codes:     * Personal history of colonic polyps [Z86.010]     * Colon polyp [K63.5]     * Diverticulosis large intestine w/o perforation or abscess w/o bleeding [K57.30]    Procedure/CPT® Codes:        Procedure(s):  COLONOSCOPY FOR SCREENING    Surgeon(s):  Jayden Solis MD    Anesthesia: Monitored Anesthesia Care    Staff:   Endo Technician: Jonathan Lin  Endo Nurse: Isaiah Coto RN         Estimated Blood Loss: none    Urine Voided: * No values recorded between 2/5/2021  1:03 PM and 2/5/2021  1:39 PM *    Specimens:                Specimens     ID Source Type Tests Collected By Collected At Frozen?      A Large Intestine, Transverse Colon Tissue · TISSUE PATHOLOGY EXAM   Jayden Solis MD 2/5/21 1324 No     Description:  transverse colon polyp x3                Drains: * No LDAs found *    Findings: Colon to TI good Prep  Diverticulosis  Polyps-3-Biopsy    Complications: None          Jayden Solis MD     Date: 2/5/2021  Time: 13:38 EST

## 2021-02-05 NOTE — ANESTHESIA POSTPROCEDURE EVALUATION
Patient: Bam Negron    Procedure Summary     Date: 02/05/21 Room / Location: [facility] / [facility]    Anesthesia Start: 1303 Anesthesia Stop: 1341    Procedure: COLONOSCOPY FOR SCREENING (Left ) Diagnosis:       Personal history of colonic polyps      Colon polyp      Diverticulosis large intestine w/o perforation or abscess w/o bleeding      (Personal history of colonic polyps [Z86.010])    Surgeon: Jayden Solis MD Provider: Remberto Bentley MD    Anesthesia Type: MAC ASA Status: 3          Anesthesia Type: MAC    Vitals  Vitals Value Taken Time   /86 02/05/21 1342   Temp 36.7 °C (98 °F) 02/05/21 1342   Pulse 85 02/05/21 1342   Resp 16 02/05/21 1342   SpO2 97 % 02/05/21 1342           Post Anesthesia Care and Evaluation    Patient location during evaluation: bedside  Patient participation: complete - patient participated  Level of consciousness: awake and alert  Pain management: adequate  Airway patency: patent  Anesthetic complications: No anesthetic complications  PONV Status: controlled  Cardiovascular status: blood pressure returned to baseline and acceptable  Respiratory status: acceptable  Hydration status: acceptable

## 2021-02-07 NOTE — H&P
Patient Care Team:  Lalito Hansen MD as PCP - General (Family Medicine)  Smith Baron MD as Consulting Physician (Cardiology)  Roger Stone MD as Surgeon (Orthopedic Surgery)  Elaine Walter MD (Inactive) as Consulting Physician (Ophthalmology)  Jonna Osborn MD as Consulting Physician (Gastroenterology)  Adiel Thompson MD as Consulting Physician (Otolaryngology)  Mathew Bennett MD as Consulting Physician (Pulmonary Disease)    CHIEF COMPLAINT: Personal hx colon polyps    HISTORY OF PRESENT ILLNESS:  Last colonoscopy was 2016    Past Medical History:   Diagnosis Date   • Arthritis     OSTEO   • Blood type O+ 10/04/2016    By lab confirmation   • Colon polyp    • Coronary artery disease     Cardiac catheterization December 2011 severe single coronary artery disease of the left anterior descending completely occluded with good collateralization and also showed moderate left ventricular diastolic dysfunction   • Deviated septum    • Diastolic dysfunction     Demonstrated on cardiac cath    • Herniated lumbar intervertebral disc     THREE L 3, L4,L5   • History of broken collarbone     bilateral   • Left knee DJD    • Lumbar herniated disc     L3 & L5, h/o lumbar epidurals   • Myocardial infarction (CMS/HCC)     Anterior MI per electrocardiogram, Dr Baron follows   • Obstructive sleep apnea on CPAP 07/09/2013    Sleep Study completed by Dr. Bennett   • S/P cervical spinal fusion 2014    C 4-5,    • Sciatic pain     right leg   • Small bowel obstruction (CMS/HCC) 2013     Past Surgical History:   Procedure Laterality Date   • CARDIAC CATHETERIZATION  12/27/2011   • CERVICAL FUSION  12/31/2014    cervical fusion   • COLONOSCOPY  03/2010   • COLONOSCOPY N/A 9/23/2016    Procedure: COLONOSCOPY with polypectomy/tattooing;  Surgeon: Jonna Osborn MD;  Location: [facility];  Service:    • EPIDURAL      lumbar epidurals   • PILONIDAL CYSTECTOMY      x4   • NC TOTAL KNEE ARTHROPLASTY  "Right 10/17/2016    Procedure: TOTAL KNEE ARTHROPLASTY  KRIS--ANTIBIOTIC CEMENT ;  Surgeon: Roger Stone MD;  Location:  [facility];  Service: Orthopedics   • SEPTOPLASTY Bilateral 2018    Procedure: NASAL SEPTOPLASTY BILATERAL INFERIOR TURBINECTOMY;  Surgeon: Adiel Thompson MD;  Location:  [facility];  Service: ENT     Family History   Problem Relation Age of Onset   • Macular degeneration Mother            • Dementia Mother    • Stroke Mother    • Heart attack Father         age 85,  sudden at home   • Heart failure Father    • Macular degeneration Father    • Arthritis Sister    • Hypertension Brother    • Malig Hyperthermia Neg Hx    • Colon cancer Neg Hx    • Colon polyps Neg Hx      Social History     Tobacco Use   • Smoking status: Former Smoker     Packs/day: 3.00     Years: 20.00     Pack years: 60.00     Types: Cigarettes     Quit date:      Years since quittin.1   • Smokeless tobacco: Never Used   Substance Use Topics   • Alcohol use: Yes     Alcohol/week: 1.0 standard drinks     Types: 1 Cans of beer per week     Frequency: Monthly or less     Drinks per session: 1 or 2     Binge frequency: Never     Comment: SOCIAL DRINKER - rarely   • Drug use: No     No medications prior to admission.     Allergies:  Farxiga [dapagliflozin]    REVIEW OF SYSTEMS:  Please see the above history of present illness for pertinent positives and negatives.  The remainder of the patient's systems have been reviewed and are negative.     Vital Signs       Flowsheet Rows      First Filed Value   Admission Height  172.7 cm (68\") Documented at 2021 1241   Admission Weight  111 kg (245 lb) Documented at 2021 1241           Physical Exam:  Physical Exam   Constitutional: Patient appears well-developed and well-nourished and in no acute distress   HEENT:   Head: Normocephalic and atraumatic.   Eyes:  Pupils are equal, round, and reactive to light. EOM are intact. Sclerae are anicteric and " non-injected.  Mouth and Throat: Patient has moist mucous membranes. Oropharynx is clear of any erythema or exudate.     Neck: Neck supple. No JVD present. No thyromegaly present. No lymphadenopathy present.  Cardiovascular: Regular rate, regular rhythm, S1 normal and S2 normal.  Exam reveals no gallop and no friction rub.  No murmur heard.  Pulmonary/Chest: Lungs are clear to auscultation bilaterally. No respiratory distress. No wheezes. No rhonchi. No rales.   Abdominal: Soft. Bowel sounds are normal. No distension and no mass. There is no hepatosplenomegaly. There is no tenderness.   Musculoskeletal: Normal Muscle tone  Extremities: No edema. Pulses are palpable in all 4 extremities.  Neurological: Patient is alert and oriented to person, place, and time. Cranial nerves II-XII are grossly intact with no focal deficits.  Skin: Skin is warm. No rash noted. Nails show no clubbing.  No cyanosis or erythema.    Debilities/Disabilities Identified: None  Emotional Behavior: Appropriate     Results Review:    I reviewed the patient's new clinical results.  Lab Results (most recent)     Procedure Component Value Units Date/Time    Tissue Pathology Exam [[id number]] Collected: 02/05/21 1324    Specimen: Tissue from Large Intestine, Transverse Colon Updated: 02/05/21 1445          Imaging Results (Most Recent)     None        reviewed    ECG/EMG Results (most recent)     None        reviewed    Assessment/Plan   Personal hx colon polyps/  colonoscopy      I discussed the patients findings and my recommendations with patient.     Jayden Solis MD  02/06/21  19:06 EST    Time: 10 min prior to procedure.

## 2021-02-08 LAB
CYTO UR: NORMAL
LAB AP CASE REPORT: NORMAL
PATH REPORT.FINAL DX SPEC: NORMAL
PATH REPORT.GROSS SPEC: NORMAL

## 2021-03-22 RX ORDER — OMEPRAZOLE 20 MG/1
CAPSULE, DELAYED RELEASE ORAL
Qty: 90 CAPSULE | Refills: 2 | Status: SHIPPED | OUTPATIENT
Start: 2021-03-22 | End: 2021-12-13

## 2021-04-05 DIAGNOSIS — I10 ESSENTIAL HYPERTENSION: ICD-10-CM

## 2021-04-05 RX ORDER — AMLODIPINE BESYLATE AND BENAZEPRIL HYDROCHLORIDE 10; 40 MG/1; MG/1
CAPSULE ORAL
Qty: 90 CAPSULE | Refills: 0 | Status: SHIPPED | OUTPATIENT
Start: 2021-04-05 | End: 2021-07-06

## 2021-05-10 ENCOUNTER — OFFICE VISIT (OUTPATIENT)
Dept: FAMILY MEDICINE CLINIC | Facility: CLINIC | Age: 61
End: 2021-05-10

## 2021-05-10 VITALS
TEMPERATURE: 97.7 F | SYSTOLIC BLOOD PRESSURE: 140 MMHG | OXYGEN SATURATION: 99 % | BODY MASS INDEX: 38.04 KG/M2 | HEIGHT: 68 IN | WEIGHT: 251 LBS | DIASTOLIC BLOOD PRESSURE: 80 MMHG | HEART RATE: 80 BPM

## 2021-05-10 DIAGNOSIS — E11.9 TYPE 2 DIABETES MELLITUS WITHOUT COMPLICATION, WITHOUT LONG-TERM CURRENT USE OF INSULIN (HCC): Primary | ICD-10-CM

## 2021-05-10 DIAGNOSIS — E11.65 UNCONTROLLED TYPE 2 DIABETES MELLITUS WITH HYPERGLYCEMIA (HCC): ICD-10-CM

## 2021-05-10 DIAGNOSIS — J41.0 SIMPLE CHRONIC BRONCHITIS (HCC): ICD-10-CM

## 2021-05-10 DIAGNOSIS — M51.26 LUMBAR HERNIATED DISC: ICD-10-CM

## 2021-05-10 PROCEDURE — 99214 OFFICE O/P EST MOD 30 MIN: CPT | Performed by: FAMILY MEDICINE

## 2021-05-10 RX ORDER — METAXALONE 800 MG/1
800 TABLET ORAL DAILY
Qty: 90 TABLET | Refills: 3 | Status: SHIPPED | OUTPATIENT
Start: 2021-05-10 | End: 2022-10-11

## 2021-05-10 RX ORDER — ALBUTEROL SULFATE 90 UG/1
2 AEROSOL, METERED RESPIRATORY (INHALATION) EVERY 4 HOURS PRN
Qty: 18 G | Refills: 0 | Status: SHIPPED | OUTPATIENT
Start: 2021-05-10

## 2021-05-10 RX ORDER — INSULIN DETEMIR 100 [IU]/ML
20 INJECTION, SOLUTION SUBCUTANEOUS DAILY
Qty: 2 PEN | Refills: 11 | Status: SHIPPED | OUTPATIENT
Start: 2021-05-10 | End: 2021-08-09

## 2021-05-10 NOTE — PROGRESS NOTES
"  Chief Complaint   Patient presents with   • Diabetes   • Hyperlipidemia   • Hypertension       Subjective   Bam Negron is an 60 y.o. male who presents for follow up of diabetes. Current symptoms include: hyperglycemia. Patient denies foot ulcerations. Evaluation to date has included: hemoglobin A1C. Home sugars: BGs are high in the morning. Current treatments: more intensive attention to diet which has been not very effective, low cholesterol diet which has been not very effective, increased aerobic exercise which has been not very effective, Continued sulfonylurea which has been not very effective, Continued metformin which has been somewhat effective, Continued statin which has been somewhat effective, Continued ACE inhibitor/ARB which has been somewhat effective and Continued victoza and Januvia which has been somewhat effective. Discussed importance of yearly eye exams and checking feet for skin integrity.      The following portions of the patient's history were reviewed and updated as appropriate: allergies, current medications, past family history, past medical history, past social history, past surgical history and problem list.        Review of Systems  Pertinent items are noted in HPI.     Vitals:    05/10/21 1401   BP: 140/80   BP Location: Left arm   Patient Position: Sitting   Cuff Size: Adult   Pulse: 80   Temp: 97.7 °F (36.5 °C)   TempSrc: Temporal   SpO2: 99%   Weight: 114 kg (251 lb)   Height: 172.7 cm (68\")       Objective    Gen:  Alert, pleasant  Ears: canals clear, TMs normal  Throat: clear , no thrush, teeth ok  Neck: no bruit, no LAD  Lungs: clear  Heart: RR no murmur  Feet:  No rash, no skin breakdown, sensation grossly normal.    Laboratory:  Results for orders placed or performed in visit on 05/01/21   Basic Metabolic Panel    Specimen: Blood   Result Value Ref Range    Glucose 159 (H) 65 - 99 mg/dL    BUN 18 8 - 23 mg/dL    Creatinine 0.79 0.76 - 1.27 mg/dL    eGFR Non African Am " 100 >60 mL/min/1.73    eGFR African Am 121 >60 mL/min/1.73    BUN/Creatinine Ratio 22.8 7.0 - 25.0    Sodium 143 136 - 145 mmol/L    Potassium 4.0 3.5 - 5.2 mmol/L    Chloride 101 98 - 107 mmol/L    Total CO2 29.8 (H) 22.0 - 29.0 mmol/L    Calcium 9.7 8.6 - 10.5 mg/dL   Lipid Panel    Specimen: Blood   Result Value Ref Range    Total Cholesterol 150 0 - 200 mg/dL    Triglycerides 204 (H) 0 - 150 mg/dL    HDL Cholesterol 39 (L) 40 - 60 mg/dL    VLDL Cholesterol Karri 34 5 - 40 mg/dL    LDL Chol Calc (NIH) 77 0 - 100 mg/dL   Hemoglobin A1c    Specimen: Blood   Result Value Ref Range    Hemoglobin A1C 8.30 (H) 4.80 - 5.60 %   Urinalysis With Microscopic If Indicated (No Culture) - Urine, Clean Catch    Specimen: Urine, Clean Catch   Result Value Ref Range    Specific Gravity, UA 1.020 1.005 - 1.030    pH, UA 6.5 5.0 - 8.0    Color, UA Yellow     Appearance, UA Clear Clear    Leukocytes, UA Negative Negative    Protein Negative Negative    Glucose, UA Negative Negative    Ketones Trace (A) Negative    Blood, UA Negative Negative    Bilirubin, UA Negative Negative    Urobilinogen, UA Comment     Nitrite, UA Negative Negative        Assessment/Plan        Discussed general issues about diabetes pathophysiology and management.  Started insulin: Levemir insulin pen, 20 U a day. Stop Victoza.  Follow up in 3 months or as needed.    Diagnoses and all orders for this visit:    1. Type 2 diabetes mellitus without complication, without long-term current use of insulin (CMS/[facility]) (Primary)  -     insulin detemir (Levemir FlexTouch) 100 UNIT/ML injection; Inject 20 Units under the skin into the appropriate area as directed Daily for 30 days.  Dispense: 2 pen; Refill: 11  -     Insulin Pen Needle 31G X 5 MM misc; 1 Units Every Morning.  Dispense: 100 each; Refill: 4  -     Basic Metabolic Panel; Future  -     Lipid Panel; Future  -     ALT; Future  -     Hemoglobin A1c; Future    2. Simple chronic bronchitis (CMS/HCC)  -     albuterol  sulfate  (90 Base) MCG/ACT inhaler; Inhale 2 puffs Every 4 (Four) Hours As Needed for Wheezing.  Dispense: 18 g; Refill: 0    3. Lumbar herniated disc  -     metaxalone (Skelaxin) 800 MG tablet; Take 1 tablet by mouth Daily.  Dispense: 90 tablet; Refill: 3    4. Uncontrolled type 2 diabetes mellitus with hyperglycemia (CMS/[facility])      Diabetes is still poorly controlled  He is familiar with the pen device  Will start Levemir today.    Discussed healthy diabetic eating plan.  May refer to ADA web site, diabetes.org    Return in about 3 months (around 8/10/2021) for new medication follow up, diabetes.  There are no Patient Instructions on file for this visit.  Medications Discontinued During This Encounter   Medication Reason   • Victoza 18 MG/3ML solution pen-injector injection *Therapy completed   • albuterol sulfate  (90 Base) MCG/ACT inhaler Reorder   • metaxalone (SKELAXIN) 800 MG tablet Reorder   • Insulin Pen Needle 31G X 5 MM misc Reorder         Dr. Lalito Hansen MD  [place], [place]  [facility].

## 2021-05-25 ENCOUNTER — TELEPHONE (OUTPATIENT)
Dept: FAMILY MEDICINE CLINIC | Facility: CLINIC | Age: 61
End: 2021-05-25

## 2021-05-25 DIAGNOSIS — E11.9 TYPE 2 DIABETES MELLITUS WITHOUT COMPLICATION, WITHOUT LONG-TERM CURRENT USE OF INSULIN (HCC): ICD-10-CM

## 2021-05-25 RX ORDER — LIRAGLUTIDE 6 MG/ML
INJECTION SUBCUTANEOUS
Refills: 4 | OUTPATIENT
Start: 2021-05-25

## 2021-05-25 NOTE — TELEPHONE ENCOUNTER
PATIENT CALLING STATING HE WOULD LIKE TO WAIT ON TAKING THE INSULIN. HE STATED AFTER QUITTING HIS JOB HIS BLOOD SUGAR HAS DECREASED WITHIN 14DAY AVERAGE IT  AND FOR THE LAST 7 DAYS IT . PATIENT BELIEVES THE STRESS WITH THE JOB WAS PART OF THE LEVELS INCREASING.      GOOD CALL BACK   [phone]

## 2021-05-27 ENCOUNTER — TELEPHONE (OUTPATIENT)
Dept: FAMILY MEDICINE CLINIC | Facility: CLINIC | Age: 61
End: 2021-05-27

## 2021-05-27 DIAGNOSIS — E11.9 TYPE 2 DIABETES MELLITUS WITHOUT COMPLICATION, WITHOUT LONG-TERM CURRENT USE OF INSULIN: ICD-10-CM

## 2021-05-27 NOTE — TELEPHONE ENCOUNTER
Please advise .    Okay we can put Levemir insulin on hold.  Per your request.  And we can restart the previous Victoza you were on.  Refills were sent into your pharmacy

## 2021-05-27 NOTE — TELEPHONE ENCOUNTER
PATIENT IS REQUESTING MEDICATION VICTOZA  1.8. PATIENT IS MONITORING SUGAR. SUGAR WAS 57, 63 AND 67.     PLEASE ADVISE    CALL BACK  [phone]

## 2021-05-28 RX ORDER — LIRAGLUTIDE 6 MG/ML
1.8 INJECTION SUBCUTANEOUS DAILY
Qty: 3 PEN | Refills: 2 | Status: SHIPPED | OUTPATIENT
Start: 2021-05-28 | End: 2021-08-24

## 2021-07-06 DIAGNOSIS — I10 ESSENTIAL HYPERTENSION: ICD-10-CM

## 2021-07-06 RX ORDER — AMLODIPINE BESYLATE AND BENAZEPRIL HYDROCHLORIDE 10; 40 MG/1; MG/1
CAPSULE ORAL
Qty: 90 CAPSULE | Refills: 0 | Status: SHIPPED | OUTPATIENT
Start: 2021-07-06 | End: 2021-10-04

## 2021-07-19 DIAGNOSIS — E11.9 TYPE 2 DIABETES MELLITUS WITHOUT COMPLICATION, WITHOUT LONG-TERM CURRENT USE OF INSULIN (HCC): ICD-10-CM

## 2021-08-03 LAB
ALT SERPL-CCNC: 22 U/L (ref 1–41)
BUN SERPL-MCNC: 15 MG/DL (ref 8–23)
BUN/CREAT SERPL: 20.5 (ref 7–25)
CALCIUM SERPL-MCNC: 9.3 MG/DL (ref 8.6–10.5)
CHLORIDE SERPL-SCNC: 99 MMOL/L (ref 98–107)
CHOLEST SERPL-MCNC: 137 MG/DL (ref 0–200)
CO2 SERPL-SCNC: 29.2 MMOL/L (ref 22–29)
CREAT SERPL-MCNC: 0.73 MG/DL (ref 0.76–1.27)
GLUCOSE SERPL-MCNC: 147 MG/DL (ref 65–99)
HBA1C MFR BLD: 6.5 % (ref 4.8–5.6)
HDLC SERPL-MCNC: 41 MG/DL (ref 40–60)
LDLC SERPL CALC-MCNC: 77 MG/DL (ref 0–100)
POTASSIUM SERPL-SCNC: 4.5 MMOL/L (ref 3.5–5.2)
SODIUM SERPL-SCNC: 139 MMOL/L (ref 136–145)
TRIGL SERPL-MCNC: 100 MG/DL (ref 0–150)
VLDLC SERPL CALC-MCNC: 19 MG/DL (ref 5–40)

## 2021-08-09 ENCOUNTER — OFFICE VISIT (OUTPATIENT)
Dept: FAMILY MEDICINE CLINIC | Facility: CLINIC | Age: 61
End: 2021-08-09

## 2021-08-09 VITALS
BODY MASS INDEX: 36.53 KG/M2 | HEART RATE: 67 BPM | WEIGHT: 241 LBS | TEMPERATURE: 97.8 F | SYSTOLIC BLOOD PRESSURE: 120 MMHG | OXYGEN SATURATION: 98 % | DIASTOLIC BLOOD PRESSURE: 70 MMHG | HEIGHT: 68 IN

## 2021-08-09 DIAGNOSIS — Z12.5 SCREENING FOR PROSTATE CANCER: ICD-10-CM

## 2021-08-09 DIAGNOSIS — I10 ESSENTIAL HYPERTENSION: Primary | ICD-10-CM

## 2021-08-09 DIAGNOSIS — E78.2 MIXED HYPERLIPIDEMIA: ICD-10-CM

## 2021-08-09 DIAGNOSIS — M51.26 LUMBAR HERNIATED DISC: ICD-10-CM

## 2021-08-09 DIAGNOSIS — E11.9 TYPE 2 DIABETES MELLITUS WITHOUT COMPLICATION, WITHOUT LONG-TERM CURRENT USE OF INSULIN (HCC): ICD-10-CM

## 2021-08-09 PROCEDURE — 99214 OFFICE O/P EST MOD 30 MIN: CPT | Performed by: FAMILY MEDICINE

## 2021-08-09 RX ORDER — ATORVASTATIN CALCIUM 80 MG/1
80 TABLET, FILM COATED ORAL DAILY
Qty: 90 TABLET | Refills: 3 | Status: SHIPPED | OUTPATIENT
Start: 2021-08-09 | End: 2022-09-12

## 2021-08-09 RX ORDER — DULOXETIN HYDROCHLORIDE 60 MG/1
60 CAPSULE, DELAYED RELEASE ORAL DAILY
Qty: 90 CAPSULE | Refills: 3 | Status: SHIPPED | OUTPATIENT
Start: 2021-08-09 | End: 2022-09-20

## 2021-08-09 NOTE — PROGRESS NOTES
Chief Complaint   Patient presents with   • Diabetes   • Hypertension   • Hyperlipidemia   I came home and told my wife about the  insulin. We decided l should quit my job today. I was wondering if l could wait on taking the insulin for a couple weeks to see if l  can drop my numbers. I will message you with numbers. A couple days a week.  Thanks, Bam Negron      PATIENT CALLING STATING HE WOULD LIKE TO WAIT ON TAKING THE INSULIN. HE STATED AFTER QUITTING HIS JOB HIS BLOOD SUGAR HAS DECREASED WITHIN 14 DAY AVERAGE IT  AND FOR THE LAST 7 DAYS IT . PATIENT BELIEVES THE STRESS WITH THE JOB WAS PART OF THE LEVELS INCREASING.         Subjective   Bam Negron is an 60 y.o. male who presents for follow up of diabetes. Current symptoms include: none. Patient denies foot ulcerations and hyperglycemia. Evaluation to date has included: fasting blood sugar, fasting lipid panel, hemoglobin A1C and microalbuminuria. Home sugars: BGs consistently in an acceptable range. Current treatments: more intensive attention to diet which has been somewhat effective, low cholesterol diet which has been somewhat effective, increased aerobic exercise which has been somewhat effective, Continued sulfonylurea which has been somewhat effective, Continued metformin which has been somewhat effective, Continued statin which has been effective, Continued ACE inhibitor/ARB which has been effective and Continued Victoza and Januvia which has been somewhat effective. Discussed importance of yearly eye exams and checking feet for skin integrity.      The following portions of the patient's history were reviewed and updated as appropriate: allergies, current medications, past family history, past medical history, past social history, past surgical history and problem list.        Review of Systems  Pertinent items are noted in HPI.     Vitals:    08/09/21 1244   BP: 120/70   BP Location: Left arm   Patient Position: Sitting   Cuff  "Size: Adult   Pulse: 67   Temp: 97.8 °F (36.6 °C)   TempSrc: Temporal   SpO2: 98%   Weight: 109 kg (241 lb)   Height: 172.7 cm (68\")       Objective    Gen:  Alert, pleasant  Ears: canals clear, TMs normal  Throat: clear , no thrush, teeth ok  Neck: no bruit, no LAD  Lungs: clear  Heart: RR no murmur  Feet:  No rash, no skin breakdown, sensation grossly normal.    Laboratory:  Results for orders placed or performed in visit on 07/19/21   Hemoglobin A1c    Specimen: Blood   Result Value Ref Range    Hemoglobin A1C 6.50 (H) 4.80 - 5.60 %   ALT    Specimen: Blood   Result Value Ref Range    ALT (SGPT) 22 1 - 41 U/L   Lipid Panel    Specimen: Blood   Result Value Ref Range    Total Cholesterol 137 0 - 200 mg/dL    Triglycerides 100 0 - 150 mg/dL    HDL Cholesterol 41 40 - 60 mg/dL    VLDL Cholesterol Karri 19 5 - 40 mg/dL    LDL Chol Calc (NIH) 77 0 - 100 mg/dL   Basic Metabolic Panel    Specimen: Blood   Result Value Ref Range    Glucose 147 (H) 65 - 99 mg/dL    BUN 15 8 - 23 mg/dL    Creatinine 0.73 (L) 0.76 - 1.27 mg/dL    eGFR Non African Am 110 >60 mL/min/1.73    eGFR African Am 133 >60 mL/min/1.73    BUN/Creatinine Ratio 20.5 7.0 - 25.0    Sodium 139 136 - 145 mmol/L    Potassium 4.5 3.5 - 5.2 mmol/L    Chloride 99 98 - 107 mmol/L    Total CO2 29.2 (H) 22.0 - 29.0 mmol/L    Calcium 9.3 8.6 - 10.5 mg/dL        Assessment/Plan        Discussed general issues about diabetes pathophysiology and management.  Addressed ADA diet.  Suggested low cholesterol diet.  Encouraged aerobic exercise.  Continued sulfonylurea; see medication orders.   Continued metformin; see  medication orders.  Continued statin drug see medication orders.  Continued ACE inhibitor; see medication orders.  Follow up in 6 months or as needed.    Diagnoses and all orders for this visit:    1. Essential hypertension (Primary)    2. Mixed hyperlipidemia  -     atorvastatin (LIPITOR) 80 MG tablet; Take 1 tablet by mouth Daily.  Dispense: 90 tablet; " Refill: 3    3. Lumbar herniated disc  -     DULoxetine (CYMBALTA) 60 MG capsule; Take 1 capsule by mouth Daily.  Dispense: 90 capsule; Refill: 3    4. Type 2 diabetes mellitus without complication, without long-term current use of insulin (CMS/[facility])  -     Basic Metabolic Panel; Future  -     CBC (No Diff); Future  -     Lipid Panel; Future  -     Hemoglobin A1c; Future  -     TSH; Future  -     Urinalysis With Microscopic If Indicated (No Culture) - Urine, Clean Catch; Future    5. Screening for prostate cancer  -     PSA Screen; Future    diabetes is much better  Retired from work  Eating better  Nice weight loss  Did not start the insulin.      Discussed healthy diabetic eating plan.  May refer to ADA web site, diabetes.org    Return in about 6 months (around 2/9/2022) for blood pressure, Annual physical, diabetes.  There are no Patient Instructions on file for this visit.  Medications Discontinued During This Encounter   Medication Reason   • insulin detemir (Levemir FlexTouch) 100 UNIT/ML injection *Therapy completed   • atorvastatin (LIPITOR) 80 MG tablet Reorder   • DULoxetine (CYMBALTA) 60 MG capsule Reorder         Dr. Lalito Hansen MD  [place], [place]  [facility].

## 2021-08-11 RX ORDER — LANCETS 33 GAUGE
EACH MISCELLANEOUS
Qty: 300 EACH | Refills: 3 | Status: SHIPPED | OUTPATIENT
Start: 2021-08-11 | End: 2022-02-14

## 2021-08-11 NOTE — TELEPHONE ENCOUNTER
Caller: Donna Negron    Relationship: Emergency Contact    Best call back number: [phone]    Medication needed:   Requested Prescriptions     Pending Prescriptions Disp Refills   • glucose blood test strip 300 each 3     Sig: Use as instructed to test blood sugar 3 times daily.   • OneTouch Delica Lancets 33G misc 300 each 3     Sig: Use as directed to test blood sugar 3 times daily.       When do you need the refill by: TODAY    What additional details did the patient provide when requesting the medication: PATIENT COMPLETELY OUT OF TESTING SUPPLIES.    Does the patient have less than a 3 day supply:  [x] Yes  [] No    What [facility] the [facility] [address] [facility] - [phone] [facility] [phone]

## 2021-08-23 DIAGNOSIS — E11.9 TYPE 2 DIABETES MELLITUS WITHOUT COMPLICATION, WITHOUT LONG-TERM CURRENT USE OF INSULIN (HCC): ICD-10-CM

## 2021-08-24 RX ORDER — LIRAGLUTIDE 6 MG/ML
1.8 INJECTION SUBCUTANEOUS DAILY
Qty: 9 PEN | Refills: 3 | Status: SHIPPED | OUTPATIENT
Start: 2021-08-24 | End: 2021-12-16

## 2021-10-04 DIAGNOSIS — I10 ESSENTIAL HYPERTENSION: ICD-10-CM

## 2021-10-04 RX ORDER — AMLODIPINE BESYLATE AND BENAZEPRIL HYDROCHLORIDE 10; 40 MG/1; MG/1
CAPSULE ORAL
Qty: 90 CAPSULE | Refills: 1 | Status: SHIPPED | OUTPATIENT
Start: 2021-10-04 | End: 2022-04-11

## 2021-10-12 ENCOUNTER — TELEPHONE (OUTPATIENT)
Dept: FAMILY MEDICINE CLINIC | Facility: CLINIC | Age: 61
End: 2021-10-12

## 2021-10-12 NOTE — TELEPHONE ENCOUNTER
Patient's wife called regarding patient's last two appointments. She claims that there is a billing issue, and they were charged too much for these visits. She asked if you could call her back at [phone].

## 2021-10-18 NOTE — TELEPHONE ENCOUNTER
Spoke with patients wife and after I explained to her he was f/u on BP and DM2 and the medications . He can only have one cpe a year and his last one was 2/21 so he will be due 2/2022. All is good she is going to explain to her  . Insurance also told her the same thing .

## 2021-11-15 ENCOUNTER — TELEPHONE (OUTPATIENT)
Dept: FAMILY MEDICINE CLINIC | Facility: CLINIC | Age: 61
End: 2021-11-15

## 2021-11-15 DIAGNOSIS — S22.41XA CLOSED FRACTURE OF MULTIPLE RIBS OF RIGHT SIDE, INITIAL ENCOUNTER: Primary | ICD-10-CM

## 2021-11-15 RX ORDER — OXYCODONE HYDROCHLORIDE AND ACETAMINOPHEN 5; 325 MG/1; MG/1
1 TABLET ORAL EVERY 4 HOURS PRN
Qty: 18 TABLET | Refills: 0 | Status: SHIPPED | OUTPATIENT
Start: 2021-11-15 | End: 2021-12-28

## 2021-11-15 NOTE — TELEPHONE ENCOUNTER
Please advise .    Okay will send in a refill of Percocet.  Have him schedule appointment with me in 4 weeks.  For follow-up on his rib fractures.  I will place an order for chest and rib x-rays to be done here in our location a day or 2 before that visit.    Lalito Hansen MD

## 2021-11-15 NOTE — TELEPHONE ENCOUNTER
Caller: Bam Negron    Relationship: Self    Best call back number: [phone]     What medication are you requesting: PAIN MEDICATION     What are your current symptoms: 2 FRACTURED RIBS, 1 DISPLACED RIB    How long have you been experiencing symptoms: 11/08/21    Have you had these symptoms before:    [] Yes  [x] No    Have you been treated for these symptoms before:   [] Yes  [x] No    If a prescription is needed, what is your preferred pharmacy and phone number: [address] [facility] 53 - [phone]  - [phone]      Additional notes: PATIENT WAS SEEN AT AN [facility] AND WAS GIVEN THREE DAYS WORTH OF PAIN MEDICATION. THE PATIENT HAS TRIED TO USE THEM SPARINGLY, BUT ADMITS THAT HE TYPICALLY TAKES ONE AT NIGHT TO HELP HIM GET SOME PAIN RELIEF SO THAT HE CAN REST.

## 2021-12-01 ENCOUNTER — TELEPHONE (OUTPATIENT)
Dept: FAMILY MEDICINE CLINIC | Facility: CLINIC | Age: 61
End: 2021-12-01

## 2021-12-01 DIAGNOSIS — E11.9 TYPE 2 DIABETES MELLITUS WITHOUT COMPLICATION, WITHOUT LONG-TERM CURRENT USE OF INSULIN (HCC): Primary | ICD-10-CM

## 2021-12-01 NOTE — TELEPHONE ENCOUNTER
Hub to share :     Pt called needing a referral to Endo, I just need to know if he has ever been to one before and what he goes for?

## 2021-12-01 NOTE — TELEPHONE ENCOUNTER
Pt asked for referral to  for his diabetes. Please advise.     Okay endocrinology referral for diabetes has been made

## 2021-12-09 ENCOUNTER — HOSPITAL ENCOUNTER (OUTPATIENT)
Dept: GENERAL RADIOLOGY | Facility: HOSPITAL | Age: 61
Discharge: HOME | End: 2021-12-09
Admitting: FAMILY MEDICINE

## 2021-12-09 DIAGNOSIS — S22.41XA CLOSED FRACTURE OF MULTIPLE RIBS OF RIGHT SIDE, INITIAL ENCOUNTER: ICD-10-CM

## 2021-12-09 PROCEDURE — 71101 X-RAY EXAM UNILAT RIBS/CHEST: CPT

## 2021-12-13 RX ORDER — OMEPRAZOLE 20 MG/1
CAPSULE, DELAYED RELEASE ORAL
Qty: 90 CAPSULE | Refills: 2 | Status: SHIPPED | OUTPATIENT
Start: 2021-12-13 | End: 2022-02-14

## 2021-12-15 DIAGNOSIS — E11.9 TYPE 2 DIABETES MELLITUS WITHOUT COMPLICATION, WITHOUT LONG-TERM CURRENT USE OF INSULIN (HCC): ICD-10-CM

## 2021-12-16 RX ORDER — LIRAGLUTIDE 6 MG/ML
INJECTION SUBCUTANEOUS
Qty: 4 PEN | Refills: 11 | Status: SHIPPED | OUTPATIENT
Start: 2021-12-16 | End: 2023-01-08

## 2021-12-22 DIAGNOSIS — S22.41XA CLOSED FRACTURE OF MULTIPLE RIBS OF RIGHT SIDE, INITIAL ENCOUNTER: Primary | ICD-10-CM

## 2021-12-26 ENCOUNTER — HOSPITAL ENCOUNTER (OUTPATIENT)
Dept: GENERAL RADIOLOGY | Facility: HOSPITAL | Age: 61
Discharge: HOME | End: 2021-12-26
Admitting: PHYSICIAN ASSISTANT

## 2021-12-26 DIAGNOSIS — S22.41XA CLOSED FRACTURE OF MULTIPLE RIBS OF RIGHT SIDE, INITIAL ENCOUNTER: ICD-10-CM

## 2021-12-26 PROCEDURE — 71101 X-RAY EXAM UNILAT RIBS/CHEST: CPT

## 2021-12-28 ENCOUNTER — OFFICE VISIT (OUTPATIENT)
Dept: FAMILY MEDICINE CLINIC | Facility: CLINIC | Age: 61
End: 2021-12-28

## 2021-12-28 VITALS
DIASTOLIC BLOOD PRESSURE: 88 MMHG | HEART RATE: 83 BPM | TEMPERATURE: 97.3 F | BODY MASS INDEX: 36.83 KG/M2 | WEIGHT: 243 LBS | OXYGEN SATURATION: 98 % | SYSTOLIC BLOOD PRESSURE: 144 MMHG | HEIGHT: 68 IN

## 2021-12-28 DIAGNOSIS — S22.31XG CLOSED FRACTURE OF ONE RIB OF RIGHT SIDE WITH DELAYED HEALING, SUBSEQUENT ENCOUNTER: Primary | ICD-10-CM

## 2021-12-28 PROBLEM — S22.39XG: Status: ACTIVE | Noted: 2021-12-28

## 2021-12-28 PROCEDURE — 99212 OFFICE O/P EST SF 10 MIN: CPT | Performed by: FAMILY MEDICINE

## 2021-12-28 NOTE — PROGRESS NOTES
"  Subjective   Bam Negron is a 61 y.o. male who is here for   Chief Complaint   Patient presents with   • Fracture     follow up rib, still having pain at night    .     History of Present Illness   Bam is here in follow-up for his right rib fracture, #7.  X-rays last week reveal rib is still fractured.  He is only having minimal pain.  Taking Naprosyn at home  Able to take deep breaths.  Minimal pain with cough or sneeze.  Some pain when he rolls in bed  We reviewed his x-ray images    The following portions of the patient's history were reviewed and updated as appropriate: allergies, current medications, past family history, past medical history, past social history, past surgical history and problem list.    Review of Systems    Objective   Vitals:    12/28/21 1403   BP: 144/88   BP Location: Left arm   Patient Position: Sitting   Cuff Size: Adult   Pulse: 83   Temp: 97.3 °F (36.3 °C)   TempSrc: Temporal   SpO2: 98%   Weight: 110 kg (243 lb)   Height: 172.7 cm (68\")      Physical Exam  Cardiovascular:      Rate and Rhythm: Normal rate.   Pulmonary:      Effort: Pulmonary effort is normal.   Neurological:      Mental Status: He is alert.       XR Ribs Right With PA Chest (12/26/2021 12:48)    Assessment/Plan   Diagnoses and all orders for this visit:    1. Closed fracture of one rib of right side with delayed healing, subsequent encounter (Primary)    Having minimal symptoms due to the right rib fracture.  Advance activity as tolerated    There are no Patient Instructions on file for this visit.    Medications Discontinued During This Encounter   Medication Reason   • oxyCODONE-acetaminophen (Percocet) 5-325 MG per tablet *Therapy completed        No follow-ups on file.    Dr. Lalito Hansen  [facility] [place], [place]    "

## 2022-01-11 ENCOUNTER — PATIENT MESSAGE (OUTPATIENT)
Dept: FAMILY MEDICINE CLINIC | Facility: CLINIC | Age: 62
End: 2022-01-11

## 2022-01-11 RX ORDER — AMOXICILLIN 500 MG/1
500 TABLET, FILM COATED ORAL 3 TIMES DAILY
Qty: 21 TABLET | Refills: 0 | Status: SHIPPED | OUTPATIENT
Start: 2022-01-11 | End: 2022-01-18

## 2022-01-11 NOTE — TELEPHONE ENCOUNTER
From: Bam Negron  To: Lalito Hansen MD  Sent: 1/11/2022 2:23 PM EST  Subject: Prescription    , could you send me an antibiotic to the [facility] pharmacy in [place]. I am starting to get a bout of bronchitis. Thank you

## 2022-01-27 PROBLEM — Z86.0100 PERSONAL HISTORY OF COLONIC POLYPS: Status: RESOLVED | Noted: 2020-03-23 | Resolved: 2022-01-27

## 2022-01-27 PROBLEM — Z00.00 ROUTINE ADULT HEALTH MAINTENANCE: Status: RESOLVED | Noted: 2021-02-01 | Resolved: 2022-01-27

## 2022-01-27 PROBLEM — E11.65 UNCONTROLLED TYPE 2 DIABETES MELLITUS WITH HYPERGLYCEMIA: Status: RESOLVED | Noted: 2018-07-09 | Resolved: 2022-01-27

## 2022-01-27 PROBLEM — Z86.010 PERSONAL HISTORY OF COLONIC POLYPS: Status: RESOLVED | Noted: 2020-03-23 | Resolved: 2022-01-27

## 2022-01-27 PROBLEM — E66.01 MORBIDLY OBESE (HCC): Status: RESOLVED | Noted: 2021-02-01 | Resolved: 2022-01-27

## 2022-02-07 NOTE — PROGRESS NOTES
Bam NATHANIEL Negron, 61 y.o., Gender: male    Assessment/Plan    1.  Pt with DM Type 2 uncontrolled w/ vascular dz.  A1c 8.1% 2/22, 6.5% 8/21, 8.3% 5/21, 8.3% 1/21.  Counseled that conservative goal A1c is to be <7 % and aggressive 6.5 %.   Counseled pt that overall risk with diabetes is related to long term complications of both small and large blood vessels and that the risk for CAD, MI, and stroke is much higher than general population.  Counseled that the goal of tx is prevention of further complications.  Counseled on lifestyle change as a key part of this process to improve all parameters related to diabetes.  Pt counseled on how to do freq bg checks with fasting AM, before meals, before bed, occasionally 2 hours after a meal, and at 2-3 am if awake.  Pt is not checking enough as directed based on current treatment plan to check 0-2 times a day.  Found out pt still has a very old meter well over 10 yrs old.  Meter should be updated every other year, so sent for new testing kit.  Rec cont Metformin 1000 mg twice daily after AM and PM meals as that is optimal dose.  Rec cont Victoza 1.8 mg daily and note that should not be used in combination with Januvia.  As pt reported being on Victoza since around the time it was released and Januvia was added about a year ago, that should not have been done at that time but has since been stopped.  Pt reports has been on Glucotrol 10 mg twice daily for some time as well.  Pt reports tried SGLT-2 in the past but it caused groin yeast infection so had to be stopped.  Pt recently started on Actos at 45 mg, which is a dose that is no longer used and would never be the starting dose as is a medication to be used with caution in pt's w/ underlying vascular dz.  Will change to 15 mg daily which is the rec starting dose and counseled pt to watch for edema.  In this case, that will have pt all all available none insulin items and from there pt would like to avoid insulin if possible, so  he will try to work on basics w/ diet and exercise for next few mons to see if this will work to improve control.  If not, pt was told today basal insulin is what would be needed and he is agreeable to this if it is needed.  Will get labs to assess pancreatic reserve which will help determine further tx approach.  Will have f/u labs before pt returns.  Counseled must keep log and bring to apts to cont tx plan.  Will work to optimize treatment plan in coming months and get back to PCP.  2.  Hypertension at goal of <140/90.  Pt on tx for renal protection.  Last microalbumin/creatinine ratio urine check not known so will get with next labs.  Note the incorrect lab has been checked in the past, so will order the correct item to be done.  3.  Hyperlipidemia LDL goal <70 and trig goal <150.  Rec cont current tx as pt is on max dose of Lipitor will try adding Zetia to get to desired goal and if that does not work will need to change to Crestor.  Will have f/u labs.          Time Counseled: 25  Minutes  Total Time: 45  Minutes    Return to office in:   3-4   Months      Random Glucose: 179 mg/dL      HPI Summary    Pt is here for evaluation of DM reported to be Type 2.  Pt reports DM since '02.  Pt reports blood glucoses are not known without record.  Pt reports weight has been gradually up over the past 10 yrs since retiring.  Pt has no report of fatigue.  Pt has no complaint of general muscle weakness.  Pt as some increased thirst and urination.  Pt denies any chest pain follows w/ cardiology.  Pt denies any shortness of breath.  Pt denies any abdominal complaints.  Pt denies any early satiety or postprandial bloating.  Pt denies any blurry vision.  Pt reports last seen by eye doctor 2/21 no retinopathy.  Pt reports problems with feet w/ n/t while sleeping at times but not on regular basis.  Pt denies any skin wounds.  Pt has report of depression is on tx  Pt reports good sleep quality with known apnea symptoms uses cpap.   Pt notes he worked nights for many yrs and it has taken him some time to adjust back to normal wake / sleep pattern, which just happened in the past year though he retired about 10 yrs ago.  Pt reports trying to follow a diet to lose weight and no set exercise.  Pt reports having pneumonia vaccine in the past and did not get flu shot this last season.  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  Pt without any other complaints related to diabetes at this time.    Review of Systems  see HPI      Patient History    Past History (reviewed):    Medical:   Past Medical History:   Diagnosis Date   • Arthritis     OSTEO   • Blood type O+ 10/04/2016    By lab confirmation   • Cataract    • Colon polyp    • Coronary artery disease     Cardiac catheterization December 2011 severe single coronary artery disease of the left anterior descending completely occluded with good collateralization and also showed moderate left ventricular diastolic dysfunction   • Deviated septum    • Diastolic dysfunction     Demonstrated on cardiac cath    • Essential hypertension, benign    • Herniated lumbar intervertebral disc     THREE L 3, L4,L5   • History of broken collarbone     bilateral   • Left knee DJD    • Lumbar herniated disc     L3 & L5, h/o lumbar epidurals   • Mixed hyperlipidemia    • Myocardial infarction (HCC)     Anterior MI per electrocardiogram, Dr Baron follows   • Obesity    • Obstructive sleep apnea on CPAP 07/09/2013    Sleep Study completed by Dr. Bennett   • S/P cervical spinal fusion 2014    C 4-5,    • Sciatic pain     right leg   • Small bowel obstruction (HCC) 2013   • Type 2 diabetes mellitus with vascular disease (HCC) 2011   • Type II diabetes mellitus, uncontrolled (HCC) 2002       Surgery:   Past Surgical History:   Procedure Laterality Date   • CARDIAC CATHETERIZATION  12/27/2011   •  CERVICAL FUSION  12/31/2014    cervical fusion   • COLONOSCOPY  03/2010   • COLONOSCOPY N/A 9/23/2016    Procedure: COLONOSCOPY with polypectomy/tattooing;  Surgeon: Jonna Osborn MD;  Location:  [facility];  Service:    • COLONOSCOPY Left 2/5/2021    Procedure: COLONOSCOPY FOR SCREENING;  Surgeon: Jayden Solis MD;  Location: [facility] OR;  Service: Gastroenterology;  Laterality: Left;   • EPIDURAL      lumbar epidurals   • PILONIDAL CYSTECTOMY      x4   • IL TOTAL KNEE ARTHROPLASTY Right 10/17/2016    Procedure: TOTAL KNEE ARTHROPLASTY  KRIS--ANTIBIOTIC CEMENT ;  Surgeon: Roger Stone MD;  Location:  [facility];  Service: Orthopedics   • SEPTOPLASTY Bilateral 6/6/2018    Procedure: NASAL SEPTOPLASTY BILATERAL INFERIOR TURBINECTOMY;  Surgeon: Adiel Thompson MD;  Location:  [facility];  Service: ENT         Social History (reviewed):  Smoking 1-2 ppd 35-40 yrs quit '02, - ETOH, - Drugs, , Occupation retired      Medication List:  Current medications were reviewed.    Allergies:    Allergies   Allergen Reactions   • Farxiga [Dapagliflozin] Rash     Penile yeast infection       Physical Exam    VITALS:    Vitals:    02/14/22 1043   BP: 146/88   Pulse: 88   Temp: 97.8 °F (36.6 °C)   SpO2: 95%     Body mass index is 38.01 kg/m².    GENERAL:  Looks stated age, Sig obese  HEAD/EYES:  N/C, A/T, Mask in place  EXTREMITIES:  FROM  CNS:  A&Ox3    Full exam not done today.      Labs/Imaging  All available lab and imaging data were reviewed.        Juan Pablo Joaquin MD, FILIPPO, FACE, ECNU  2/14/2022  12:01 EST

## 2022-02-10 ENCOUNTER — OFFICE VISIT (OUTPATIENT)
Dept: FAMILY MEDICINE CLINIC | Facility: CLINIC | Age: 62
End: 2022-02-10

## 2022-02-10 VITALS
BODY MASS INDEX: 37.28 KG/M2 | HEART RATE: 80 BPM | DIASTOLIC BLOOD PRESSURE: 80 MMHG | OXYGEN SATURATION: 98 % | TEMPERATURE: 97.1 F | SYSTOLIC BLOOD PRESSURE: 148 MMHG | HEIGHT: 68 IN | WEIGHT: 246 LBS

## 2022-02-10 DIAGNOSIS — E11.65 UNCONTROLLED TYPE 2 DIABETES MELLITUS WITH HYPERGLYCEMIA: Primary | ICD-10-CM

## 2022-02-10 DIAGNOSIS — I10 ESSENTIAL HYPERTENSION: ICD-10-CM

## 2022-02-10 DIAGNOSIS — E11.59 TYPE 2 DIABETES MELLITUS WITH VASCULAR DISEASE: ICD-10-CM

## 2022-02-10 DIAGNOSIS — Z23 NEED FOR VACCINATION FOR PNEUMOCOCCUS: ICD-10-CM

## 2022-02-10 DIAGNOSIS — E11.9 TYPE 2 DIABETES MELLITUS WITHOUT COMPLICATION, WITHOUT LONG-TERM CURRENT USE OF INSULIN: ICD-10-CM

## 2022-02-10 PROCEDURE — 90471 IMMUNIZATION ADMIN: CPT | Performed by: FAMILY MEDICINE

## 2022-02-10 PROCEDURE — 90732 PPSV23 VACC 2 YRS+ SUBQ/IM: CPT | Performed by: FAMILY MEDICINE

## 2022-02-10 PROCEDURE — 99214 OFFICE O/P EST MOD 30 MIN: CPT | Performed by: FAMILY MEDICINE

## 2022-02-10 RX ORDER — GLIPIZIDE 10 MG/1
10 TABLET ORAL
Qty: 180 TABLET | Refills: 3 | Status: SHIPPED | OUTPATIENT
Start: 2022-02-10 | End: 2022-09-20

## 2022-02-10 RX ORDER — CARVEDILOL 25 MG/1
25 TABLET ORAL 2 TIMES DAILY WITH MEALS
Qty: 180 TABLET | Refills: 3 | Status: SHIPPED | OUTPATIENT
Start: 2022-02-10

## 2022-02-10 RX ORDER — PIOGLITAZONEHYDROCHLORIDE 45 MG/1
45 TABLET ORAL DAILY
Qty: 90 TABLET | Refills: 3 | Status: SHIPPED | OUTPATIENT
Start: 2022-02-10 | End: 2022-02-14 | Stop reason: DRUGHIGH

## 2022-02-10 NOTE — PROGRESS NOTES
"  Chief Complaint   Patient presents with   • Annual Exam   • Diabetes     no longer taking januvia due to cost        Subjective   Bam Negron is an 61 y.o. male who presents for follow up of diabetes. Current symptoms include: hyperglycemia. Patient denies foot ulcerations. Evaluation to date has included: hemoglobin A1C. Home sugars: BGs are high in the morning. Current treatments: more intensive attention to diet which has been ineffective, low cholesterol diet which has been ineffective, increased aerobic exercise which has been ineffective, patient refused insulin which has been unable to assess effectiveness, Continued sulfonylurea which has been somewhat effective, Continued metformin which has been somewhat effective, Continued statin which has been somewhat effective, Continued ACE inhibitor/ARB which has been somewhat effective and Discontinued Januvia which has been Cost. Discussed importance of yearly eye exams and checking feet for skin integrity.  States he stopped the Januvia this year.  A 30-day supply is $50.  He cannot afford it  Also not tolerating Farxiga due to rash.  We discussed Actos.  He admits to not following any type of healthy diet.  Is eating and drinking what he wants.  0 exercise has gained weight.  Reports being depressed        The following portions of the patient's history were reviewed and updated as appropriate: allergies, current medications, past family history, past medical history, past social history, past surgical history and problem list.        Review of Systems  Pertinent items are noted in HPI.     Vitals:    02/10/22 1044   BP: 148/80   BP Location: Left arm   Patient Position: Sitting   Cuff Size: Adult   Pulse: 80   Temp: 97.1 °F (36.2 °C)   TempSrc: Temporal   SpO2: 98%   Weight: 112 kg (246 lb)   Height: 172.7 cm (68\")       Objective    Gen:  Alert, pleasant  Ears: canals clear, TMs normal  Throat: clear , no thrush, teeth ok  Neck: no bruit, no LAD  Lungs: " clear  Heart: RR no murmur  Feet:  No rash, no skin breakdown, sensation grossly normal.    Laboratory:  Results for orders placed or performed in visit on 02/03/22   Basic Metabolic Panel    Specimen: Blood   Result Value Ref Range    Glucose 165 (H) 65 - 99 mg/dL    BUN 20 8 - 27 mg/dL    Creatinine 0.74 (L) 0.76 - 1.27 mg/dL    eGFR Non African Am 100 >59 mL/min/1.73    eGFR African Am 115 >59 mL/min/1.73    BUN/Creatinine Ratio 27 (H) 10 - 24    Sodium 144 134 - 144 mmol/L    Potassium 4.2 3.5 - 5.2 mmol/L    Chloride 102 96 - 106 mmol/L    Total CO2 25 20 - 29 mmol/L    Calcium 9.4 8.6 - 10.2 mg/dL   CBC (No Diff)    Specimen: Blood   Result Value Ref Range    WBC 7.7 3.4 - 10.8 x10E3/uL    RBC 4.71 4.14 - 5.80 x10E6/uL    Hemoglobin 14.0 13.0 - 17.7 g/dL    Hematocrit 41.4 37.5 - 51.0 %    MCV 88 79 - 97 fL    MCH 29.7 26.6 - 33.0 pg    MCHC 33.8 31.5 - 35.7 g/dL    RDW 13.2 11.6 - 15.4 %    Platelets 362 150 - 450 x10E3/uL   Lipid Panel    Specimen: Blood   Result Value Ref Range    Total Cholesterol 132 100 - 199 mg/dL    Triglycerides 123 0 - 149 mg/dL    HDL Cholesterol 44 >39 mg/dL    VLDL Cholesterol Karri 22 5 - 40 mg/dL    LDL Chol Calc (NIH) 66 0 - 99 mg/dL   PSA Screen    Specimen: Blood   Result Value Ref Range    PSA 0.2 0.0 - 4.0 ng/mL   Hemoglobin A1c    Specimen: Blood   Result Value Ref Range    Hemoglobin A1C 8.1 (H) 4.8 - 5.6 %   TSH    Specimen: Blood   Result Value Ref Range    TSH 1.560 0.450 - 4.500 uIU/mL   Urinalysis With Microscopic If Indicated (No Culture) - Urine, Clean Catch    Specimen: Urine, Clean Catch   Result Value Ref Range    Specific Gravity, UA 1.021 1.005 - 1.030    pH, UA 6.5 5.0 - 7.5    Color, UA Yellow Yellow    Appearance, UA Clear Clear    Leukocytes, UA Negative Negative    Protein Negative Negative/Trace    Glucose, UA 3+ (A) Negative    Ketones Negative Negative    Blood, UA Negative Negative    Bilirubin, UA Negative Negative    Urobilinogen, UA 0.2 0.2 - 1.0  mg/dL    Nitrite, UA Negative Negative    Microscopic Examination Comment         Assessment/Plan        Discussed general issues about diabetes pathophysiology and management.  Addressed ADA diet.  Suggested low cholesterol diet.  Encouraged aerobic exercise.  Discussed foot care.  Reminded to get yearly retinal exam.  Started Actos; see medication orders.  Follow up in 4 months or as needed.    Diagnoses and all orders for this visit:    1. Uncontrolled type 2 diabetes mellitus with hyperglycemia (HCC) (Primary)  -     Basic Metabolic Panel; Future  -     Lipid Panel; Future  -     Hemoglobin A1c; Future  -     Urinalysis With Microscopic If Indicated (No Culture) - Urine, Clean Catch; Future    2. Type 2 diabetes mellitus with vascular disease (HCC)  -     pioglitazone (Actos) 45 MG tablet; Take 1 tablet by mouth Daily.  Dispense: 90 tablet; Refill: 3    3. Need for vaccination for pneumococcus  -     Pneumococcal Polysaccharide Vaccine 23-Valent (PPSV23) Greater Than or Equal To 1yo Subcutaneous / IM    4. Essential hypertension  -     carvedilol (COREG) 25 MG tablet; Take 1 tablet by mouth 2 (Two) Times a Day With Meals. Indications: High Blood Pressure Disorder  Dispense: 180 tablet; Refill: 3    5. Type 2 diabetes mellitus without complication, without long-term current use of insulin (HCC)  -     glipizide (GLUCOTROL) 10 MG tablet; Take 1 tablet by mouth 2 (Two) Times a Day Before Meals. Indications: Type 2 Diabetes  Dispense: 180 tablet; Refill: 3  -     metFORMIN (GLUCOPHAGE) 1000 MG tablet; Take 1 tablet by mouth 2 (Two) Times a Day With Meals. Indications: Type 2 Diabetes  Dispense: 180 tablet; Refill: 3        Discussed healthy diabetic eating plan.  May refer to ADA web site, diabetes.org    Return in about 4 months (around 6/10/2022) for blood pressure, new medication follow up, diabetes.  There are no Patient Instructions on file for this visit.  Medications Discontinued During This Encounter    Medication Reason   • Januvia 100 MG tablet Cost of medication   • carvedilol (COREG) 25 MG tablet Reorder   • glipizide (GLUCOTROL) 10 MG tablet Reorder   • metFORMIN (GLUCOPHAGE) 1000 MG tablet Reorder         Dr. Lalito Hansen MD  [place], [place]  [facility].

## 2022-02-14 ENCOUNTER — OFFICE VISIT (OUTPATIENT)
Dept: ENDOCRINOLOGY | Age: 62
End: 2022-02-14

## 2022-02-14 VITALS
SYSTOLIC BLOOD PRESSURE: 146 MMHG | OXYGEN SATURATION: 95 % | HEART RATE: 88 BPM | BODY MASS INDEX: 37.89 KG/M2 | DIASTOLIC BLOOD PRESSURE: 88 MMHG | TEMPERATURE: 97.8 F | WEIGHT: 250 LBS | HEIGHT: 68 IN

## 2022-02-14 DIAGNOSIS — E11.59 TYPE 2 DIABETES MELLITUS WITH VASCULAR DISEASE: ICD-10-CM

## 2022-02-14 DIAGNOSIS — E78.2 MIXED HYPERLIPIDEMIA: ICD-10-CM

## 2022-02-14 DIAGNOSIS — I10 ESSENTIAL HYPERTENSION, BENIGN: ICD-10-CM

## 2022-02-14 DIAGNOSIS — E11.65 UNCONTROLLED TYPE 2 DIABETES MELLITUS WITH HYPERGLYCEMIA: Primary | ICD-10-CM

## 2022-02-14 PROCEDURE — 99204 OFFICE O/P NEW MOD 45 MIN: CPT | Performed by: INTERNAL MEDICINE

## 2022-02-14 RX ORDER — LANCETS 30 GAUGE
1 EACH MISCELLANEOUS DAILY
Qty: 100 EACH | Refills: 4 | Status: SHIPPED | OUTPATIENT
Start: 2022-02-14

## 2022-02-14 RX ORDER — BLOOD SUGAR DIAGNOSTIC
STRIP MISCELLANEOUS
Qty: 100 EACH | Refills: 4 | Status: SHIPPED | OUTPATIENT
Start: 2022-02-14

## 2022-02-14 RX ORDER — PEN NEEDLE, DIABETIC 32 GX 1/4"
NEEDLE, DISPOSABLE MISCELLANEOUS
Qty: 100 EACH | Refills: 4 | Status: SHIPPED | OUTPATIENT
Start: 2022-02-14

## 2022-02-14 RX ORDER — EZETIMIBE 10 MG/1
TABLET ORAL
Qty: 90 TABLET | Refills: 2 | Status: SHIPPED | OUTPATIENT
Start: 2022-02-14 | End: 2022-10-17

## 2022-02-14 RX ORDER — PIOGLITAZONEHYDROCHLORIDE 15 MG/1
TABLET ORAL
Qty: 90 TABLET | Refills: 2 | Status: SHIPPED | OUTPATIENT
Start: 2022-02-14 | End: 2022-10-17 | Stop reason: SDUPTHER

## 2022-02-14 RX ORDER — BLOOD-GLUCOSE METER
1 EACH MISCELLANEOUS ONCE
Qty: 1 KIT | Refills: 0 | Status: SHIPPED | OUTPATIENT
Start: 2022-02-14 | End: 2022-02-14

## 2022-02-14 NOTE — PATIENT INSTRUCTIONS
As discussed you will continue Metformin, Glipizide, and Victoza as before. Actos will be lowered to 15 mg daily, you can finish any 45 mg dose you have by taking 1/2 daily.  Replaced your meter as it was very old.      As discussed all that remains is insulin to improve control. If you want to avoid that best to work on diet, exercise, and weight loss to see if control can be improved. Will make determination on this at next visit with labs prior at the [facility] near where you live.    Labs to be done at least 10 days before return appointment.  If labs are not done within 3 days of scheduled return appointment the appointment will be canceled and rescheduled to a later date with requirement for labs to be done as directed.      Bring med list, meter and/or log data to all appointments.

## 2022-03-22 ENCOUNTER — TELEPHONE (OUTPATIENT)
Dept: ENDOCRINOLOGY | Age: 62
End: 2022-03-22

## 2022-03-23 RX ORDER — BLOOD-GLUCOSE METER
1 EACH MISCELLANEOUS DAILY
Qty: 1 KIT | Refills: 0 | Status: SHIPPED | OUTPATIENT
Start: 2022-03-23

## 2022-03-28 ENCOUNTER — TELEPHONE (OUTPATIENT)
Dept: ENDOCRINOLOGY | Age: 62
End: 2022-03-28

## 2022-03-28 NOTE — TELEPHONE ENCOUNTER
PT CALLED REQUESTING REFILL ON TEST STRIPS     PLEASE SEND TO [facility]

## 2022-03-29 ENCOUNTER — TELEPHONE (OUTPATIENT)
Dept: ENDOCRINOLOGY | Age: 62
End: 2022-03-29

## 2022-04-10 DIAGNOSIS — I10 ESSENTIAL HYPERTENSION: ICD-10-CM

## 2022-04-11 RX ORDER — AMLODIPINE BESYLATE AND BENAZEPRIL HYDROCHLORIDE 10; 40 MG/1; MG/1
CAPSULE ORAL
Qty: 90 CAPSULE | Refills: 0 | Status: SHIPPED | OUTPATIENT
Start: 2022-04-11 | End: 2022-06-13 | Stop reason: SDUPTHER

## 2022-05-06 DIAGNOSIS — J40 BRONCHITIS: Primary | ICD-10-CM

## 2022-05-06 RX ORDER — AZITHROMYCIN 250 MG/1
TABLET, FILM COATED ORAL
Qty: 6 TABLET | Refills: 0 | Status: SHIPPED | OUTPATIENT
Start: 2022-05-06 | End: 2022-06-13

## 2022-06-01 ENCOUNTER — LAB (OUTPATIENT)
Dept: ENDOCRINOLOGY | Age: 62
End: 2022-06-01

## 2022-06-01 DIAGNOSIS — I10 ESSENTIAL HYPERTENSION, BENIGN: ICD-10-CM

## 2022-06-01 DIAGNOSIS — E11.65 UNCONTROLLED TYPE 2 DIABETES MELLITUS WITH HYPERGLYCEMIA: ICD-10-CM

## 2022-06-01 DIAGNOSIS — E78.2 MIXED HYPERLIPIDEMIA: ICD-10-CM

## 2022-06-01 DIAGNOSIS — E11.59 TYPE 2 DIABETES MELLITUS WITH VASCULAR DISEASE: ICD-10-CM

## 2022-06-01 DIAGNOSIS — E78.2 MIXED HYPERLIPIDEMIA: Primary | ICD-10-CM

## 2022-06-02 LAB
ALBUMIN SERPL-MCNC: 4.3 G/DL (ref 3.8–4.8)
ALBUMIN/CREAT UR: 13 MG/G CREAT (ref 0–29)
ALBUMIN/GLOB SERPL: 1.6 {RATIO} (ref 1.2–2.2)
ALP SERPL-CCNC: 77 IU/L (ref 44–121)
ALT SERPL-CCNC: 26 IU/L (ref 0–44)
AST SERPL-CCNC: 25 IU/L (ref 0–40)
BILIRUB SERPL-MCNC: 0.3 MG/DL (ref 0–1.2)
BUN SERPL-MCNC: 17 MG/DL (ref 8–27)
BUN/CREAT SERPL: 22 (ref 10–24)
C PEPTIDE SERPL-MCNC: 3.2 NG/ML (ref 1.1–4.4)
CALCIUM SERPL-MCNC: 9.3 MG/DL (ref 8.6–10.2)
CHLORIDE SERPL-SCNC: 100 MMOL/L (ref 96–106)
CHOLEST SERPL-MCNC: 118 MG/DL (ref 100–199)
CO2 SERPL-SCNC: 26 MMOL/L (ref 20–29)
CREAT SERPL-MCNC: 0.76 MG/DL (ref 0.76–1.27)
CREAT UR-MCNC: 30.6 MG/DL
EGFRCR SERPLBLD CKD-EPI 2021: 102 ML/MIN/1.73
GLOBULIN SER CALC-MCNC: 2.7 G/DL (ref 1.5–4.5)
GLUCOSE SERPL-MCNC: 144 MG/DL (ref 65–99)
HBA1C MFR BLD: 7.6 % (ref 4.8–5.6)
HDLC SERPL-MCNC: 47 MG/DL
IMP & REVIEW OF LAB RESULTS: NORMAL
INSULIN SERPL-ACNC: 10 UIU/ML (ref 2.6–24.9)
LDLC SERPL CALC-MCNC: 57 MG/DL (ref 0–99)
MICROALBUMIN UR-MCNC: 4.1 UG/ML
POTASSIUM SERPL-SCNC: 4.1 MMOL/L (ref 3.5–5.2)
PROT SERPL-MCNC: 7 G/DL (ref 6–8.5)
SODIUM SERPL-SCNC: 141 MMOL/L (ref 134–144)
TRIGL SERPL-MCNC: 67 MG/DL (ref 0–149)
VLDLC SERPL CALC-MCNC: 14 MG/DL (ref 5–40)

## 2022-06-13 ENCOUNTER — OFFICE VISIT (OUTPATIENT)
Dept: FAMILY MEDICINE CLINIC | Facility: CLINIC | Age: 62
End: 2022-06-13

## 2022-06-13 VITALS
HEART RATE: 78 BPM | BODY MASS INDEX: 38.34 KG/M2 | OXYGEN SATURATION: 97 % | WEIGHT: 253 LBS | HEIGHT: 68 IN | TEMPERATURE: 97.3 F | SYSTOLIC BLOOD PRESSURE: 140 MMHG | DIASTOLIC BLOOD PRESSURE: 82 MMHG

## 2022-06-13 DIAGNOSIS — I10 ESSENTIAL HYPERTENSION: ICD-10-CM

## 2022-06-13 PROBLEM — S22.39XG: Status: RESOLVED | Noted: 2021-12-28 | Resolved: 2022-06-13

## 2022-06-13 PROCEDURE — 99213 OFFICE O/P EST LOW 20 MIN: CPT | Performed by: FAMILY MEDICINE

## 2022-06-13 RX ORDER — AMLODIPINE BESYLATE AND BENAZEPRIL HYDROCHLORIDE 10; 40 MG/1; MG/1
1 CAPSULE ORAL DAILY
Qty: 90 CAPSULE | Refills: 3 | Status: SHIPPED | OUTPATIENT
Start: 2022-06-13

## 2022-06-13 NOTE — PROGRESS NOTES
Chief Complaint   Patient presents with   • Diabetes   • Hypertension   • Hyperlipidemia   • Back Pain     Right side     Answers for HPI/ROS submitted by the patient on 6/6/2022  What is the primary reason for your visit?: Diabetes  Diabetes type: type 2  MedicAlert ID: No  Disease duration: 22 years  blurred vision: No  chest pain: No  fatigue: Yes  foot paresthesias: No  foot ulcerations: No  polydipsia: No  polyphagia: No  polyuria: No  visual change: No  weakness: Yes  weight loss: No  Symptom course: stable  confusion: No  dizziness: No  headaches: No  hunger: No  mood changes: No  nervous/anxious: Yes  pallor: No  seizures: No  sleepiness: Yes  speech difficulty: No  sweats: Yes  tremors: No  blackouts: No  hospitalization: No  nocturnal hypoglycemia: Yes  required assistance: No  required glucagon: No  CVA: No  heart disease: Yes  impotence: Yes  nephropathy: No  peripheral neuropathy: No  PVD: No  retinopathy: No  CAD risks: family history, hypertension, obesity, sedentary lifestyle  Current treatments: oral agent (triple therapy)  Treatment compliance: all of the time  Home blood tests: 1-2 x per day  Monitoring compliance: adequate  Blood glucose trend: fluctuating dramatically  breakfast time: after 10 am  lunch time: after 3 pm  dinner time: after 8 pm  High score: 140-180  Overall: 130-140  Weight trend: fluctuating minimally  Current diet: generally healthy, low salt  Meal planning: avoidance of concentrated sweets, carbohydrate counting  Exercise: intermittently  Dietitian visit: No  Eye exam current: Yes  Sees podiatrist: No      Subjective     Patient here for follow-up of elevated blood pressure.    He is not exercising and is not adherent to a low-salt diet.    Blood pressure is not well controlled at home.   Cardiac symptoms: dyspnea, fatigue and weight gain.   Patient denies: chest pain.   Cardiovascular risk factors: advanced age (older than 55 for men, 65 for women), diabetes mellitus,  dyslipidemia, family history of premature cardiovascular disease, hypertension, male gender, obesity (BMI >= 30 kg/m2) and sedentary lifestyle.   Use of agents associated with hypertension: NSAIDS.   History of target organ damage: prior coronary revascularization.  Patient is taking prescribed hypertension medications as prescribed without side effects.  Overall feeling well.  Enjoying skilled nursing.  He has wife's sold her home here in [place].  Will be moving in with their daughter and grand kids in [facility] later on this summer.  He is now seeing [facility] to help manage his diabetes.  Medications have been modified.  Is still not consistent with his diet.  He is thinking about joining the [employer] once he moves over to [place].  His aches and pains joint pain is about the same.  Still take an over-the-counter Aleve.    The following portions of the patient's history were reviewed and updated as appropriate: allergies, current medications, past family history, past medical history, past social history, past surgical history and problem list.    Review of Systems  Pertinent items are noted in HPI.    Results for orders placed or performed in visit on 06/01/22   Hemoglobin A1c    Specimen: Blood   Result Value Ref Range    Hemoglobin A1C 7.6 (H) 4.8 - 5.6 %   C-Peptide    Specimen: Blood   Result Value Ref Range    C-Peptide 3.2 1.1 - 4.4 ng/mL   Insulin, Total    Specimen: Blood   Result Value Ref Range    Insulin 10.0 2.6 - 24.9 uIU/mL   Comprehensive Metabolic Panel    Specimen: Blood   Result Value Ref Range    Glucose 144 (H) 65 - 99 mg/dL    BUN 17 8 - 27 mg/dL    Creatinine 0.76 0.76 - 1.27 mg/dL    EGFR Result 102 >59 mL/min/1.73    BUN/Creatinine Ratio 22 10 - 24    Sodium 141 134 - 144 mmol/L    Potassium 4.1 3.5 - 5.2 mmol/L    Chloride 100 96 - 106 mmol/L    Total CO2 26 20 - 29 mmol/L    Calcium 9.3 8.6 - 10.2 mg/dL    Total Protein 7.0 6.0 - 8.5 g/dL    Albumin 4.3 3.8 - 4.8 g/dL     "Globulin 2.7 1.5 - 4.5 g/dL    A/G Ratio 1.6 1.2 - 2.2    Total Bilirubin 0.3 0.0 - 1.2 mg/dL    Alkaline Phosphatase 77 44 - 121 IU/L    AST (SGOT) 25 0 - 40 IU/L    ALT (SGPT) 26 0 - 44 IU/L   Lipid Panel    Specimen: Blood   Result Value Ref Range    Total Cholesterol 118 100 - 199 mg/dL    Triglycerides 67 0 - 149 mg/dL    HDL Cholesterol 47 >39 mg/dL    VLDL Cholesterol Karri 14 5 - 40 mg/dL    LDL Chol Calc (NIH) 57 0 - 99 mg/dL   Microalbumin / Creatinine Urine Ratio - Urine, Clean Catch    Specimen: Urine, Clean Catch   Result Value Ref Range    Creatinine, Urine 30.6 Not Estab. mg/dL    Microalbumin, Urine 4.1 Not Estab. ug/mL    Microalbumin/Creatinine Ratio 13 0 - 29 mg/g creat   Cardiovascular Risk Assessment   Result Value Ref Range    Interpretation Note         Vitals:    06/13/22 1043   BP: 140/82   BP Location: Left arm   Patient Position: Sitting   Cuff Size: Large Adult   Pulse: 78   Temp: 97.3 °F (36.3 °C)   SpO2: 97%   Weight: 115 kg (253 lb)   Height: 172.7 cm (68\")     BP Readings from Last 3 Encounters:   06/13/22 140/82   02/14/22 146/88   02/10/22 148/80     Objective      Gen: alert, pleasant.  Neck: no bruit, no enlarged thyroid  Lungs: CTA  Heart: RR, no murmur  Feet: no edema  Pulses: intact       Assessment & Plan   Hypertension, stage 1 Evidence of target organ damage: prior coronary revascularization.    Diagnoses and all orders for this visit:    1. Essential hypertension  -     amLODIPine-benazepril (LOTREL) 10-40 MG per capsule; Take 1 capsule by mouth Daily. Indications: High Blood Pressure Disorder  Dispense: 90 capsule; Refill: 3    Blood pressure still not quite to goal.  He did not want to make any blood pressure changes.  Medication list updated today.  We will see him back in 6 months    Medication: no change.  Follow up: 6 months and as needed.    There are no Patient Instructions on file for this visit.  Medications Discontinued During This Encounter   Medication Reason   • " azithromycin (Zithromax Z-Syed) 250 MG tablet *Therapy completed   • Ginseng (GIN-ZING) 100 MG capsule *Therapy completed   • amLODIPine-benazepril (LOTREL) 10-40 MG per capsule Reorder        Return in about 6 months (around 12/13/2022) for blood pressure.    Limit salt  Limit alcoholic drinks to 1 a day  Limit caffeine to 1-2 servings a day    Dr. Lalito Hansen MD  [place], [place]  [facility].

## 2022-06-13 NOTE — PROGRESS NOTES
"Chief Complaint  Chief Complaint   Patient presents with   • Diabetes     Type 2       Subjective          History of Present Illness    Bam Negron 61 y.o. presents for a follow-up evaluation for type 2 DM    He has been diabetic since 2002    Pt is on the following medications for their DM: metformin 1,000 mg BID, actos 15 mg daily, glipizide 10 mg BID and Victozia 1.8 mg daily      SGLT2 were stopped due to yeast infections  Januvia was stopped due to being on GLP1      Pt complains of intermittent constipation.    Denies diarrhea, chest pain, shortness of breath, vision changes, numbness and tingling in feet/hands.    Weight stable since last visit.    Pt does not have a history of DM retinopathy.  Last eye exam was 02/21 -July 7th appointment    Pt does not have a history of nephropathy.  Patient is currently taking ACE    Pt does not have neuropathy.    Pt does have a history of CAD and MI.    Last A1C in 06/22 was 7.6    Last microalbumin in 06/22 was negative          Blood Sugars    Blood glucoses are checked 1-2/day.    Fasting blood glucoses:     Pt has had some episodes of hypoglycemia due to long time between meals and/or not eating meals            Hyperlipidemia     Pt denies any muscle/body aches, chest pain, or shortness of breath    Pt is currently taking atorvastatin 80 mg HS and zetia 10 mg daily    Last lipid panel in 06/22 showed Total 118, Triglycerides 67, HDL 47 and LDL 57            Hypertension    Pt denies any chest pain, palpitations, shortness of breath, or headache    Current regimen includes amlodipine-benazepril 10-40 mg daily, carvedilol 25 mg BID    Follows with cardiology            I have reviewed the patient's allergies, medicines, past medical hx, family hx and social hx.    Objective   Vital Signs:   /74   Pulse 86   Temp 98.3 °F (36.8 °C)   Ht 172.7 cm (67.99\")   Wt 114 kg (251 lb 9.6 oz)   SpO2 97%   BMI 38.26 kg/m²       Physical Exam   Physical " Exam  Constitutional:       General: He is not in acute distress.     Appearance: Normal appearance. He is not diaphoretic.   HENT:      Head: Normocephalic and atraumatic.   Eyes:      General:         Right eye: No discharge.         Left eye: No discharge.   Skin:     General: Skin is warm and dry.   Neurological:      Mental Status: He is alert.   Psychiatric:         Mood and Affect: Mood normal.         Behavior: Behavior normal.                    Results Review:   Hemoglobin A1C   Date Value Ref Range Status   06/01/2022 7.6 (H) 4.8 - 5.6 % Final     Comment:              Prediabetes: 5.7 - 6.4           Diabetes: >6.4           Glycemic control for adults with diabetes: <7.0     02/04/2016 6.7 % Final     Triglycerides   Date Value Ref Range Status   06/01/2022 67 0 - 149 mg/dL Final     HDL Cholesterol   Date Value Ref Range Status   06/01/2022 47 >39 mg/dL Final     LDL Chol Calc (NIH)   Date Value Ref Range Status   06/01/2022 57 0 - 99 mg/dL Final     VLDL Cholesterol Karri   Date Value Ref Range Status   06/01/2022 14 5 - 40 mg/dL Final         Assessment and Plan {CC Problem List  Visit Diagnosis  ROS  Review (Popup)  Health Maintenance  Quality  BestPractice  Medications  SmartSets  SnapShot Encounters  Media :23  Diagnoses and all orders for this visit:    1. Uncontrolled type 2 diabetes mellitus with hyperglycemia (HCC) (Primary)  -     Hemoglobin A1c; Future  -     Comprehensive Metabolic Panel; Future    A1C has improved to 7.6%  Continue with  metformin 1,000 mg BID, actos 15 mg daily, glipizide 10 mg BID and Victozia 1.8 mg daily  Pt is eating sporadically, some times waiting long time periods before eat and sometimes only eating once a day which is contributing to his low BGs  Pt is going to work on diet - eating at least twice a day - exercise and weight loss.  Increase BG checks to twice a day - in the morning before breakfast and then again before lunch/dinner or 2 hours after  lunch/dinner  No protein noted in urine - continue with lotrel  Check labs prior to next visit      2. Mixed hyperlipidemia  -     Comprehensive Metabolic Panel; Future  -     Lipid Panel; Future    Lipid panel is good, continue with statin and zetia      3. Essential hypertension, benign  -     Comprehensive Metabolic Panel; Future    Stable  Continue with current medication regimen  Defer management to PCP/cardiology        No refills needed at this time      RTC in 4 months with Dr. Joaquin, with labs prior      Follow Up     Patient was given instructions and counseling regarding her condition or for health maintenance advice. Please see specific information pulled into the AVS if appropriate.              Kristin Claros, WALE  06/14/22

## 2022-06-14 ENCOUNTER — OFFICE VISIT (OUTPATIENT)
Dept: ENDOCRINOLOGY | Age: 62
End: 2022-06-14

## 2022-06-14 VITALS
OXYGEN SATURATION: 97 % | TEMPERATURE: 98.3 F | DIASTOLIC BLOOD PRESSURE: 74 MMHG | HEIGHT: 68 IN | SYSTOLIC BLOOD PRESSURE: 140 MMHG | BODY MASS INDEX: 38.13 KG/M2 | WEIGHT: 251.6 LBS | HEART RATE: 86 BPM

## 2022-06-14 DIAGNOSIS — E78.2 MIXED HYPERLIPIDEMIA: ICD-10-CM

## 2022-06-14 DIAGNOSIS — I10 ESSENTIAL HYPERTENSION, BENIGN: ICD-10-CM

## 2022-06-14 DIAGNOSIS — E11.65 UNCONTROLLED TYPE 2 DIABETES MELLITUS WITH HYPERGLYCEMIA: Primary | ICD-10-CM

## 2022-06-14 PROCEDURE — 99214 OFFICE O/P EST MOD 30 MIN: CPT | Performed by: NURSE PRACTITIONER

## 2022-09-12 DIAGNOSIS — E78.2 MIXED HYPERLIPIDEMIA: ICD-10-CM

## 2022-09-12 RX ORDER — ATORVASTATIN CALCIUM 80 MG/1
TABLET, FILM COATED ORAL
Qty: 90 TABLET | Refills: 0 | Status: SHIPPED | OUTPATIENT
Start: 2022-09-12 | End: 2022-12-16

## 2022-09-20 DIAGNOSIS — E11.9 TYPE 2 DIABETES MELLITUS WITHOUT COMPLICATION, WITHOUT LONG-TERM CURRENT USE OF INSULIN: ICD-10-CM

## 2022-09-20 DIAGNOSIS — M51.26 LUMBAR HERNIATED DISC: ICD-10-CM

## 2022-09-20 RX ORDER — GLIPIZIDE 10 MG/1
TABLET ORAL
Qty: 180 TABLET | Refills: 0 | Status: SHIPPED | OUTPATIENT
Start: 2022-09-20 | End: 2023-03-17

## 2022-09-20 RX ORDER — DULOXETIN HYDROCHLORIDE 60 MG/1
CAPSULE, DELAYED RELEASE ORAL
Qty: 90 CAPSULE | Refills: 0 | Status: SHIPPED | OUTPATIENT
Start: 2022-09-20 | End: 2022-12-29 | Stop reason: SDUPTHER

## 2022-09-22 DIAGNOSIS — J40 BRONCHITIS: Primary | ICD-10-CM

## 2022-09-22 RX ORDER — AMOXICILLIN 875 MG/1
875 TABLET, COATED ORAL 2 TIMES DAILY
Qty: 20 TABLET | Refills: 0 | Status: SHIPPED | OUTPATIENT
Start: 2022-09-22 | End: 2022-10-02

## 2022-10-03 ENCOUNTER — LAB (OUTPATIENT)
Dept: ENDOCRINOLOGY | Age: 62
End: 2022-10-03

## 2022-10-03 DIAGNOSIS — E78.2 MIXED HYPERLIPIDEMIA: ICD-10-CM

## 2022-10-03 DIAGNOSIS — I10 ESSENTIAL HYPERTENSION, BENIGN: ICD-10-CM

## 2022-10-03 DIAGNOSIS — E11.65 UNCONTROLLED TYPE 2 DIABETES MELLITUS WITH HYPERGLYCEMIA: ICD-10-CM

## 2022-10-04 LAB
ALBUMIN SERPL-MCNC: 4.1 G/DL (ref 3.5–5.2)
ALBUMIN/GLOB SERPL: 2.1 G/DL
ALP SERPL-CCNC: 68 U/L (ref 39–117)
ALT SERPL-CCNC: 26 U/L (ref 1–41)
AST SERPL-CCNC: 24 U/L (ref 1–40)
BILIRUB SERPL-MCNC: 0.3 MG/DL (ref 0–1.2)
BUN SERPL-MCNC: 16 MG/DL (ref 8–23)
BUN/CREAT SERPL: 21.9 (ref 7–25)
CALCIUM SERPL-MCNC: 8.9 MG/DL (ref 8.6–10.5)
CHLORIDE SERPL-SCNC: 98 MMOL/L (ref 98–107)
CHOLEST SERPL-MCNC: 131 MG/DL (ref 0–200)
CO2 SERPL-SCNC: 32.4 MMOL/L (ref 22–29)
CREAT SERPL-MCNC: 0.73 MG/DL (ref 0.76–1.27)
EGFRCR SERPLBLD CKD-EPI 2021: 103.5 ML/MIN/1.73
GLOBULIN SER CALC-MCNC: 2 GM/DL
GLUCOSE SERPL-MCNC: 120 MG/DL (ref 65–99)
HBA1C MFR BLD: 6.7 % (ref 4.8–5.6)
HDLC SERPL-MCNC: 48 MG/DL (ref 40–60)
IMP & REVIEW OF LAB RESULTS: NORMAL
LDLC SERPL CALC-MCNC: 65 MG/DL (ref 0–100)
POTASSIUM SERPL-SCNC: 4 MMOL/L (ref 3.5–5.2)
PROT SERPL-MCNC: 6.1 G/DL (ref 6–8.5)
SODIUM SERPL-SCNC: 141 MMOL/L (ref 136–145)
TRIGL SERPL-MCNC: 97 MG/DL (ref 0–150)
VLDLC SERPL CALC-MCNC: 18 MG/DL (ref 5–40)

## 2022-10-11 ENCOUNTER — OFFICE VISIT (OUTPATIENT)
Dept: FAMILY MEDICINE CLINIC | Facility: CLINIC | Age: 62
End: 2022-10-11

## 2022-10-11 VITALS — OXYGEN SATURATION: 98 % | TEMPERATURE: 97.1 F | HEIGHT: 68 IN | HEART RATE: 84 BPM | BODY MASS INDEX: 38.27 KG/M2

## 2022-10-11 DIAGNOSIS — J06.9 ACUTE URI: Primary | ICD-10-CM

## 2022-10-11 DIAGNOSIS — J40 BRONCHITIS: ICD-10-CM

## 2022-10-11 PROBLEM — H90.6 MIXED CONDUCTIVE AND SENSORINEURAL HEARING LOSS OF BOTH EARS: Status: ACTIVE | Noted: 2022-10-11

## 2022-10-11 LAB
EXPIRATION DATE: NORMAL
FLUAV AG UPPER RESP QL IA.RAPID: NOT DETECTED
FLUBV AG UPPER RESP QL IA.RAPID: NOT DETECTED
INTERNAL CONTROL: NORMAL
Lab: NORMAL
SARS-COV-2 AG UPPER RESP QL IA.RAPID: NOT DETECTED

## 2022-10-11 PROCEDURE — 99213 OFFICE O/P EST LOW 20 MIN: CPT | Performed by: FAMILY MEDICINE

## 2022-10-11 PROCEDURE — 87428 SARSCOV & INF VIR A&B AG IA: CPT | Performed by: FAMILY MEDICINE

## 2022-10-11 RX ORDER — AZITHROMYCIN 250 MG/1
TABLET, FILM COATED ORAL
Qty: 6 TABLET | Refills: 0 | Status: SHIPPED | OUTPATIENT
Start: 2022-10-11 | End: 2022-11-28 | Stop reason: SDUPTHER

## 2022-10-11 NOTE — PROGRESS NOTES
"  Chief Complaint   Patient presents with   • Cough     A lot of congestion // no wheezing    • Generalized Body Aches       Upper Respiratory Infection: Patient complains of follow up on a URI. Symptoms include cough. Onset of symptoms was several weeks ago, unchanged since that time. He also c/o congestion for the past 3 weeks .  He is drinking plenty of fluids. Evaluation to date: called in for care. Treatment to date: antibiotics.  Ill contacts at home discussed. Given amoxil by phone.      Vitals:    10/11/22 0802   Pulse: 84   Temp: 97.1 °F (36.2 °C)   SpO2: 98%   Height: 172.7 cm (67.99\")     Gen: well appearing, alert  Ears: Tm's bulging without redness  Nose:  Congestion  Throat:  Red without exudate, some drainage, tonsils okay  Neck: no LAD  Lung: mild rales, good air movement, regular RR  Heart: RR without murmur  Skin: no rash.    Results for orders placed or performed in visit on 10/11/22   POCT SARS-CoV-2 Antigen CHANEL + Flu    Specimen: Swab   Result Value Ref Range    SARS Antigen Not Detected Not Detected, Presumptive Negative    Influenza A Antigen CHANEL Not Detected Not Detected    Influenza B Antigen CHANEL Not Detected Not Detected    Internal Control Passed Passed    Lot Number [id number]     Expiration Date 5-        Assessment & Plan   Diagnoses and all orders for this visit:    1. Acute URI (Primary)  -     POCT SARS-CoV-2 Antigen CHANEL + Flu  -     azithromycin (Zithromax) 250 MG tablet; Take 2 tablets the first day, then 1 tablet daily for 4 days.  Dispense: 6 tablet; Refill: 0    2. Bronchitis  -     azithromycin (Zithromax) 250 MG tablet; Take 2 tablets the first day, then 1 tablet daily for 4 days.  Dispense: 6 tablet; Refill: 0    flu and covid swab are negative.         There are no Patient Instructions on file for this visit.    Tylenol or Advil as needed for pain, fever, muscle aches  Plenty of fluids  Hand washing discussed  Warm tea for throat.  Pros and cons of antibiotic use " discussed    Dr. Lalito Hansen MD  [place], [place]  [facility]

## 2022-10-17 ENCOUNTER — OFFICE VISIT (OUTPATIENT)
Dept: ENDOCRINOLOGY | Age: 62
End: 2022-10-17

## 2022-10-17 VITALS
TEMPERATURE: 96.8 F | WEIGHT: 251.8 LBS | HEIGHT: 68 IN | DIASTOLIC BLOOD PRESSURE: 80 MMHG | OXYGEN SATURATION: 98 % | SYSTOLIC BLOOD PRESSURE: 132 MMHG | BODY MASS INDEX: 38.16 KG/M2 | HEART RATE: 75 BPM

## 2022-10-17 DIAGNOSIS — E11.65 UNCONTROLLED TYPE 2 DIABETES MELLITUS WITH HYPERGLYCEMIA: ICD-10-CM

## 2022-10-17 DIAGNOSIS — E11.59 TYPE 2 DIABETES MELLITUS WITH VASCULAR DISEASE: ICD-10-CM

## 2022-10-17 DIAGNOSIS — I10 ESSENTIAL HYPERTENSION, BENIGN: ICD-10-CM

## 2022-10-17 DIAGNOSIS — E78.2 MIXED HYPERLIPIDEMIA: ICD-10-CM

## 2022-10-17 DIAGNOSIS — E11.65 TYPE 2 DIABETES MELLITUS WITH HYPERGLYCEMIA, UNSPECIFIED WHETHER LONG TERM INSULIN USE: Primary | ICD-10-CM

## 2022-10-17 LAB — GLUCOSE BLDC GLUCOMTR-MCNC: 141 MG/DL (ref 70–130)

## 2022-10-17 PROCEDURE — 82962 GLUCOSE BLOOD TEST: CPT | Performed by: INTERNAL MEDICINE

## 2022-10-17 PROCEDURE — 99214 OFFICE O/P EST MOD 30 MIN: CPT | Performed by: INTERNAL MEDICINE

## 2022-10-17 RX ORDER — PIOGLITAZONEHYDROCHLORIDE 15 MG/1
TABLET ORAL
Qty: 90 TABLET | Refills: 1 | Status: SHIPPED | OUTPATIENT
Start: 2022-10-17

## 2022-10-17 NOTE — PROGRESS NOTES
Bam ARMSTRONG Migdalia, 61 y.o., Gender: male    Assessment/Plan    1.  Pt with DM Type 2 recently controlled w/ vascular dz.  A1c 6.7% 10/22, 7.6% 6/22, 8.1% 2/22, 6.5% 8/21, 8.3% 5/21, 8.3% 1/21.  Counseled that conservative goal A1c is to be <7 % and aggressive 6.5 %.   Counseled pt that overall risk with diabetes is related to long term complications of both small and large blood vessels and that the risk for CAD, MI, and stroke is much higher than general population.  Counseled that the goal of tx is prevention of further complications.  Counseled on lifestyle change as a key part of this process to improve all parameters related to diabetes.  Pt counseled on how to do freq bg checks with fasting AM, before meals, before bed, occasionally 2 hours after a meal, and at 2-3 am if awake.  Pt is not checking enough as directed based on current treatment plan to check 0-2 times a day.  Rec cont Metformin 1000 mg twice daily after AM and PM meals as that is optimal dose.  Rec cont Victoza 1.8 mg daily and note that should not be used in combination with Januvia, which was stopped earlier this year.  Pt reports has been on Glucotrol 10 mg twice daily for some time as well.  Rec cont Actos at 15 mg daily and as noted previously 30 mg is the max dose but currently is not needed.  Pt reports tried SGLT-2 in the past but it caused groin yeast infection so had to be stopped.   Pt notes he moved in with family back in July and since then he has changed his diet and activity and that is all that has changed in the past 3-4 months to account for the nearly 1% drop in A1c.  I had questioned that as being part of the problem earlier this year.  Pt reports plans to cont with things as has been since the move and realizes what he was doing was making things worse.  Pt also aware that next medication to add to current is one daily or basal insulin and pt wants to avoid insulin if possible.  As control has improved pt will return to PCP  for ongoing f/u and management.  If overall control declines as noted next item to be added is basal insulin and from there if things cont to not be as desired pt can be sent back for likely basal / bolus insulin to be initiated.  2.  Hypertension at goal of <140/90.  Pt on tx for renal protection.  Last microalbumin/creatinine ratio urine check 6/22 was normal.  3.  Hyperlipidemia LDL goal <70 and trig goal <150.  Rec cont current tx.  Will have f/u labs.      4.  Note visit started around 1145 related to delay getting pt ready.      Time Counseled: 15  Minutes  Total Time: 20  Minutes    Return to office in:   back to PCP      Random Glucose: 141 mg/dL      HPI Summary    Pt is here for evaluation of DM reported to be Type 2.  Pt reports DM since '02.  Pt reports blood glucoses are not known without record.  Pt reports weight has been gradually up over the past 10 yrs since retiring and after moving in July is done 5-10 lbs.  Pt reports in early July moved in with family and has changed his diet and level of activity since that move.  Pt has no report of fatigue.  Pt has no complaint of general muscle weakness.  Pt as some increased thirst and urination.  Pt denies any chest pain follows w/ cardiology.  Pt denies any shortness of breath.  Pt denies any abdominal complaints.  Pt denies any early satiety or postprandial bloating.  Pt denies any blurry vision.  Pt reports last seen by eye doctor 2/21 no retinopathy.  Pt reports problems with feet w/ n/t while sleeping at times but not on regular basis.  Pt denies any skin wounds.  Pt has report of depression is on tx  Pt reports good sleep quality with known apnea symptoms uses cpap.  Pt notes he worked nights for many yrs and it has taken him some time to adjust back to normal wake / sleep pattern, which just happened in the past year though he retired about 10 yrs ago.  Pt reports trying to follow a diet to lose weight and no set exercise.  Pt reports having  pneumonia vaccine in the past and did not get flu shot this last season.  Pt without any other complaints related to diabetes at this time.    Review of Systems  see HPI      Patient History    Past History (reviewed):    Medical:   Past Medical History:   Diagnosis Date   • Arthritis     OSTEO   • Blood type O+ 10/04/2016    By lab confirmation   • Cataract    • Closed fracture of one rib with delayed healing 12/28/2021    Posterior right 7th rib XR Ribs Right With PA Chest (12/26/2021 12:48)    • Colon polyp    • Coronary artery disease     Cardiac catheterization December 2011 severe single coronary artery disease of the left anterior descending completely occluded with good collateralization and also showed moderate left ventricular diastolic dysfunction   • Deviated septum    • Diastolic dysfunction     Demonstrated on cardiac cath    • Essential hypertension, benign    • Herniated lumbar intervertebral disc     THREE L 3, L4,L5   • History of broken collarbone     bilateral   • Left knee DJD    • Lumbar herniated disc     L3 & L5, h/o lumbar epidurals   • Mixed hyperlipidemia    • Multiple endocrine neoplasia (HCC)    • Myocardial infarction (HCC)     Anterior MI per electrocardiogram, Dr Baron follows   • Obesity    • Obstructive sleep apnea on CPAP 07/09/2013    Sleep Study completed by Dr. Bennett   • S/P cervical spinal fusion 2014    C 4-5,    • Sciatic pain     right leg   • Small bowel obstruction (HCC) 2013   • Testosterone deficiency 2006    Low t   • Type 2 diabetes mellitus with hyperglycemia (HCC) 2002   • Type 2 diabetes mellitus with vascular disease (HCC) 2011       Surgery:   Past Surgical History:   Procedure Laterality Date   • CARDIAC CATHETERIZATION  12/27/2011   • CERVICAL FUSION  12/31/2014    cervical fusion   • COLONOSCOPY  03/2010   • COLONOSCOPY N/A 09/23/2016    Procedure: COLONOSCOPY with polypectomy/tattooing;  Surgeon: Jonna Osborn MD;  [facility];  Service:    •  COLONOSCOPY Left 02/05/2021    Procedure: COLONOSCOPY FOR SCREENING;  Surgeon: Jayden Solis MD;  [place] [facility];  Service: Gastroenterology;  Laterality: Left;   • EPIDURAL      lumbar epidurals   • PILONIDAL CYSTECTOMY      x4   • OR TOTAL KNEE ARTHROPLASTY Right 10/17/2016    Procedure: TOTAL KNEE ARTHROPLASTY  KRIS--ANTIBIOTIC CEMENT ;  Surgeon: Roger Stone MD;  Location:  [facility];  Service: Orthopedics   • SEPTOPLASTY Bilateral 06/06/2018    Procedure: NASAL SEPTOPLASTY BILATERAL INFERIOR TURBINECTOMY;  Surgeon: Adiel Thompson MD;  Location:  [facility];  Service: ENT         Social History (reviewed):  Smoking 1-2 ppd 35-40 yrs quit '02, - ETOH, - Drugs, , Occupation retired      Medication List:  Current medications were reviewed.    Allergies:    Allergies   Allergen Reactions   • Farxiga [Dapagliflozin] Rash     Penile yeast infection       Physical Exam    VITALS:    Vitals:    10/17/22 1142   BP: 132/80   Pulse: 75   Temp: 96.8 °F (36 °C)   SpO2: 98%     Body mass index is 38.29 kg/m².    GENERAL:  Looks stated age, Sig obese  HEAD/EYES:  N/C, A/T, Mask in place  EXTREMITIES:  FROM  CNS:  A&Ox3    Full exam not done today.      Labs/Imaging  All available lab and imaging data were reviewed.        Juan Pablo Joaquin MD, FILIPPO, FACE, ECNU  10/17/2022  12:08 EDT

## 2022-10-17 NOTE — PATIENT INSTRUCTIONS
As discussed overall control has improved to desired level recently so changes needed today.  If you work to maintain the current diet and activity level likely no other medication would be needed.  If control declines again, the next item to add will be once daily or basal insulin to current combination of medications.  So if you want to avoid that make sure to keep with things as you have been doing recently.      As discussed now that you are controlled feel your PCP can manage again.  I outlined next steps and if things get out of hand again you can return.

## 2022-10-28 DIAGNOSIS — E11.65 UNCONTROLLED TYPE 2 DIABETES MELLITUS WITH HYPERGLYCEMIA: ICD-10-CM

## 2022-10-28 RX ORDER — PIOGLITAZONEHYDROCHLORIDE 15 MG/1
TABLET ORAL
Qty: 90 TABLET | Refills: 1 | OUTPATIENT
Start: 2022-10-28

## 2022-11-28 ENCOUNTER — PATIENT MESSAGE (OUTPATIENT)
Dept: FAMILY MEDICINE CLINIC | Facility: CLINIC | Age: 62
End: 2022-11-28

## 2022-11-28 DIAGNOSIS — J40 BRONCHITIS: ICD-10-CM

## 2022-11-28 DIAGNOSIS — J06.9 ACUTE URI: ICD-10-CM

## 2022-11-28 RX ORDER — GUAIFENESIN AND CODEINE PHOSPHATE 100; 10 MG/5ML; MG/5ML
5 SOLUTION ORAL 3 TIMES DAILY PRN
Qty: 180 ML | Refills: 0 | Status: SHIPPED | OUTPATIENT
Start: 2022-11-28

## 2022-11-28 RX ORDER — AZITHROMYCIN 250 MG/1
TABLET, FILM COATED ORAL
Qty: 6 TABLET | Refills: 0 | Status: SHIPPED | OUTPATIENT
Start: 2022-11-28 | End: 2022-12-02

## 2022-11-28 NOTE — TELEPHONE ENCOUNTER
From: Bam Negron  To: Lalito Hansen MD  Sent: 11/28/2022 9:18 AM EST  Subject: Bronchitis again    Could you send me out an antibiotic and something for coughing. I have an appointment with you on Friday, but l need something today. No fever just coughing,congested and wheezing. Thank you.

## 2022-12-02 ENCOUNTER — OFFICE VISIT (OUTPATIENT)
Dept: FAMILY MEDICINE CLINIC | Facility: CLINIC | Age: 62
End: 2022-12-02

## 2022-12-02 VITALS
HEART RATE: 83 BPM | DIASTOLIC BLOOD PRESSURE: 82 MMHG | OXYGEN SATURATION: 97 % | SYSTOLIC BLOOD PRESSURE: 140 MMHG | TEMPERATURE: 98 F | BODY MASS INDEX: 38.04 KG/M2 | HEIGHT: 68 IN | WEIGHT: 251 LBS

## 2022-12-02 DIAGNOSIS — J41.0 SIMPLE CHRONIC BRONCHITIS: Primary | ICD-10-CM

## 2022-12-02 DIAGNOSIS — I15.8 OTHER SECONDARY HYPERTENSION: ICD-10-CM

## 2022-12-02 PROCEDURE — 87428 SARSCOV & INF VIR A&B AG IA: CPT | Performed by: FAMILY MEDICINE

## 2022-12-02 PROCEDURE — 99213 OFFICE O/P EST LOW 20 MIN: CPT | Performed by: FAMILY MEDICINE

## 2022-12-02 RX ORDER — PREDNISOLONE 15 MG/5ML
15 SOLUTION ORAL
Qty: 35 ML | Refills: 0 | Status: SHIPPED | OUTPATIENT
Start: 2022-12-02 | End: 2022-12-09

## 2022-12-02 RX ORDER — HYDROCODONE BITARTRATE AND HOMATROPINE METHYLBROMIDE ORAL SOLUTION 5; 1.5 MG/5ML; MG/5ML
5 LIQUID ORAL EVERY 6 HOURS PRN
Qty: 120 ML | Refills: 0 | Status: SHIPPED | OUTPATIENT
Start: 2022-12-02

## 2022-12-02 RX ORDER — CEFDINIR 300 MG/1
300 CAPSULE ORAL 2 TIMES DAILY
Qty: 20 CAPSULE | Refills: 0 | Status: SHIPPED | OUTPATIENT
Start: 2022-12-02

## 2022-12-02 NOTE — PROGRESS NOTES
Chief Complaint   Patient presents with   • Hypertension   • Hyperlipidemia   • Bronchitis     Took last dose of z pack today      Answers for HPI/ROS submitted by the patient on 12/1/2022  What is the primary reason for your visit?: Diabetes  Diabetes type: type 2  MedicAlert ID: No  Disease duration: 22 Years  blurred vision: No  chest pain: No  fatigue: No  foot paresthesias: No  foot ulcerations: No  polydipsia: Yes  polyphagia: No  polyuria: No  visual change: No  weakness: No  weight loss: No  Symptom course: improving  confusion: No  dizziness: No  headaches: No  hunger: No  mood changes: No  nervous/anxious: No  pallor: No  seizures: No  sleepiness: No  speech difficulty: No  sweats: No  tremors: No  blackouts: No  hospitalization: No  nocturnal hypoglycemia: No  required assistance: Yes  required glucagon: Yes  CVA: No  heart disease: No  nephropathy: No  peripheral neuropathy: No  PVD: No  retinopathy: No  CAD risks: dyslipidemia, family history, obesity, sedentary lifestyle  Current treatments: diet, oral agent (triple therapy)  Treatment compliance: most of the time  Home blood tests: 3-4 x per day  Home urines: <1 x per month  Monitoring compliance: fair  Blood glucose trend: no change  breakfast time: 5-6 am  breakfast glucose level:   lunch time: 2-3 pm  lunch glucose level: 110-130  dinner time: 6-7 pm  dinner glucose level: 140-180  Overall: 110-130  Weight trend: stable  Current diet: generally healthy  Meal planning: avoidance of concentrated sweets  Exercise: intermittently  Dietitian visit: No  Eye exam current: Yes  Sees podiatrist: No      Upper Respiratory Infection: Patient complains of follow up on a URI. Symptoms include congestion, cough and sore throat. Onset of symptoms was 1 week ago, gradually worsening since that time. He also c/o achiness, congestion, cough described as productive, barking, harsh and worsening over time, low grade fever, nasal congestion and productive cough  "with  yellow colored sputum for the past 1 week .  He is drinking moderate amounts of fluids. Evaluation to date: none. Treatment to date: antibiotics and cough suppressants.  Ill contacts at home or school or work discussed.      Vitals:    12/02/22 1048   BP: 140/82   BP Location: Left arm   Patient Position: Sitting   Cuff Size: Large Adult   Pulse: 83   Temp: 98 °F (36.7 °C)   SpO2: 97%   Weight: 114 kg (251 lb)   Height: 172.7 cm (68\")     Gen: mildly ill appearing, alert  Ears: Tm's bulging without redness  Nose:  Congestion  Throat:  Red without exudate, some drainage, tonsils okay  Neck: no LAD  Lung: moderate rales, good air movement, regular RR  Heart: RR without murmur  Skin: no rash.    Results for orders placed or performed in visit on 12/02/22   POCT SARS-CoV-2 Antigen CHANEL + Flu    Specimen: Swab   Result Value Ref Range    SARS Antigen Not Detected Not Detected, Presumptive Negative    Influenza A Antigen CHANEL Not Detected Not Detected    Influenza B Antigen CHANEL Not Detected Not Detected    Internal Control Passed Passed    Lot Number [id number]     Expiration Date 3-9-2023        Assessment & Plan   Diagnoses and all orders for this visit:    1. Simple chronic bronchitis (HCC) (Primary)  -     HYDROcodone Bit-Homatrop MBr (HYCODAN) 5-1.5 MG/5ML solution; Take 5 mL by mouth Every 6 (Six) Hours As Needed for Cough.  Dispense: 120 mL; Refill: 0  -     cefdinir (OMNICEF) 300 MG capsule; Take 1 capsule by mouth 2 (Two) Times a Day.  Dispense: 20 capsule; Refill: 0  -     prednisoLONE (PRELONE) 15 MG/5ML solution oral solution; Take 5 mL by mouth Daily With Breakfast for 7 days.  Dispense: 35 mL; Refill: 0  -     POCT SARS-CoV-2 Antigen CHANEL + Flu    2. Other secondary hypertension    In office swab for flu and COVID is negative.    Patient informed me that I will be resuming his type 2 diabetes management.         There are no Patient Instructions on file for this visit.    Tylenol or Advil as needed for " pain, fever, muscle aches  Plenty of fluids  Hand washing discussed    Warm tea for throat.  Pros and cons of antibiotic use discussed    Dr. Lalito Hansen MD  [place], [place]  [facility]

## 2022-12-15 DIAGNOSIS — E78.2 MIXED HYPERLIPIDEMIA: ICD-10-CM

## 2022-12-16 RX ORDER — ATORVASTATIN CALCIUM 80 MG/1
TABLET, FILM COATED ORAL
Qty: 90 TABLET | Refills: 3 | Status: SHIPPED | OUTPATIENT
Start: 2022-12-16

## 2022-12-29 DIAGNOSIS — M51.26 LUMBAR HERNIATED DISC: ICD-10-CM

## 2022-12-29 RX ORDER — DULOXETIN HYDROCHLORIDE 60 MG/1
60 CAPSULE, DELAYED RELEASE ORAL DAILY
Qty: 90 CAPSULE | Refills: 0 | Status: SHIPPED | OUTPATIENT
Start: 2022-12-29 | End: 2023-03-28

## 2023-01-08 DIAGNOSIS — E11.9 TYPE 2 DIABETES MELLITUS WITHOUT COMPLICATION, WITHOUT LONG-TERM CURRENT USE OF INSULIN: ICD-10-CM

## 2023-01-08 RX ORDER — LIRAGLUTIDE 6 MG/ML
1.8 INJECTION SUBCUTANEOUS DAILY
Qty: 9 ML | Refills: 11 | Status: SHIPPED | OUTPATIENT
Start: 2023-01-08 | End: 2024-01-03

## 2023-03-02 DIAGNOSIS — E11.9 TYPE 2 DIABETES MELLITUS WITHOUT COMPLICATION, WITHOUT LONG-TERM CURRENT USE OF INSULIN: ICD-10-CM

## 2023-03-17 DIAGNOSIS — E11.9 TYPE 2 DIABETES MELLITUS WITHOUT COMPLICATION, WITHOUT LONG-TERM CURRENT USE OF INSULIN: ICD-10-CM

## 2023-03-17 RX ORDER — GLIPIZIDE 10 MG/1
TABLET ORAL
Qty: 180 TABLET | Refills: 0 | Status: SHIPPED | OUTPATIENT
Start: 2023-03-17

## 2023-03-27 DIAGNOSIS — M51.26 LUMBAR HERNIATED DISC: ICD-10-CM

## 2023-03-28 RX ORDER — DULOXETIN HYDROCHLORIDE 60 MG/1
CAPSULE, DELAYED RELEASE ORAL
Qty: 90 CAPSULE | Refills: 0 | Status: SHIPPED | OUTPATIENT
Start: 2023-03-28

## 2023-04-12 ENCOUNTER — OFFICE VISIT (OUTPATIENT)
Dept: FAMILY MEDICINE CLINIC | Facility: CLINIC | Age: 63
End: 2023-04-12
Payer: COMMERCIAL

## 2023-04-12 VITALS
BODY MASS INDEX: 38.04 KG/M2 | OXYGEN SATURATION: 97 % | WEIGHT: 251 LBS | HEART RATE: 85 BPM | TEMPERATURE: 98.5 F | HEIGHT: 68 IN | SYSTOLIC BLOOD PRESSURE: 132 MMHG | DIASTOLIC BLOOD PRESSURE: 80 MMHG

## 2023-04-12 DIAGNOSIS — H10.023 PINK EYE DISEASE OF BOTH EYES: Primary | ICD-10-CM

## 2023-04-12 PROCEDURE — 99213 OFFICE O/P EST LOW 20 MIN: CPT | Performed by: FAMILY MEDICINE

## 2023-04-12 RX ORDER — OFLOXACIN 3 MG/ML
SOLUTION/ DROPS OPHTHALMIC
COMMUNITY
Start: 2023-04-05

## 2023-04-12 RX ORDER — POLYMYXIN B SULFATE AND TRIMETHOPRIM 1; 10000 MG/ML; [USP'U]/ML
1 SOLUTION OPHTHALMIC EVERY 4 HOURS
Qty: 10 ML | Refills: 0 | Status: SHIPPED | OUTPATIENT
Start: 2023-04-12

## 2023-04-12 NOTE — PROGRESS NOTES
"  Subjective   Bam Negron is a 62 y.o. male who is here for   Chief Complaint   Patient presents with   • Eye Drainage   • Eye Pain     redness   .     History of Present Illness     Began having left eye pain redness and drainage 1 week ago.  Went to urgent care.  On drops had been 1 week.  Left eye improved on the right  No vision changes  No eye pain    On Floxin eye drops    The following portions of the patient's history were reviewed and updated as appropriate: allergies, current medications, past medical history, past social history, past surgical history and problem list.    Review of Systems    Objective   Vitals:    04/12/23 1600   BP: 132/80   Pulse: 85   Temp: 98.5 °F (36.9 °C)   SpO2: 97%   Weight: 114 kg (251 lb)   Height: 172.7 cm (68\")      Physical Exam  Eyes:      General:         Right eye: No discharge or hordeolum.         Left eye: No discharge or hordeolum.      Extraocular Movements: Extraocular movements intact.      Conjunctiva/sclera:      Right eye: Right conjunctiva is injected. No chemosis, exudate or hemorrhage.     Left eye: Left conjunctiva is injected. No chemosis, exudate or hemorrhage.     Pupils: Pupils are equal, round, and reactive to light.         Assessment & Plan   Diagnoses and all orders for this visit:    1. Pink eye disease of both eyes (Primary)  -     trimethoprim-polymyxin b (POLYTRIM) 60824-5.1 UNIT/ML-% ophthalmic solution; Administer 1 drop to both eyes Every 4 (Four) Hours. Indications: Bacterial Infection on the Surface of the Eye  Dispense: 10 mL; Refill: 0      There are no Patient Instructions on file for this visit.    Medications Discontinued During This Encounter   Medication Reason   • HYDROcodone Bit-Homatrop MBr (HYCODAN) 5-1.5 MG/5ML solution *Therapy completed   • guaiFENesin-codeine (GUAIFENESIN AC) 100-10 MG/5ML liquid *Therapy completed   • cefdinir (OMNICEF) 300 MG capsule *Therapy completed        No follow-ups on file.    Dr. Starkey " [place]  [place]

## 2023-04-19 DIAGNOSIS — I10 ESSENTIAL HYPERTENSION: ICD-10-CM

## 2023-04-19 RX ORDER — CARVEDILOL 25 MG/1
TABLET ORAL
Qty: 180 TABLET | Refills: 3 | Status: SHIPPED | OUTPATIENT
Start: 2023-04-19

## 2023-04-30 NOTE — PROGRESS NOTES
Follow Up [facility] Disorders Center Note     Chief Complaint:  JUANITA     Primary Care Physician: Lalito Hansen MD    Bam Negron is a 59 y.o.male  was last seen at [facility] sleep lab: 2015.  Presents today for follow-up with a CPAP machine has stopped working.  Machine is broken beyond repair is not blowing enough air properly over the past few days.  We have data from the past 30 days ranging from 2/18/2020-3/18/2020.  We also have a copy of his sleep study.  Sleep study was done July 2013.  This was done in the lab.  This showed overall AHI 12.9 events per hour.  Worse in supine position with AHI 37.6 events per hour.  Patient was titrated that night and best observed on pressure of 9 cm H2O.  He was started on auto CPAP.  He is currently on auto CPAP 8-12 cm H2O.    Results Review:  Downloads between 2/18/2020-3/18/2020.  Average usage is 9 hours 35 minutes.  Average AHI is 1.2.  Average AutoCPAP pressure is 11.5 cm H2O.    Current Medications:    Current Outpatient Medications:   •  albuterol sulfate  (90 Base) MCG/ACT inhaler, Inhale 2 puffs Every 4 (Four) Hours As Needed for Wheezing., Disp: 1 inhaler, Rfl: 1  •  amLODIPine-benazepril (LOTREL) 10-40 MG per capsule, TAKE ONE CAPSULE BY MOUTH DAILY, Disp: 90 capsule, Rfl: 2  •  aspirin 81 MG EC tablet, Take 81 mg by mouth Every Night. HOLD PRIOR TO SURGERY, Disp: , Rfl:   •  atorvastatin (LIPITOR) 80 MG tablet, TAKE ONE TABLET BY MOUTH DAILY, Disp: 90 tablet, Rfl: 0  •  carvedilol (COREG) 25 MG tablet, TAKE ONE TABLET BY MOUTH TWICE A DAY WITH MEALS, Disp: 180 tablet, Rfl: 0  •  DULoxetine (CYMBALTA) 60 MG capsule, TAKE ONE CAPSULE BY MOUTH DAILY, Disp: 90 capsule, Rfl: 0  •  gabapentin (NEURONTIN) 100 MG capsule, Take 1 capsule by mouth 3 (Three) Times a Day As Needed (neuralgia)., Disp: 30 capsule, Rfl: 0  •  Ginseng (GIN-ZING) 100 MG capsule, Take  by mouth., Disp: , Rfl:   •  glipizide (GLUCOTROL) 5 MG tablet, Take 1 tablet by mouth 2 (Two) Times a  Day Before Meals., Disp: 60 tablet, Rfl: 5  •  glucose blood test strip, Use as instructed to test blood sugar 3 times daily., Disp: 300 each, Rfl: 3  •  hydrocortisone 2.5 % ointment, Apply  topically to the appropriate area as directed 3 (Three) Times a Day As Needed for Irritation or Rash., Disp: 20 g, Rfl: 0  •  Insulin Pen Needle 31G X 5 MM misc, 1 Units Every Morning. USE WITH PEN TWICE A DAY AS DIRECTED, Disp: 100 each, Rfl: 4  •  JANUVIA 100 MG tablet, TAKE ONE TABLET BY MOUTH DAILY, Disp: 30 tablet, Rfl: 0  •  metaxalone (SKELAXIN) 800 MG tablet, Take 1 tablet by mouth 3 (Three) Times a Day As Needed for Muscle Spasms., Disp: 60 tablet, Rfl: 1  •  metFORMIN (GLUCOPHAGE) 1000 MG tablet, TAKE ONE TABLET BY MOUTH TWICE A DAY WITH MEALS, Disp: 180 tablet, Rfl: 0  •  Multiple Vitamins-Minerals (QC MULTI-DEYSI 50 & OVER PO), Take 2 tablets by mouth Every Morning., Disp: , Rfl:   •  naproxen sodium (ALEVE) 220 MG tablet, Take 440 mg by mouth. HOLD PRIOR TO SURGERY, Disp: , Rfl:   •  ONETOUCH DELICA LANCETS 33G misc, Use as directed to test blood sugar 3 times daily., Disp: 300 each, Rfl: 3  •  valACYclovir (VALTREX) 1000 MG tablet, Take 1 tablet by mouth 3 (Three) Times a Day. Indications: Infection of the Skin and/or Soft Tissue, Disp: 21 tablet, Rfl: 0  •  VICTOZA 18 MG/3ML solution pen-injector injection, Inject 1.8 mg under the skin into the appropriate area as directed Daily., Disp: 3 pen, Rfl: 11   also entered in Sleep Questionnaire    Patient  has a past medical history of Anxiety, Arthritis, Asthma, Blood type O+ (10/04/2016), Coronary artery disease, Depression, Deviated septum, Diastolic dysfunction, Herniated lumbar intervertebral disc, History of broken collarbone, Hypogonadism male, Left knee DJD, Lumbar herniated disc, Myocardial infarction (CMS/HCC), Obstructive sleep apnea on CPAP (07/09/2013), S/P cervical spinal fusion (2014), Sciatic pain, and Small bowel obstruction (CMS/HCC) (2013).    Social  "History:    Social History     Socioeconomic History   • Marital status:      Spouse name:    • Number of children: 4   • Years of education: Some College   • Highest education level: Not on file   Occupational History   • Occupation: retired   Tobacco Use   • Smoking status: Former Smoker     Packs/day: 3.00     Years: 20.00     Pack years: 60.00     Types: Cigarettes     Last attempt to quit:      Years since quittin.2   • Smokeless tobacco: Never Used   Substance and Sexual Activity   • Alcohol use: Yes     Alcohol/week: 1.0 standard drinks     Types: 1 Cans of beer per week     Comment: SOCIAL DRINKER - rarely   • Drug use: No   • Sexual activity: Yes     Partners: Female       Allergies:  Farxiga [dapagliflozin]    Review of Systems:    A complete review of systems was done and all were negative with the exception of nasal congestion postnasal drip anxiety depression    Vital Signs:    Vitals:    20 0900   BP: 144/92   Pulse: 78   Weight: 113 kg (250 lb)   Height: 172.7 cm (68\")     Body mass index is 38.01 kg/m².    Vital Signs /92   Pulse 78   Ht 172.7 cm (68\")   Wt 113 kg (250 lb)   BMI 38.01 kg/m²  Body mass index is 38.01 kg/m².    General Alert and oriented. No acute distress noted   Pharynx/Throat Class III Mallampati airway, large tongue, no evidence of redundant lateral pharyngeal tissue. No oral lesions. No thrush. Moist mucous membranes.   Head Normocephalic. Symmetrical. Atraumatic.    Nose No septal deviation. No drainage   Chest Wall Normal shape. Symmetric expansion with respiration. No tenderness.   Neck Trachea midline, no thyromegaly or adenopathy    Lungs Clear to auscultation bilaterally. No wheezes. No rhonchi. No rales. Respirations regular, even and unlabored.   Heart Regular rhythm and normal rate. Normal S1 and S2. No murmur   Abdomen Soft, non-tender and non-distended. Normal bowel sounds. No masses.   Extremities Moves all extremities well. No " edema   Psychiatric Normal mood and affect.     Impression:  1. Obstructive sleep apnea syndrome        Obstructive sleep apnea adequately treated with auto CPAP with good compliance and usage and no complaints of hypersomnolence.  Machine has broken beyond repair.  We have a copy of his sleep study on file.  Will order new auto CPAP 8-12 cm H2O.  Return to clinic in 1 month for follow-up or sooner if needed.    Patient uses the CPAP device and benefits from its use in terms of reduction of hypersomnia and snoring.Weight loss will be strongly beneficial to reduce the severity of sleep-disordered breathing.  Caution during activities that require prolonged concentration is strongly advised if sleepiness returns. Changing of PAP supplies regularly is important for effective use. Patient needs to change cushion on the mask or plugs on nasal pillows along with disposable filters once every month and change mask frame, tubing, headgear and Velcro straps every 6 months at the minimum.    Angel Bhandari MD  Sleep Medicine  03/19/20  10:27       negative...

## 2023-05-08 ENCOUNTER — TELEPHONE (OUTPATIENT)
Dept: ENDOCRINOLOGY | Age: 63
End: 2023-05-08
Payer: COMMERCIAL

## 2023-05-11 ENCOUNTER — PATIENT MESSAGE (OUTPATIENT)
Dept: FAMILY MEDICINE CLINIC | Facility: CLINIC | Age: 63
End: 2023-05-11
Payer: COMMERCIAL

## 2023-05-11 DIAGNOSIS — E11.65 UNCONTROLLED TYPE 2 DIABETES MELLITUS WITH HYPERGLYCEMIA: ICD-10-CM

## 2023-05-12 RX ORDER — PIOGLITAZONEHYDROCHLORIDE 15 MG/1
TABLET ORAL
Qty: 90 TABLET | Refills: 3 | Status: SHIPPED | OUTPATIENT
Start: 2023-05-12

## 2023-05-12 NOTE — TELEPHONE ENCOUNTER
From: Bam Negron  To: Lalito Hansen  Sent: 5/11/2023 4:24 PM EDT  Subject: Refill for pioglitazone hcl 15 MG tablet    Dr. Hansen, l need a refill on pioglitazone. I am asking you, because the last refill was from Dr. Joaquin. He released me. If you would please send it to the [facility] pharmacy. Thank you.

## 2023-05-23 DIAGNOSIS — R53.83 OTHER FATIGUE: ICD-10-CM

## 2023-05-23 DIAGNOSIS — I10 ESSENTIAL HYPERTENSION: ICD-10-CM

## 2023-05-23 DIAGNOSIS — Z00.00 ANNUAL PHYSICAL EXAM: ICD-10-CM

## 2023-05-23 DIAGNOSIS — Z12.5 PROSTATE CANCER SCREENING: ICD-10-CM

## 2023-05-23 DIAGNOSIS — E78.2 MIXED HYPERLIPIDEMIA: Primary | ICD-10-CM

## 2023-05-23 DIAGNOSIS — E11.65 TYPE 2 DIABETES MELLITUS WITH HYPERGLYCEMIA, WITHOUT LONG-TERM CURRENT USE OF INSULIN: ICD-10-CM

## 2023-06-01 DIAGNOSIS — E11.9 TYPE 2 DIABETES MELLITUS WITHOUT COMPLICATION, WITHOUT LONG-TERM CURRENT USE OF INSULIN: ICD-10-CM

## 2023-06-02 ENCOUNTER — OFFICE VISIT (OUTPATIENT)
Dept: FAMILY MEDICINE CLINIC | Facility: CLINIC | Age: 63
End: 2023-06-02

## 2023-06-02 VITALS
OXYGEN SATURATION: 97 % | HEART RATE: 76 BPM | BODY MASS INDEX: 37.89 KG/M2 | HEIGHT: 68 IN | SYSTOLIC BLOOD PRESSURE: 132 MMHG | TEMPERATURE: 97.8 F | WEIGHT: 250 LBS | DIASTOLIC BLOOD PRESSURE: 78 MMHG

## 2023-06-02 DIAGNOSIS — E11.9 TYPE 2 DIABETES MELLITUS WITHOUT COMPLICATION, WITHOUT LONG-TERM CURRENT USE OF INSULIN: ICD-10-CM

## 2023-06-02 DIAGNOSIS — I10 ESSENTIAL HYPERTENSION: ICD-10-CM

## 2023-06-02 DIAGNOSIS — Z00.00 ROUTINE ADULT HEALTH MAINTENANCE: Primary | ICD-10-CM

## 2023-06-02 DIAGNOSIS — M51.26 LUMBAR HERNIATED DISC: ICD-10-CM

## 2023-06-02 DIAGNOSIS — D64.9 LOW HEMOGLOBIN: ICD-10-CM

## 2023-06-02 RX ORDER — AMLODIPINE BESYLATE AND BENAZEPRIL HYDROCHLORIDE 10; 40 MG/1; MG/1
1 CAPSULE ORAL DAILY
Qty: 90 CAPSULE | Refills: 3 | Status: SHIPPED | OUTPATIENT
Start: 2023-06-02

## 2023-06-02 RX ORDER — GLIPIZIDE 10 MG/1
10 TABLET ORAL
Qty: 180 TABLET | Refills: 3 | Status: SHIPPED | OUTPATIENT
Start: 2023-06-02

## 2023-06-02 RX ORDER — ZOSTER VACCINE RECOMBINANT, ADJUVANTED 50 MCG/0.5
50 KIT INTRAMUSCULAR
Qty: 1 EACH | Refills: 1 | Status: SHIPPED | OUTPATIENT
Start: 2023-06-02 | End: 2023-06-02 | Stop reason: SDUPTHER

## 2023-06-02 RX ORDER — ZOSTER VACCINE RECOMBINANT, ADJUVANTED 50 MCG/0.5
50 KIT INTRAMUSCULAR
Qty: 1 EACH | Refills: 1 | Status: SHIPPED | OUTPATIENT
Start: 2023-06-02 | End: 2023-11-30

## 2023-06-02 RX ORDER — DULOXETIN HYDROCHLORIDE 60 MG/1
60 CAPSULE, DELAYED RELEASE ORAL DAILY
Qty: 90 CAPSULE | Refills: 3 | Status: SHIPPED | OUTPATIENT
Start: 2023-06-02

## 2023-06-02 NOTE — PROGRESS NOTES
"  Chief Complaint   Patient presents with   • Annual Exam       Subjective   Bam Negron is a 62 y.o. male and is here for a yearly physical exam. The patient reports no problems.    Do you take any herbs or supplements that were not prescribed by a doctor? no. If so, these will be added to active medication list.    The following portions of the patient's history were reviewed and updated as appropriate: allergies, current medications, past family history, past medical history, past social history, past surgical history and problem list.    Social and Family and Surgical History reviewed and updated today, see Rooming tab.    Health History, Preventive Measures and Vaccination flow sheets reviewed and updated today.    Patient's current medical chart in Epic; including previous office notes, Mychart messages, recent phone calls, imaging, labs, specialist's evaluation either in notes or in Media tab reviewed today.    Other outside pertinent medical information also reviewed thru Care Everywhere function is also reviewed today.    Review of Systems  Review of Systems  A comprehensive review of systems was negative.    Vitals:    06/02/23 0841   BP: 132/78   Pulse: 76   Temp: 97.8 °F (36.6 °C)   SpO2: 97%   Weight: 113 kg (250 lb)   Height: 172.7 cm (67.99\")     Body mass index is 38.02 kg/m².      General Appearance:  Alert, cooperative, no distress, appears stated age   Head:  Normocephalic, without obvious abnormality, atraumatic   Eyes:  PERRL, conjunctiva/corneas clear, EOM's intact.   Ears:  Normal TM's and external ear canals, both ears   Nose: Nares normal, septum midline, mucosa normal, no drainage or sinus tenderness   Throat: Lips, mucosa, and tongue normal; teeth and gums normal   Neck: Supple, symmetrical, trachea midline, no adenopathy;   thyroid: No enlargement/tenderness/nodules; no carotid  bruit   Back:  Symmetric, no curvature, ROM normal, no CVA tenderness   Lungs:  Clear to auscultation " bilaterally, respirations unlabored   Chest wall:  No tenderness or deformity   Heart:  Regular rate and rhythm, S1 and S2 normal, no murmur, rub or gallop   Abdomen:  Soft, non-tender, bowel sounds active all four quadrants,   no masses, no organomegaly   Rectal:        Extremities: Extremities normal, atraumatic, no cyanosis or edema   Pulses: 2+ and symmetric all extremities   Skin: Skin color, texture, turgor normal, no rashes or lesions   Lymph nodes: Cervical, supraclavicular, and axillary nodes normal   Neurologic: CNII-XII intact. Normal strength, sensation.          Results for orders placed or performed in visit on 05/26/23   CBC (No Diff)    Specimen: Blood   Result Value Ref Range    WBC 6.48 3.40 - 10.80 10*3/mm3    RBC 4.32 4.14 - 5.80 10*6/mm3    Hemoglobin 12.7 (L) 13.0 - 17.7 g/dL    Hematocrit 37.8 37.5 - 51.0 %    MCV 87.5 79.0 - 97.0 fL    MCH 29.4 26.6 - 33.0 pg    MCHC 33.6 31.5 - 35.7 g/dL    RDW 13.1 12.3 - 15.4 %    Platelets 378 140 - 450 10*3/mm3   Comprehensive Metabolic Panel    Specimen: Blood   Result Value Ref Range    Glucose 149 (H) 65 - 99 mg/dL    BUN 18 8 - 23 mg/dL    Creatinine 0.79 0.76 - 1.27 mg/dL    EGFR Result 100.4 >60.0 mL/min/1.73    BUN/Creatinine Ratio 22.8 7.0 - 25.0    Sodium 141 136 - 145 mmol/L    Potassium 4.2 3.5 - 5.2 mmol/L    Chloride 103 98 - 107 mmol/L    Total CO2 26.5 22.0 - 29.0 mmol/L    Calcium 9.2 8.6 - 10.5 mg/dL    Total Protein 6.1 6.0 - 8.5 g/dL    Albumin 4.0 3.5 - 5.2 g/dL    Globulin 2.1 gm/dL    A/G Ratio 1.9 g/dL    Total Bilirubin 0.4 0.0 - 1.2 mg/dL    Alkaline Phosphatase 83 39 - 117 U/L    AST (SGOT) 18 1 - 40 U/L    ALT (SGPT) 17 1 - 41 U/L   Hemoglobin A1c    Specimen: Blood   Result Value Ref Range    Hemoglobin A1C 7.60 (H) 4.80 - 5.60 %   Lipid Panel    Specimen: Blood   Result Value Ref Range    Total Cholesterol 131 0 - 200 mg/dL    Triglycerides 92 0 - 150 mg/dL    HDL Cholesterol 45 40 - 60 mg/dL    VLDL Cholesterol Karri 18 5 - 40  mg/dL    LDL Chol Calc (NIH) 68 0 - 100 mg/dL   PSA Screen    Specimen: Blood   Result Value Ref Range    PSA 0.128 0.000 - 4.000 ng/mL   TSH Rfx On Abnormal To Free T4    Specimen: Blood   Result Value Ref Range    TSH 1.700 0.270 - 4.200 uIU/mL   Urinalysis With Microscopic If Indicated (No Culture) - Urine, Clean Catch    Specimen: Urine, Clean Catch   Result Value Ref Range    Specific Gravity, UA 1.015 1.005 - 1.030    pH, UA 6.5 5.0 - 8.0    Color, UA Yellow     Appearance, UA Clear Clear    Leukocytes, UA Negative Negative    Protein Negative Negative    Glucose, UA See below: (A) Negative    Ketones Negative Negative    Blood, UA Negative Negative    Bilirubin, UA Negative Negative    Urobilinogen, UA Comment     Nitrite, UA Negative Negative     Assessment & Plan   Healthy male exam.  Diagnoses and all orders for this visit:    1. Routine adult health maintenance (Primary)  -     Discontinue: Zoster Vac Recomb Adjuvanted (Shingrix) 50 MCG/0.5ML reconstituted suspension; Inject 0.5 mL into the appropriate muscle as directed by prescriber Every 6 (Six) Months for 2 doses.  Dispense: 1 each; Refill: 1  -     Zoster Vac Recomb Adjuvanted (Shingrix) 50 MCG/0.5ML reconstituted suspension; Inject 0.5 mL into the appropriate muscle as directed by prescriber Every 6 (Six) Months for 2 doses.  Dispense: 1 each; Refill: 1    2. Essential hypertension  -     amLODIPine-benazepril (LOTREL) 10-40 MG per capsule; Take 1 capsule by mouth Daily. Indications: High Blood Pressure Disorder  Dispense: 90 capsule; Refill: 3    3. Lumbar herniated disc  -     DULoxetine (CYMBALTA) 60 MG capsule; Take 1 capsule by mouth Daily.  Dispense: 90 capsule; Refill: 3    4. Type 2 diabetes mellitus without complication, without long-term current use of insulin  -     glipizide (GLUCOTROL) 10 MG tablet; Take 1 tablet by mouth 2 (Two) Times a Day Before Meals.  Dispense: 180 tablet; Refill: 3  -     MicroAlbumin, Urine, Random - Urine, Clean  Catch; Future  -     Basic Metabolic Panel; Future  -     Hemoglobin A1c; Future    5. Low hemoglobin  -     Hemoglobin & Hematocrit, Blood; Future      1. ,  Overall doing fairly well.  Is enjoying living with his daughter in [place].  Both he and his wife moved in with them.  They are providing  for the grandchildren  his A1c did go up, and admits to not following his diet closely  Reports Victoza is becoming expensive.  Asked him to contact his insurance to see if Victoza is a preferred medication in the  drug class  Hemoglobin did go down a little bit.  He does routinely donate blood and plasma.  We will recheck hemoglobin next lab draw    2. Patient Counseling:  --Nutrition: Stressed importance of moderation in: sodium, caffeine, , saturated fat and cholesterol.  Discussed: caloric balance, sufficient intake of fresh fruits, vegetables, fiber, calcium, iron.  --Exercise: Stressed the importance of regular exercise.   --Substance Abuse: Discussed cessation and or reduction of tobacco, alcohol, or other drug use; driving or other dangerous activities under the influence.    --Dental health: Discussed importance of regular tooth brushing, flossing, and dental visits.  --Suggested having eyes and vision checked if needed or past due.  --Immunizations reviewed and given if needed.  --Discussed benefits of screening colonoscopy and or ColoGuard.  3. Discussed the patient's BMI with him.  The BMI is above average; BMI management plan is completed  4. Follow up in 4 months    There are no Patient Instructions on file for this visit.    Medications Discontinued During This Encounter   Medication Reason   • ofloxacin (OCUFLOX) 0.3 % ophthalmic solution *Therapy completed   • trimethoprim-polymyxin b (POLYTRIM) 90857-1.1 UNIT/ML-% ophthalmic solution *Therapy completed   • amLODIPine-benazepril (LOTREL) 10-40 MG per capsule Reorder   • glipizide (GLUCOTROL) 10 MG tablet Reorder   • DULoxetine (CYMBALTA) 60 MG  capsule Reorder   • Zoster Vac Recomb Adjuvanted (Shingrix) 50 MCG/0.5ML reconstituted suspension Reorder        Dr. Lalito Hansen MD  [place], [place]  [facility]

## 2023-07-21 ENCOUNTER — TELEPHONE (OUTPATIENT)
Dept: FAMILY MEDICINE CLINIC | Facility: CLINIC | Age: 63
End: 2023-07-21

## 2023-07-21 NOTE — TELEPHONE ENCOUNTER
Caller: Bam Negron    Relationship: Self    Best call back number: [phone]     What medication are you requesting: OZEMPIC    What are your current symptoms: VICTOZA HAS BECOME UNAFFORDABLE    How long have you been experiencing symptoms: PATIENT SENT IN THESE CONCERNS 7/13/2023 AND HAS NOT RECEIVED AN ANSWER. HE HAS BEEN OFF OF HIS VICTOZA FOR A WHILE DUE TO THIS ISSUE.    If a prescription is needed, what is your preferred pharmacy and phone number: [facility] [id number] - [address] [facility] [address] - [phone]  - [phone] FX

## 2023-07-21 NOTE — TELEPHONE ENCOUNTER
Spoke with patient and he advised that he was using a savings card before and now the victoza is going under his insurance and it is 107.00 a month. I advised him that the ozempic would be more. Patient advised that he could get a savings card with the ozempic.

## 2023-07-24 ENCOUNTER — TELEPHONE (OUTPATIENT)
Dept: FAMILY MEDICINE CLINIC | Facility: CLINIC | Age: 63
End: 2023-07-24
Payer: COMMERCIAL

## 2023-07-24 NOTE — TELEPHONE ENCOUNTER
Pt's wife called regarding a fax that should have been received from the pt's insurance comp, it is a PA for Ozempic. She is asking that it be filled out and sent back to them as soon as possible because the pt is without medication at this time. They can get the Ozempic with a discount card, they can no longer get the Victoza on a discount card and it will cost them over 100.00 that they can not afford.

## 2023-07-24 NOTE — TELEPHONE ENCOUNTER
Patient is requesting to change to ozempic due to insurance cost. Patient was using a savings card for the victoza and it has .   Patients cost for the ozempic with insurance will be $35.00 Please Advise    Okay to stop Victoza    Replace with Ozempic.  Sent into  pharmacy

## 2023-09-22 DIAGNOSIS — D64.9 LOW HEMOGLOBIN: ICD-10-CM

## 2023-09-22 DIAGNOSIS — E11.9 TYPE 2 DIABETES MELLITUS WITHOUT COMPLICATION, WITHOUT LONG-TERM CURRENT USE OF INSULIN: ICD-10-CM

## 2023-09-26 LAB
BUN SERPL-MCNC: 17 MG/DL (ref 8–27)
BUN/CREAT SERPL: 22 (ref 10–24)
CALCIUM SERPL-MCNC: 9.6 MG/DL (ref 8.6–10.2)
CHLORIDE SERPL-SCNC: 103 MMOL/L (ref 96–106)
CO2 SERPL-SCNC: 17 MMOL/L (ref 20–29)
CREAT SERPL-MCNC: 0.79 MG/DL (ref 0.76–1.27)
EGFRCR SERPLBLD CKD-EPI 2021: 100 ML/MIN/1.73
GLUCOSE SERPL-MCNC: ABNORMAL MG/DL
HBA1C MFR BLD: 6.8 % (ref 4.8–5.6)
MICROALBUMIN UR-MCNC: 23.1 UG/ML
POTASSIUM SERPL-SCNC: ABNORMAL MMOL/L
SODIUM SERPL-SCNC: 142 MMOL/L (ref 134–144)
SPECIMEN STATUS: NORMAL

## 2023-09-27 ENCOUNTER — TELEPHONE (OUTPATIENT)
Dept: FAMILY MEDICINE CLINIC | Facility: CLINIC | Age: 63
End: 2023-09-27
Payer: COMMERCIAL

## 2023-09-29 LAB
HCT VFR BLD AUTO: 37.9 % (ref 37.5–51)
HGB BLD-MCNC: 12.9 G/DL (ref 13–17.7)

## 2023-10-02 ENCOUNTER — OFFICE VISIT (OUTPATIENT)
Dept: FAMILY MEDICINE CLINIC | Facility: CLINIC | Age: 63
End: 2023-10-02
Payer: COMMERCIAL

## 2023-10-02 VITALS
OXYGEN SATURATION: 99 % | SYSTOLIC BLOOD PRESSURE: 130 MMHG | WEIGHT: 247 LBS | HEART RATE: 83 BPM | TEMPERATURE: 96.9 F | HEIGHT: 68 IN | DIASTOLIC BLOOD PRESSURE: 80 MMHG | BODY MASS INDEX: 37.44 KG/M2

## 2023-10-02 DIAGNOSIS — E78.2 MIXED HYPERLIPIDEMIA: ICD-10-CM

## 2023-10-02 DIAGNOSIS — L30.9 DERMATITIS: ICD-10-CM

## 2023-10-02 DIAGNOSIS — Z12.5 SCREENING FOR MALIGNANT NEOPLASM OF PROSTATE: ICD-10-CM

## 2023-10-02 DIAGNOSIS — E11.65 TYPE 2 DIABETES MELLITUS WITH HYPERGLYCEMIA, WITHOUT LONG-TERM CURRENT USE OF INSULIN: Primary | ICD-10-CM

## 2023-10-02 PROCEDURE — 99214 OFFICE O/P EST MOD 30 MIN: CPT | Performed by: FAMILY MEDICINE

## 2023-10-02 RX ORDER — NYSTATIN AND TRIAMCINOLONE ACETONIDE 100000; 1 [USP'U]/G; MG/G
1 OINTMENT TOPICAL 2 TIMES DAILY
Qty: 60 G | Refills: 0 | Status: SHIPPED | OUTPATIENT
Start: 2023-10-02

## 2023-10-02 NOTE — PROGRESS NOTES
"  Chief Complaint   Patient presents with    Diabetes       Subjective   Bam Negron is an 62 y.o. male who presents for follow up of diabetes. Current symptoms include: hyperglycemia. Patient denies foot ulcerations, hypoglycemia , nausea, paresthesia of the feet, polydipsia, polyuria, visual disturbances, and vomiting. Evaluation to date has included: fasting blood sugar, fasting lipid panel, hemoglobin A1C, and microalbuminuria. Home sugars: BGs are high in the morning. Current treatments:  add Ozempic . Discussed importance of yearly eye exams and checking feet for skin integrity.      The following portions of the patient's history were reviewed and updated as appropriate: allergies, current medications, past family history, past medical history, past social history, past surgical history, and problem list.        Review of Systems  Pertinent items are noted in HPI.     Vitals:    10/02/23 0941   BP: 130/80   Pulse: 83   Temp: 96.9 °F (36.1 °C)   SpO2: 99%   Weight: 112 kg (247 lb)   Height: 172.7 cm (67.99\")       Objective    Gen:  Alert, pleasant  Ears: canals clear, TMs normal  Throat: clear , no thrush, teeth ok  Neck: no bruit, no LAD  Lungs: clear  Heart: RR no murmur  Feet:  + rash on ankle    Laboratory:  Results for orders placed or performed in visit on 09/28/23   Hemoglobin & Hematocrit, Blood   Result Value Ref Range    Hemoglobin 12.9 (L) 13.0 - 17.7 g/dL    Hematocrit 37.9 37.5 - 51.0 %        Assessment & Plan        Discussed general issues about diabetes pathophysiology and management.  Continued sulfonylurea; see medication orders.   Continued metformin; see  medication orders.  Continued Actos; see medication orders.  Continued statin drug see medication orders.  Continued ACE inhibitor; see medication orders.  Follow up in 6 months or as needed.    Diagnoses and all orders for this visit:    1. Type 2 diabetes mellitus with hyperglycemia, without long-term current use of insulin " (Primary)  -     Semaglutide, 2 MG/DOSE, (OZEMPIC) 8 MG/3ML solution pen-injector; Inject 2 mg under the skin into the appropriate area as directed 1 (One) Time Per Week for 336 days. Indications: Type 2 Diabetes  Dispense: 9 mL; Refill: 3  -     MicroAlbumin, Urine, Random - Urine, Clean Catch; Future  -     Basic Metabolic Panel; Future  -     CBC (No Diff); Future  -     Hemoglobin A1c; Future  -     TSH Rfx On Abnormal To Free T4; Future  -     Urinalysis With Microscopic If Indicated (No Culture) - Urine, Clean Catch; Future    2. Mixed hyperlipidemia  -     Lipid Panel; Future    3. Screening for malignant neoplasm of prostate  -     PSA Screen; Future    4. Dermatitis  -     nystatin-triamcinolone (MYCOLOG) 572581-8.1 UNIT/GM-% ointment; Apply 1 application  topically to the appropriate area as directed 2 (Two) Times a Day.  Dispense: 60 g; Refill: 0    Nice A1c drop  Let up Ozempic dose to 2  Ok to donate blood again.  Asked him to seek new podiatry in [place]  And vaccines at [facility] in [place].      Discussed healthy diabetic eating plan.  May refer to ADA web site, diabetes.org    Return in about 6 months (around 4/2/2024) for Annual physical, diabetes, new medication follow up.  There are no Patient Instructions on file for this visit.  Medications Discontinued During This Encounter   Medication Reason    azithromycin (Zithromax) 250 MG tablet     Semaglutide, 1 MG/DOSE, (OZEMPIC) 4 MG/3ML solution pen-injector Dose adjustment         Dr. Lalito Hansen MD  [place], [place]  [facility].

## 2023-11-14 ENCOUNTER — OFFICE VISIT (OUTPATIENT)
Dept: FAMILY MEDICINE CLINIC | Facility: CLINIC | Age: 63
End: 2023-11-14
Payer: COMMERCIAL

## 2023-11-14 VITALS
BODY MASS INDEX: 37.44 KG/M2 | WEIGHT: 247 LBS | HEIGHT: 68 IN | TEMPERATURE: 98.1 F | SYSTOLIC BLOOD PRESSURE: 150 MMHG | HEART RATE: 90 BPM | OXYGEN SATURATION: 97 % | DIASTOLIC BLOOD PRESSURE: 80 MMHG

## 2023-11-14 DIAGNOSIS — L72.0 EPIDERMOID CYST OF SKIN OF CHEST: Primary | ICD-10-CM

## 2023-11-14 PROCEDURE — 99212 OFFICE O/P EST SF 10 MIN: CPT | Performed by: FAMILY MEDICINE

## 2023-11-14 NOTE — PROGRESS NOTES
"  Subjective   Bam Negron is a 62 y.o. male who is here for   Chief Complaint   Patient presents with    Cyst     chest   .     History of Present Illness   New cyst mid chest skin  The following portions of the patient's history were reviewed and updated as appropriate: allergies, current medications, past family history, past medical history, past social history, past surgical history, and problem list.    Review of Systems    Objective   Vitals:    11/14/23 1247   BP: 150/80   Pulse: 90   Temp: 98.1 °F (36.7 °C)   SpO2: 97%   Weight: 112 kg (247 lb)   Height: 172.7 cm (68\")      Physical Exam  2 cm epidermoid cyst mid chest    Assessment & Plan   Diagnoses and all orders for this visit:    1. Epidermoid cyst of skin of chest (Primary)    Will excise next week    There are no Patient Instructions on file for this visit.    There are no discontinued medications.     Return in about 1 year (around 11/14/2024) for skin procedure.    Dr. Lalito Hansen  [place], [place]    Answers submitted by the patient for this visit:  Primary Reason for Visit (Submitted on 11/13/2023)  What is the primary reason for your visit?: Other  Other (Submitted on 11/13/2023)  Please describe your symptoms.: Knot in middle of chest  Have you had these symptoms before?: No  How long have you been having these symptoms?: 1-4 days  Please list any medications you are currently taking for this condition.: None    "

## 2023-11-20 ENCOUNTER — PROCEDURE VISIT (OUTPATIENT)
Dept: FAMILY MEDICINE CLINIC | Facility: CLINIC | Age: 63
End: 2023-11-20
Payer: COMMERCIAL

## 2023-11-20 VITALS
SYSTOLIC BLOOD PRESSURE: 138 MMHG | OXYGEN SATURATION: 99 % | WEIGHT: 249 LBS | HEIGHT: 68 IN | DIASTOLIC BLOOD PRESSURE: 84 MMHG | BODY MASS INDEX: 37.74 KG/M2 | HEART RATE: 86 BPM | TEMPERATURE: 97.8 F

## 2023-11-20 DIAGNOSIS — L72.0 EPIDERMOID CYST OF SKIN OF CHEST: Primary | ICD-10-CM

## 2023-11-20 NOTE — PROGRESS NOTES
Procedure   Skin Excision    Date/Time: 11/20/2023 12:26 PM    Performed by: Lalito Hansen MD  Authorized by: Lalito Hansen MD    Consent:     Consent obtained:  Written    Consent given by:  Patient    Risks discussed:  Bleeding, infection and pain    Alternatives discussed:  No treatment  Pre-procedure details:     Preparation: Patient was prepped and draped in usual sterile fashion    Anesthesia:     Anesthesia method:  Local infiltration    Local anesthetic:  Lidocaine 1% WITH epi  Procedure details:     Body Area:  Chest    Trunk Location:  Chest    Malignancy: benign lesion      Initial Size:  2    Final defect size (mm):  2  Post-procedure details:     Patient tolerance of procedure:  Tolerated well, no immediate complications  Comments:      Bam returns today for a scheduled excision of a benign epidermoid cyst mid chest.  This was seen during his routine exam a few weeks ago.  After 3 cc lidocaine with epinephrine used 2 cm incision used to open up the skin above the cyst.  Cyst sac identified and removed in its entirety.  Wound cleaned out with iodine swab.  5 sutures used to reapproximate the skin edges.  Come back in 1 week

## 2023-12-17 DIAGNOSIS — E78.2 MIXED HYPERLIPIDEMIA: ICD-10-CM

## 2023-12-18 RX ORDER — ATORVASTATIN CALCIUM 80 MG/1
TABLET, FILM COATED ORAL
Qty: 90 TABLET | Refills: 3 | Status: SHIPPED | OUTPATIENT
Start: 2023-12-18

## 2024-03-27 ENCOUNTER — OFFICE VISIT (OUTPATIENT)
Dept: FAMILY MEDICINE CLINIC | Facility: CLINIC | Age: 64
End: 2024-03-27
Payer: COMMERCIAL

## 2024-03-27 VITALS
OXYGEN SATURATION: 95 % | DIASTOLIC BLOOD PRESSURE: 70 MMHG | TEMPERATURE: 97.5 F | HEART RATE: 82 BPM | HEIGHT: 68 IN | WEIGHT: 248 LBS | SYSTOLIC BLOOD PRESSURE: 120 MMHG | BODY MASS INDEX: 37.59 KG/M2

## 2024-03-27 DIAGNOSIS — I10 ESSENTIAL HYPERTENSION: ICD-10-CM

## 2024-03-27 DIAGNOSIS — E11.65 TYPE 2 DIABETES MELLITUS WITH HYPERGLYCEMIA, WITHOUT LONG-TERM CURRENT USE OF INSULIN: Primary | ICD-10-CM

## 2024-03-27 PROCEDURE — 99214 OFFICE O/P EST MOD 30 MIN: CPT | Performed by: FAMILY MEDICINE

## 2024-03-27 RX ORDER — CARVEDILOL 25 MG/1
25 TABLET ORAL 2 TIMES DAILY WITH MEALS
Qty: 180 TABLET | Refills: 3 | Status: SHIPPED | OUTPATIENT
Start: 2024-03-27

## 2024-03-27 NOTE — PROGRESS NOTES
"  Chief Complaint   Patient presents with    Diabetes       Subjective   Bma Negron is an 63 y.o. male who presents for follow up of diabetes. Current symptoms include: none. Patient denies foot ulcerations, hyperglycemia, hypoglycemia , paresthesia of the feet, and visual disturbances. Evaluation to date has included: fasting blood sugar, fasting lipid panel, hemoglobin A1C, and microalbuminuria. Home sugars: patient does not check sugars. Current treatments: more intensive attention to diet which has been somewhat effective, Continued sulfonylurea which has been somewhat effective, Continued metformin which has been somewhat effective, Continued statin which has been effective, Continued ACE inhibitor/ARB which has been effective, and Increased dose of ozempic which has been effective. Discussed importance of yearly eye exams and checking feet for skin integrity.      The following portions of the patient's history were reviewed and updated as appropriate: allergies, current medications, past family history, past medical history, past social history, past surgical history, and problem list.    Review of Systems  Pertinent items are noted in HPI.     Vitals:    03/27/24 1412   BP: 120/70   Pulse: 82   Temp: 97.5 °F (36.4 °C)   SpO2: 95%   Weight: 112 kg (248 lb)   Height: 172.7 cm (68\")             Objective    Gen:  Alert, pleasant  Ears: canals clear, TMs normal  Throat: clear , no thrush, teeth ok  Neck: no bruit, no LAD  Lungs: clear  Heart: RR no murmur  Feet:  No rash, no skin breakdown, sensation grossly normal.    Laboratory:  Results for orders placed or performed in visit on 03/19/24   MicroAlbumin, Urine, Random - Urine, Clean Catch    Specimen: Urine, Clean Catch   Result Value Ref Range    Microalbumin, Urine 20.4 Not Estab. ug/mL   Basic Metabolic Panel    Specimen: Blood   Result Value Ref Range    Glucose 152 (H) 70 - 99 mg/dL    BUN 27 8 - 27 mg/dL    Creatinine 0.79 0.76 - 1.27 mg/dL    EGFR " Result 100 >59 mL/min/1.73    BUN/Creatinine Ratio 34 (H) 10 - 24    Sodium 140 134 - 144 mmol/L    Potassium 4.3 3.5 - 5.2 mmol/L    Chloride 99 96 - 106 mmol/L    Total CO2 24 20 - 29 mmol/L    Calcium 9.4 8.6 - 10.2 mg/dL   CBC (No Diff)    Specimen: Blood   Result Value Ref Range    WBC 9.3 3.4 - 10.8 x10E3/uL    RBC 4.56 4.14 - 5.80 x10E6/uL    Hemoglobin 13.5 13.0 - 17.7 g/dL    Hematocrit 40.5 37.5 - 51.0 %    MCV 89 79 - 97 fL    MCH 29.6 26.6 - 33.0 pg    MCHC 33.3 31.5 - 35.7 g/dL    RDW 14.0 11.6 - 15.4 %    Platelets 381 150 - 450 x10E3/uL   Lipid Panel    Specimen: Blood   Result Value Ref Range    Total Cholesterol 122 100 - 199 mg/dL    Triglycerides 120 0 - 149 mg/dL    HDL Cholesterol 42 >39 mg/dL    VLDL Cholesterol Karri 22 5 - 40 mg/dL    LDL Chol Calc (NIH) 58 0 - 99 mg/dL   PSA Screen    Specimen: Blood   Result Value Ref Range    PSA 0.1 0.0 - 4.0 ng/mL   Hemoglobin A1c    Specimen: Blood   Result Value Ref Range    Hemoglobin A1C 6.5 (H) 4.8 - 5.6 %   TSH Rfx On Abnormal To Free T4    Specimen: Blood   Result Value Ref Range    TSH 1.410 0.450 - 4.500 uIU/mL   Urinalysis With Microscopic If Indicated (No Culture) - Urine, Clean Catch    Specimen: Urine, Clean Catch   Result Value Ref Range    Specific Gravity, UA 1.021 1.005 - 1.030    pH, UA 6.5 5.0 - 7.5    Color, UA Yellow Yellow    Appearance, UA Clear Clear    Leukocytes, UA Negative Negative    Protein Negative Negative/Trace    Glucose, UA Negative Negative    Ketones Negative Negative    Blood, UA Negative Negative    Bilirubin, UA Negative Negative    Urobilinogen, UA 0.2 0.2 - 1.0 mg/dL    Nitrite, UA Negative Negative    Microscopic Examination Comment         Assessment & Plan        Discussed general issues about diabetes pathophysiology and management.  Continued sulfonylurea; see medication orders.   Continued metformin; see  medication orders.  Continued Actos; see medication orders.  Continued statin drug see medication  orders.  Continued ACE inhibitor; see medication orders.  Follow up in 6 months or as needed.    Diagnoses and all orders for this visit:    1. Type 2 diabetes mellitus with hyperglycemia, without long-term current use of insulin (Primary)  -     MicroAlbumin, Urine, Random - Urine, Clean Catch; Future  -     CBC (No Diff); Future  -     Comprehensive Metabolic Panel; Future  -     Lipid Panel; Future  -     Hemoglobin A1c; Future  -     TSH Rfx On Abnormal To Free T4; Future  -     Urinalysis With Microscopic If Indicated (No Culture) - Urine, Clean Catch; Future    2. Essential hypertension  -     carvedilol (COREG) 25 MG tablet; Take 1 tablet by mouth 2 (Two) Times a Day With Meals. Indications: High Blood Pressure Disorder  Dispense: 180 tablet; Refill: 3    Doing well on Ozempic      Discussed healthy diabetic eating plan.  May refer to ADA web site, diabetes.org    No follow-ups on file.  There are no Patient Instructions on file for this visit.  Medications Discontinued During This Encounter   Medication Reason    nystatin-triamcinolone (MYCOLOG) 323445-4.1 UNIT/GM-% ointment *Therapy completed    carvedilol (COREG) 25 MG tablet Reorder         Dr. Lalito Hansen MD  [place], [place]  [facility].

## 2024-04-14 DIAGNOSIS — E11.65 UNCONTROLLED TYPE 2 DIABETES MELLITUS WITH HYPERGLYCEMIA: ICD-10-CM

## 2024-04-15 RX ORDER — PIOGLITAZONEHYDROCHLORIDE 15 MG/1
TABLET ORAL
Qty: 90 TABLET | Refills: 1 | Status: SHIPPED | OUTPATIENT
Start: 2024-04-15

## 2024-05-06 ENCOUNTER — PATIENT MESSAGE (OUTPATIENT)
Dept: FAMILY MEDICINE CLINIC | Facility: CLINIC | Age: 64
End: 2024-05-06
Payer: COMMERCIAL

## 2024-05-06 RX ORDER — AZITHROMYCIN 250 MG/1
TABLET, FILM COATED ORAL
Qty: 6 TABLET | Refills: 0 | Status: SHIPPED | OUTPATIENT
Start: 2024-05-06

## 2024-05-21 ENCOUNTER — READMISSION MANAGEMENT (OUTPATIENT)
Dept: CALL CENTER | Facility: HOSPITAL | Age: 64
End: 2024-05-21
Payer: COMMERCIAL

## 2024-05-22 ENCOUNTER — TRANSITIONAL CARE MANAGEMENT TELEPHONE ENCOUNTER (OUTPATIENT)
Dept: CALL CENTER | Facility: HOSPITAL | Age: 64
End: 2024-05-22
Payer: COMMERCIAL

## 2024-05-22 RX ORDER — ALBUTEROL SULFATE 0.63 MG/3ML
1 SOLUTION RESPIRATORY (INHALATION) EVERY 6 HOURS PRN
Qty: 30 EACH | Refills: 1 | Status: SHIPPED | OUTPATIENT
Start: 2024-05-22

## 2024-05-22 NOTE — OUTREACH NOTE
Prep Survey      Flowsheet Row Responses   [facility] facility patient discharged from? Non-BH   Is LACE score < 7 ? Non-BH Discharge   Eligibility [facility]   Date of Admission 05/09/24   Date of Discharge 05/21/24   Discharge Disposition Home or Self Care   Discharge diagnosis Pneumonia of right lung due to infectious organism,   Does the patient have one of the following disease processes/diagnoses(primary or secondary)? Pneumonia   Does the patient have Home health ordered? No   Is there a DME ordered? No   Prep survey completed? Yes            PIERRE ARANDA - Registered Nurse

## 2024-05-22 NOTE — OUTREACH NOTE
Call Center TCM Note      Flowsheet Row Responses   [facility] patient discharged from? Non-  [[place]]   Does the patient have one of the following disease processes/diagnoses(primary or secondary)? Pneumonia   TCM attempt successful? Yes   Call end time 1209   Discharge diagnosis Pneumonia of right lung due to infectious organism,   Person spoke with today (if not patient) and relationship Spouse-    Meds reviewed with patient/caregiver? Yes   Does the patient have all medications ordered at discharge? Yes   Prescription comments New medication- Prednisone.   Is the patient taking all medications as directed (includes completed medication regime)? Yes   Comments 5/31/2024  1:00 PM  HOSPITAL FOLLOW UP 30 min [facility]IMARY [facility] Lalito Hansen MD   Does the patient have an appointment with their PCP within 7-14 days of discharge? No   Nursing Interventions Assisted patient with making appointment per protocol   Has home health visited the patient within 72 hours of discharge? N/A   Psychosocial issues? No   What is the patient's perception of their health status since discharge? Improving   Is the patient/caregiver able to teach back signs and symptoms related to disease process for when to call PCP? Yes   Is the patient/caregiver able to teach back signs and symptoms related to disease process for when to call 911? Yes   Is the patient/caregiver able to teach back the hierarchy of who to call/visit for symptoms/problems? PCP, Specialist, Home health nurse, Urgent Care, ED, 911 Yes   TCM call completed? Yes   Wrap up additional comments Spouse reports patient is doing much better- Since patient has flare up and has albuterol inhaler spouse would like albuterol nebulizer at the home PRN for the future- Will send a message to pcp. Pharmacy : [facility]  [id number] - [address] [facility] [address] - [phone] [facility] [phone] FX No other concerns  or questions noted.   Call end time 1209   Would this patient benefit from a Referral to [facility] Social Work? No   Is the patient interested in additional calls from an ambulatory ? No            Luisa Zapata RN    5/22/2024, 12:10 EDT

## 2024-05-23 DIAGNOSIS — E11.9 TYPE 2 DIABETES MELLITUS WITHOUT COMPLICATION, WITHOUT LONG-TERM CURRENT USE OF INSULIN: ICD-10-CM

## 2024-05-23 RX ORDER — GLIPIZIDE 10 MG/1
10 TABLET ORAL
Qty: 180 TABLET | Refills: 3 | Status: SHIPPED | OUTPATIENT
Start: 2024-05-23

## 2024-05-24 ENCOUNTER — TELEPHONE (OUTPATIENT)
Dept: FAMILY MEDICINE CLINIC | Facility: CLINIC | Age: 64
End: 2024-05-24

## 2024-05-24 NOTE — TELEPHONE ENCOUNTER
Spoke with spouse, he will wait until he see's you next Friday to get the nebulizer machine at [facility] due to them needing a face to face documentation. She said he is fine right now with his inhalers.

## 2024-05-24 NOTE — TELEPHONE ENCOUNTER
Caller: Migdalia    Relationship: Emergency Contact    Best call back number: [phone]     What medication are you requesting: NEBULIZER MACHINE    If a prescription is needed, what is your preferred pharmacy and phone number:  [facility]    Additional notes: PATIENT'S WIFE STATES THAT HE WAS PRESCRIBED NEBULIZER TREATMENT AFTER BEING IN THE HOSPITAL, BUT HE DOES NOT HAVE A MACHINE FOR THE TREATMENT. REQUESTS TO SEND PRESCRIPTION TO [facility] AS SOON AS POSSIBLE

## 2024-05-31 ENCOUNTER — OFFICE VISIT (OUTPATIENT)
Dept: FAMILY MEDICINE CLINIC | Facility: CLINIC | Age: 64
End: 2024-05-31
Payer: COMMERCIAL

## 2024-05-31 VITALS
BODY MASS INDEX: 39.39 KG/M2 | OXYGEN SATURATION: 99 % | HEIGHT: 68 IN | TEMPERATURE: 97.7 F | WEIGHT: 259.9 LBS | SYSTOLIC BLOOD PRESSURE: 128 MMHG | HEART RATE: 82 BPM | DIASTOLIC BLOOD PRESSURE: 70 MMHG

## 2024-05-31 DIAGNOSIS — J18.9 MULTIFOCAL PNEUMONIA: ICD-10-CM

## 2024-05-31 DIAGNOSIS — J96.01 ACUTE RESPIRATORY FAILURE WITH HYPOXIA: Primary | ICD-10-CM

## 2024-05-31 DIAGNOSIS — Z09 HOSPITAL DISCHARGE FOLLOW-UP: ICD-10-CM

## 2024-05-31 DIAGNOSIS — J41.0 SIMPLE CHRONIC BRONCHITIS: ICD-10-CM

## 2024-05-31 NOTE — PROGRESS NOTES
"Transitional Care Follow Up Visit  Subjective     Bam Negron is a 63 y.o. male who presents for a transitional care management visit.    Within 48 business hours after discharge our office contacted him via telephone to coordinate his care and needs.      I reviewed and discussed the details of that call along with the discharge summary, hospital problems, inpatient lab results, inpatient diagnostic studies, and consultation reports with Bam.     Current outpatient and discharge medications have been reconciled for the patient.  Reviewed by: Lalito Hansen MD          5/21/2024     9:55 PM   Date of TCM Phone Call   [facility]   Date of Admission 5/9/2024   Date of Discharge 5/21/2024   Discharge Disposition Home or Self Care     Risk for Readmission (LACE) No data recorded    History of Present Illness   Course During Hospital Stay:  ,  Bam is here in hospital follow-up.  Admitted to [facility] on May 9.  With acute hypoxic respiratory failure and multi focal pneumonia.  Was in the ICU.  Did not have to be intubated.  Surgery pathogens was negative.  He is now at home.  Still weak but feeling better.  Has finished all the antibiotics and the steroids.  Reports that his home nebulizer is damaged we will offer him a new one today  He will see pulmonology on June 26      Current outpatient and discharge medications have been reconciled for the patient.  Reviewed by: Lalito Hansen MD       The following portions of the patient's history were reviewed and updated as appropriate: allergies, current medications, past family history, past medical history, past social history, past surgical history, and problem list.    Review of Systems    Objective   /70 (BP Location: Left arm, Patient Position: Sitting, Cuff Size: Adult)   Pulse 82   Temp 97.7 °F (36.5 °C)   Ht 172.7 cm (67.99\")   Wt 118 kg (259 lb 14.4 oz)   SpO2 99%   BMI 39.53 kg/m²   Physical Exam  Vitals reviewed. "   Cardiovascular:      Rate and Rhythm: Normal rate.   Pulmonary:      Effort: Pulmonary effort is normal.      Breath sounds: Wheezing present.   Neurological:      Mental Status: He is alert.     CT Angiogram Chest For PE (05/09/2024 14:52)   CT Angiogram Chest For PE (05/16/2024 16:13)   Assessment & Plan   Diagnoses and all orders for this visit:    1. Acute respiratory failure with hypoxia (Primary)    2. Hospital discharge follow-up    3. Simple chronic bronchitis  -     Home Nebulizer    4. Multifocal pneumonia

## 2024-06-18 DIAGNOSIS — I10 ESSENTIAL HYPERTENSION: ICD-10-CM

## 2024-06-18 RX ORDER — AMLODIPINE AND BENAZEPRIL HYDROCHLORIDE 10; 40 MG/1; MG/1
1 CAPSULE ORAL DAILY
Qty: 90 CAPSULE | Refills: 1 | Status: SHIPPED | OUTPATIENT
Start: 2024-06-18

## 2024-07-14 DIAGNOSIS — M51.26 LUMBAR HERNIATED DISC: ICD-10-CM

## 2024-07-15 RX ORDER — DULOXETIN HYDROCHLORIDE 60 MG/1
60 CAPSULE, DELAYED RELEASE ORAL DAILY
Qty: 90 CAPSULE | Refills: 3 | Status: SHIPPED | OUTPATIENT
Start: 2024-07-15

## 2024-09-10 DIAGNOSIS — E11.59 TYPE 2 DIABETES MELLITUS WITH VASCULAR DISEASE: ICD-10-CM

## 2024-09-12 LAB
ALBUMIN SERPL-MCNC: 3.9 G/DL (ref 3.9–4.9)
ALP SERPL-CCNC: 72 IU/L (ref 44–121)
ALT SERPL-CCNC: 18 IU/L (ref 0–44)
AST SERPL-CCNC: 21 IU/L (ref 0–40)
BILIRUB SERPL-MCNC: 0.2 MG/DL (ref 0–1.2)
BUN SERPL-MCNC: 18 MG/DL (ref 8–27)
BUN/CREAT SERPL: 23 (ref 10–24)
CALCIUM SERPL-MCNC: 9.7 MG/DL (ref 8.6–10.2)
CHLORIDE SERPL-SCNC: 99 MMOL/L (ref 96–106)
CHOLEST SERPL-MCNC: 129 MG/DL (ref 100–199)
CO2 SERPL-SCNC: 24 MMOL/L (ref 20–29)
CREAT SERPL-MCNC: 0.77 MG/DL (ref 0.76–1.27)
EGFRCR SERPLBLD CKD-EPI 2021: 101 ML/MIN/1.73
ERYTHROCYTE [DISTWIDTH] IN BLOOD BY AUTOMATED COUNT: 13.7 % (ref 11.6–15.4)
GLOBULIN SER CALC-MCNC: 2.1 G/DL (ref 1.5–4.5)
GLUCOSE SERPL-MCNC: 110 MG/DL (ref 70–99)
HBA1C MFR BLD: 6.6 % (ref 4.8–5.6)
HCT VFR BLD AUTO: 36.8 % (ref 37.5–51)
HDLC SERPL-MCNC: 44 MG/DL
HGB BLD-MCNC: 11.3 G/DL (ref 13–17.7)
LDLC SERPL CALC-MCNC: 67 MG/DL (ref 0–99)
MCH RBC QN AUTO: 27 PG (ref 26.6–33)
MCHC RBC AUTO-ENTMCNC: 30.7 G/DL (ref 31.5–35.7)
MCV RBC AUTO: 88 FL (ref 79–97)
PLATELET # BLD AUTO: 409 X10E3/UL (ref 150–450)
POTASSIUM SERPL-SCNC: 4.7 MMOL/L (ref 3.5–5.2)
PROT SERPL-MCNC: 6 G/DL (ref 6–8.5)
RBC # BLD AUTO: 4.19 X10E6/UL (ref 4.14–5.8)
SODIUM SERPL-SCNC: 141 MMOL/L (ref 134–144)
TRIGL SERPL-MCNC: 98 MG/DL (ref 0–149)
TSH SERPL DL<=0.005 MIU/L-ACNC: 1.61 UIU/ML (ref 0.45–4.5)
VLDLC SERPL CALC-MCNC: 18 MG/DL (ref 5–40)
WBC # BLD AUTO: 7.7 X10E3/UL (ref 3.4–10.8)

## 2024-09-18 ENCOUNTER — OFFICE VISIT (OUTPATIENT)
Dept: FAMILY MEDICINE CLINIC | Facility: CLINIC | Age: 64
End: 2024-09-18
Payer: COMMERCIAL

## 2024-09-18 VITALS
WEIGHT: 239 LBS | OXYGEN SATURATION: 97 % | SYSTOLIC BLOOD PRESSURE: 110 MMHG | HEART RATE: 88 BPM | TEMPERATURE: 97.8 F | BODY MASS INDEX: 36.22 KG/M2 | DIASTOLIC BLOOD PRESSURE: 74 MMHG | HEIGHT: 68 IN

## 2024-09-18 DIAGNOSIS — D64.9 LOW HEMOGLOBIN: ICD-10-CM

## 2024-09-18 DIAGNOSIS — E11.65 TYPE 2 DIABETES MELLITUS WITH HYPERGLYCEMIA, WITHOUT LONG-TERM CURRENT USE OF INSULIN: Primary | ICD-10-CM

## 2024-09-18 PROCEDURE — 99214 OFFICE O/P EST MOD 30 MIN: CPT | Performed by: FAMILY MEDICINE

## 2024-09-18 RX ORDER — SEMAGLUTIDE 2.68 MG/ML
2 INJECTION, SOLUTION SUBCUTANEOUS WEEKLY
Qty: 9 ML | Refills: 3 | Status: SHIPPED | OUTPATIENT
Start: 2024-09-18 | End: 2025-08-14

## 2024-09-18 RX ORDER — SEMAGLUTIDE 2.68 MG/ML
2 INJECTION, SOLUTION SUBCUTANEOUS WEEKLY
COMMUNITY
Start: 2024-09-08 | End: 2024-09-18 | Stop reason: SDUPTHER

## 2024-09-24 DIAGNOSIS — E11.65 UNCONTROLLED TYPE 2 DIABETES MELLITUS WITH HYPERGLYCEMIA: ICD-10-CM

## 2024-09-24 RX ORDER — BLOOD-GLUCOSE METER
1 EACH MISCELLANEOUS DAILY
Qty: 1 KIT | Refills: 0 | Status: SHIPPED | OUTPATIENT
Start: 2024-09-24

## 2024-09-24 RX ORDER — BLOOD SUGAR DIAGNOSTIC
STRIP MISCELLANEOUS
Qty: 100 EACH | Refills: 4 | Status: SHIPPED | OUTPATIENT
Start: 2024-09-24

## 2024-10-06 DIAGNOSIS — E11.65 UNCONTROLLED TYPE 2 DIABETES MELLITUS WITH HYPERGLYCEMIA: ICD-10-CM

## 2024-10-06 RX ORDER — PIOGLITAZONEHYDROCHLORIDE 15 MG/1
TABLET ORAL
Qty: 90 TABLET | Refills: 1 | Status: SHIPPED | OUTPATIENT
Start: 2024-10-06

## 2024-10-17 ENCOUNTER — TELEPHONE (OUTPATIENT)
Dept: FAMILY MEDICINE CLINIC | Facility: CLINIC | Age: 64
End: 2024-10-17

## 2024-10-17 NOTE — TELEPHONE ENCOUNTER
Caller: Bam Negron    Relationship: Self    Best call back number: [phone]     What is the best time to reach you: ANYTIME    What was the call regarding: PATIENT IS NEEDING TO COME IN AND GET A NEW STOOL SAMPLE KIT. CURRENT ONE HE MISREAD AND MESSED UP. PLEASE CALL AND LET HIM KNOW WHEN HE CAN COME AND  THE NEW KIT. WOULD LIKE THE KIT TOMORROW.

## 2024-10-25 ENCOUNTER — CLINICAL SUPPORT (OUTPATIENT)
Dept: FAMILY MEDICINE CLINIC | Facility: CLINIC | Age: 64
End: 2024-10-25
Payer: COMMERCIAL

## 2024-10-25 DIAGNOSIS — D64.9 LOW HEMOGLOBIN: ICD-10-CM

## 2024-10-25 DIAGNOSIS — K92.1 BLOOD IN STOOL: Primary | ICD-10-CM

## 2024-10-25 LAB
EXPIRATION DATE 2: NORMAL
EXPIRATION DATE 3: NORMAL
EXPIRATION DATE: NORMAL
GASTROCULT GAST QL: NEGATIVE
HEMOCCULT SP2 STL QL: NEGATIVE
HEMOCCULT SP3 STL QL: NEGATIVE
Lab: NORMAL

## 2024-10-25 PROCEDURE — 82274 ASSAY TEST FOR BLOOD FECAL: CPT | Performed by: FAMILY MEDICINE

## 2024-11-27 ENCOUNTER — OFFICE VISIT (OUTPATIENT)
Dept: FAMILY MEDICINE CLINIC | Facility: CLINIC | Age: 64
End: 2024-11-27
Payer: COMMERCIAL

## 2024-11-27 VITALS
TEMPERATURE: 98 F | BODY MASS INDEX: 37.74 KG/M2 | OXYGEN SATURATION: 95 % | HEART RATE: 90 BPM | SYSTOLIC BLOOD PRESSURE: 130 MMHG | HEIGHT: 68 IN | WEIGHT: 249 LBS | DIASTOLIC BLOOD PRESSURE: 76 MMHG

## 2024-11-27 DIAGNOSIS — I10 ESSENTIAL HYPERTENSION: ICD-10-CM

## 2024-11-27 DIAGNOSIS — E78.2 MIXED HYPERLIPIDEMIA: ICD-10-CM

## 2024-11-27 DIAGNOSIS — Z00.00 ROUTINE ADULT HEALTH MAINTENANCE: ICD-10-CM

## 2024-11-27 DIAGNOSIS — Z12.5 SCREENING FOR PROSTATE CANCER: ICD-10-CM

## 2024-11-27 DIAGNOSIS — E11.65 TYPE 2 DIABETES MELLITUS WITH HYPERGLYCEMIA, WITHOUT LONG-TERM CURRENT USE OF INSULIN: ICD-10-CM

## 2024-11-27 DIAGNOSIS — J41.0 SIMPLE CHRONIC BRONCHITIS: Primary | ICD-10-CM

## 2024-11-27 PROCEDURE — 99396 PREV VISIT EST AGE 40-64: CPT | Performed by: FAMILY MEDICINE

## 2024-11-27 RX ORDER — ATORVASTATIN CALCIUM 80 MG/1
80 TABLET, FILM COATED ORAL DAILY
Qty: 90 TABLET | Refills: 3 | Status: SHIPPED | OUTPATIENT
Start: 2024-11-27

## 2024-11-27 RX ORDER — AZITHROMYCIN 250 MG/1
TABLET, FILM COATED ORAL
Qty: 6 TABLET | Refills: 0 | Status: SHIPPED | OUTPATIENT
Start: 2024-11-27

## 2024-11-27 RX ORDER — AMLODIPINE AND BENAZEPRIL HYDROCHLORIDE 10; 40 MG/1; MG/1
1 CAPSULE ORAL DAILY
Qty: 90 CAPSULE | Refills: 3 | Status: SHIPPED | OUTPATIENT
Start: 2024-11-27

## 2024-11-27 RX ORDER — FERROUS SULFATE 324(65)MG
324 TABLET, DELAYED RELEASE (ENTERIC COATED) ORAL
COMMUNITY

## 2024-11-27 NOTE — PROGRESS NOTES
"  Chief Complaint   Patient presents with    Annual Exam       Subjective   Bam Negron is a 63 y.o. male and is here for a yearly physical exam. The patient reports no problems.    Do you take any herbs or supplements that were not prescribed by a doctor? yes. If so, these will be added to active medication list.    The following portions of the patient's history were reviewed and updated as appropriate: allergies, current medications, past family history, past medical history, past social history, past surgical history, and problem list.    Social and Family and Surgical History reviewed and updated today.    Health and Surgical History, Preventive Measures and Vaccination flow sheets reviewed and updated today.    Patient's current medical chart in Epic; including previous office notes, Mychart messages, recent phone calls, imaging, labs, specialist's evaluation either in notes or in Media tab reviewed today.    Other outside pertinent medical information also reviewed thru Care Everywhere function is also reviewed today.    Review of Systems  Review of Systems  Pertinent items are noted in HPI.    Vitals:    11/27/24 0940   BP: 130/76   BP Location: Left arm   Patient Position: Sitting   Cuff Size: Adult   Pulse: 90   Temp: 98 °F (36.7 °C)   SpO2: 95%   Weight: 113 kg (249 lb)   Height: 172.7 cm (68\")     Body mass index is 37.86 kg/m².             General Appearance:  Alert, cooperative, no distress, appears stated age   Head:  Normocephalic, without obvious abnormality, atraumatic   Eyes:  PERRL, conjunctiva/corneas clear, EOM's intact.   Ears:  Normal TM's and external ear canals, both ears   Nose: Nares normal, septum midline, mucosa normal, no drainage or sinus tenderness   Throat: Lips, mucosa, and tongue normal; teeth and gums viewed   Neck: Supple, symmetrical,  no adenopathy;   thyroid: No enlargement/tenderness/nodules;   no carotidbruit   Back:  Symmetric, no curvature, ROM normal, no CVA " tenderness   Lungs:  Clear to auscultation bilaterally, respirations unlabored   Chest wall:  No tenderness or deformity   Heart:  Regular rate and rhythm, S1 and S2 normal, no murmur.   Abdomen:  Soft, non-tender, bowel sounds active all four quadrants,   no masses, no organomegaly   Rectal:        Extremities: Extremities normal, atraumatic, no cyanosis or edema   Pulses: 2+ and symmetric all extremities   Skin: Skin color, texture, turgor normal, no rashes. No suspicious lesions   Lymph nodes: Cervical, supraclavicular, and axillary nodes normal   Neurologic: CNII-XII intact. Normal strength, sensation.          Results for orders placed or performed in visit on 10/25/24   POCT Occult blood x 3, stool    Collection Time: 10/25/24  2:22 PM    Specimen: Stool   Result Value Ref Range    Occult Blood Negative Negative    Expiration Date 03/31/2026     Lot Number 1 [id number]     Occult Blood 2 Negative Negative    Expiration Date 2 03/31/2026     Lot Number 2 [id number]     Occult Blood 3 Negative Negative    Expiration Date 3 03/31/2026     Lot Number 3 [id number]      Assessment & Plan   Healthy male exam.  Diagnoses and all orders for this visit:    1. Simple chronic bronchitis (Primary)    2. Routine adult health maintenance  -     azithromycin (Zithromax Z-Syed) 250 MG tablet; Take 2 tablets by mouth on day 1, then 1 tablet daily on days 2-5  Dispense: 6 tablet; Refill: 0  -     MicroAlbumin, Urine, Random - Urine, Clean Catch; Future  -     CBC (No Diff); Future  -     Comprehensive Metabolic Panel; Future  -     Lipid Panel; Future  -     Hemoglobin A1c; Future  -     Urinalysis With Microscopic If Indicated (No Culture) - Urine, Clean Catch; Future  -     TSH Rfx On Abnormal To Free T4; Future    3. Essential hypertension  -     amLODIPine-benazepril (LOTREL) 10-40 MG per capsule; Take 1 capsule by mouth Daily. Indications: High Blood Pressure  Dispense: 90 capsule; Refill: 3  -     MicroAlbumin, Urine, Random - Urine,  Clean Catch; Future  -     CBC (No Diff); Future  -     Comprehensive Metabolic Panel; Future  -     Lipid Panel; Future  -     Hemoglobin A1c; Future  -     Urinalysis With Microscopic If Indicated (No Culture) - Urine, Clean Catch; Future  -     TSH Rfx On Abnormal To Free T4; Future    4. Mixed hyperlipidemia  -     atorvastatin (LIPITOR) 80 MG tablet; Take 1 tablet by mouth Daily. Indications: High Amount of Fats in the Blood  Dispense: 90 tablet; Refill: 3    5. Type 2 diabetes mellitus with hyperglycemia, without long-term current use of insulin  -     MicroAlbumin, Urine, Random - Urine, Clean Catch; Future  -     CBC (No Diff); Future  -     Comprehensive Metabolic Panel; Future  -     Lipid Panel; Future  -     Hemoglobin A1c; Future  -     Urinalysis With Microscopic If Indicated (No Culture) - Urine, Clean Catch; Future  -     TSH Rfx On Abnormal To Free T4; Future    6. Screening for prostate cancer  -     PSA Screen; Future      1. Acute on chronic bronchitis today    2. Patient Counseling:  --Nutrition: Stressed importance of moderation in: sodium, caffeine, , saturated fat and cholesterol.  Discussed: caloric balance, sufficient intake of fresh fruits, vegetables, fiber, calcium, iron.  --Exercise: Stressed the importance of regular exercise.   --Substance Abuse: Discussed cessation and or reduction of tobacco, alcohol, or other drug use; driving or other dangerous activities under the influence.    --Dental health: Discussed importance of regular tooth brushing, flossing, and dental visits.  --Suggested having eyes and vision checked if needed or past due.  --Immunizations suggest influenza and covid vaccine in 2 weeks, after bronchitis resolves  --Discussed benefits of screening colonoscopy and or ColoGuard.  3. Discussed the patient's BMI with him.  The BMI is above average; BMI management plan is completed  4. Follow up in 6 months    There are no Patient Instructions on file for this  visit.    Medications Discontinued During This Encounter   Medication Reason    atorvastatin (LIPITOR) 80 MG tablet Reorder    amLODIPine-benazepril (LOTREL) 10-40 MG per capsule Reorder        Dr. Lalito Hansen MD  [place], [place]  [facility]

## 2025-03-13 ENCOUNTER — OFFICE VISIT (OUTPATIENT)
Dept: FAMILY MEDICINE CLINIC | Facility: CLINIC | Age: 65
End: 2025-03-13
Payer: COMMERCIAL

## 2025-03-13 VITALS
HEART RATE: 88 BPM | WEIGHT: 251.4 LBS | TEMPERATURE: 97.3 F | HEIGHT: 68 IN | DIASTOLIC BLOOD PRESSURE: 68 MMHG | BODY MASS INDEX: 38.1 KG/M2 | SYSTOLIC BLOOD PRESSURE: 128 MMHG | OXYGEN SATURATION: 96 %

## 2025-03-13 DIAGNOSIS — E11.65 UNCONTROLLED TYPE 2 DIABETES MELLITUS WITH HYPERGLYCEMIA: ICD-10-CM

## 2025-03-13 DIAGNOSIS — I10 ESSENTIAL HYPERTENSION: ICD-10-CM

## 2025-03-13 DIAGNOSIS — D64.9 LOW HEMOGLOBIN: ICD-10-CM

## 2025-03-13 DIAGNOSIS — E11.65 TYPE 2 DIABETES MELLITUS WITH HYPERGLYCEMIA, WITHOUT LONG-TERM CURRENT USE OF INSULIN: Primary | ICD-10-CM

## 2025-03-13 PROCEDURE — 99214 OFFICE O/P EST MOD 30 MIN: CPT | Performed by: FAMILY MEDICINE

## 2025-03-13 RX ORDER — PIOGLITAZONE 15 MG/1
TABLET ORAL
Qty: 90 TABLET | Refills: 4 | Status: SHIPPED | OUTPATIENT
Start: 2025-03-13

## 2025-03-13 RX ORDER — CARVEDILOL 25 MG/1
25 TABLET ORAL 2 TIMES DAILY WITH MEALS
Qty: 180 TABLET | Refills: 3 | Status: SHIPPED | OUTPATIENT
Start: 2025-03-13

## 2025-03-13 NOTE — PROGRESS NOTES
"  Chief Complaint   Patient presents with    Hypertension    Hyperlipidemia    Diabetes     Answers submitted by the patient for this visit:  Diabetes Questionnaire (Submitted on 3/10/2025)  Chief Complaint: Diabetes problem  Diabetes type: type 2  MedicAlert ID: No  Disease duration: 24 Years  Treatment compliance: most of the time  Symptom course: stable  Home blood tests: 3-4 x per week  High score:   Below 70: occasionally  blurred vision: No  foot paresthesias: No  foot ulcerations: No  polydipsia: No  polyuria: Yes  weight loss: No  blackouts: No  hospitalization: No  nocturnal hypoglycemia: Yes  required assistance: No  required glucagon: No  confusion: No  headaches: No  speech difficulty: No  sweats: Yes  tremors: Yes  Current diet: generally healthy  Meal planning: avoidance of concentrated sweets  Exercise: intermittently  Eye exam current: Yes  Sees podiatrist: No    Subjective   Bam Negron is an 64 y.o. male who presents for follow up of diabetes. Current symptoms include: hyperglycemia. Patient denies foot ulcerations, paresthesia of the feet, and visual disturbances. Evaluation to date has included: fasting blood sugar, fasting lipid panel, hemoglobin A1C, and microalbuminuria. Home sugars: BGs consistently in an acceptable range. Current treatments: none. Discussed importance of yearly eye exams and checking feet for skin integrity.  Still eating ice cream.      The following portions of the patient's history were reviewed and updated as appropriate: allergies, current medications, past family history, past medical history, past social history, past surgical history, and problem list.    Review of Systems  Pertinent items are noted in HPI.     Vitals:    03/13/25 1042   BP: 128/68   Pulse: 88   Temp: 97.3 °F (36.3 °C)   TempSrc: Infrared   SpO2: 96%   Weight: 114 kg (251 lb 6.4 oz)   Height: 172.7 cm (68\")       Objective    Gen:  Alert, pleasant  Ears: canals clear, TMs normal  Throat: " clear , no thrush, teeth ok  Neck: no bruit, no LAD  Lungs: clear  Heart: RR no murmur  Feet:  No rash, no skin breakdown, sensation grossly normal.    Laboratory:  Results for orders placed or performed in visit on 03/03/25   MicroAlbumin, Urine, Random - Urine, Clean Catch    Collection Time: 03/06/25 10:30 AM    Specimen: Urine, Clean Catch   Result Value Ref Range    Microalbumin, Urine 59.3 Not Estab. ug/mL   CBC (No Diff)    Collection Time: 03/06/25 10:30 AM    Specimen: Blood   Result Value Ref Range    WBC 6.5 3.4 - 10.8 x10E3/uL    RBC 4.58 4.14 - 5.80 x10E6/uL    Hemoglobin 12.7 (L) 13.0 - 17.7 g/dL    Hematocrit 40.3 37.5 - 51.0 %    MCV 88 79 - 97 fL    MCH 27.7 26.6 - 33.0 pg    MCHC 31.5 31.5 - 35.7 g/dL    RDW 14.4 11.6 - 15.4 %    Platelets 420 150 - 450 x10E3/uL   Comprehensive Metabolic Panel    Collection Time: 03/06/25 10:30 AM    Specimen: Blood   Result Value Ref Range    Glucose 117 (H) 70 - 99 mg/dL    BUN 18 8 - 27 mg/dL    Creatinine 0.79 0.76 - 1.27 mg/dL    EGFR Result 99 >59 mL/min/1.73    BUN/Creatinine Ratio 23 10 - 24    Sodium 140 134 - 144 mmol/L    Potassium 4.6 3.5 - 5.2 mmol/L    Chloride 100 96 - 106 mmol/L    Total CO2 27 20 - 29 mmol/L    Calcium 9.7 8.6 - 10.2 mg/dL    Total Protein 6.4 6.0 - 8.5 g/dL    Albumin 4.1 3.9 - 4.9 g/dL    Globulin 2.3 1.5 - 4.5 g/dL    Total Bilirubin 0.2 0.0 - 1.2 mg/dL    Alkaline Phosphatase 74 44 - 121 IU/L    AST (SGOT) 22 0 - 40 IU/L    ALT (SGPT) 20 0 - 44 IU/L   Lipid Panel    Collection Time: 03/06/25 10:30 AM    Specimen: Blood   Result Value Ref Range    Total Cholesterol 144 100 - 199 mg/dL    Triglycerides 81 0 - 149 mg/dL    HDL Cholesterol 52 >39 mg/dL    VLDL Cholesterol Karri 16 5 - 40 mg/dL    LDL Chol Calc (NIH) 76 0 - 99 mg/dL   PSA Screen    Collection Time: 03/06/25 10:30 AM    Specimen: Blood   Result Value Ref Range    PSA 0.2 0.0 - 4.0 ng/mL   Hemoglobin A1c    Collection Time: 03/06/25 10:30 AM    Specimen: Blood   Result  Value Ref Range    Hemoglobin A1C 6.5 (H) 4.8 - 5.6 %   Urinalysis With Microscopic If Indicated (No Culture) - Urine, Clean Catch    Collection Time: 03/06/25 10:30 AM    Specimen: Urine, Clean Catch   Result Value Ref Range    Specific Gravity, UA 1.022 1.005 - 1.030    pH, UA 6.0 5.0 - 7.5    Color, UA Yellow Yellow    Appearance, UA Clear Clear    Leukocytes, UA Negative Negative    Protein Trace Negative/Trace    Glucose, UA Negative Negative    Ketones Trace (A) Negative    Blood, UA Negative Negative    Bilirubin, UA Negative Negative    Urobilinogen, UA 0.2 0.2 - 1.0 mg/dL    Nitrite, UA Negative Negative    Microscopic Examination Comment    TSH Rfx On Abnormal To Free T4    Collection Time: 03/06/25 10:30 AM    Specimen: Blood   Result Value Ref Range    TSH 1.450 0.450 - 4.500 uIU/mL      A1C Last 3 Results          5/10/2024    04:00 9/11/2024    10:00 3/6/2025    10:30   HGBA1C Last 3 Results   Hemoglobin A1C 6.2     6.6  6.5       Details          This result is from an external source.              Assessment & Plan        Discussed general issues about diabetes pathophysiology and management.  Addressed ADA diet.  Suggested low cholesterol diet.  Encouraged aerobic exercise.  Continued sulfonylurea; see medication orders.   Continued metformin; see  medication orders.  Continued Actos; see medication orders.  Continued statin drug see medication orders.  Continued ACE inhibitor; see medication orders.  Follow up in 6 months or as needed.    Diagnoses and all orders for this visit:    1. Type 2 diabetes mellitus with hyperglycemia, without long-term current use of insulin (Primary)  -     pioglitazone (ACTOS) 15 MG tablet; TAKE 1 TABLET BY MOUTH DAILY  Indications: Type 2 Diabetes  Dispense: 90 tablet; Refill: 4  -     Comprehensive Metabolic Panel; Future  -     Lipid Panel; Future  -     Microalbumin / Creatinine Urine Ratio - Urine, Clean Catch; Future  -     Hemoglobin A1c; Future    2. Essential  hypertension  -     carvedilol (COREG) 25 MG tablet; Take 1 tablet by mouth 2 (Two) Times a Day With Meals. Indications: High Blood Pressure  Dispense: 180 tablet; Refill: 3  -     Comprehensive Metabolic Panel; Future  -     Lipid Panel; Future    3. Uncontrolled type 2 diabetes mellitus with hyperglycemia    4. Low hemoglobin  -     Hemoglobin & Hematocrit, Blood; Future  -     Iron and TIBC; Future  -     Ferritin; Future    Labs reviewed.  A1c is acceptable  Hemoglobin has improved.  Continue OTC iron several times per week      Education: Reviewed ‘ABCs’ of diabetes management:    A1C (<7), blood pressure (<130/80), and cholesterol (LDL <100).  Discussed healthy diabetic eating plan.  May refer to ADA web site, diabetes.org    No follow-ups on file.  There are no Patient Instructions on file for this visit.  Medications Discontinued During This Encounter   Medication Reason    azithromycin (Zithromax Z-Syed) 250 MG tablet     carvedilol (COREG) 25 MG tablet Reorder    pioglitazone (ACTOS) 15 MG tablet Reorder         Dr. Lalito Hansen MD  [place], [place]  [facility].

## 2025-05-09 RX ORDER — LANCING DEVICE/LANCETS
1 KIT MISCELLANEOUS 3 TIMES DAILY
Qty: 300 KIT | Refills: 3 | Status: SHIPPED | OUTPATIENT
Start: 2025-05-09

## 2025-05-09 RX ORDER — BLOOD SUGAR DIAGNOSTIC
STRIP MISCELLANEOUS
Qty: 300 EACH | Refills: 3 | Status: SHIPPED | OUTPATIENT
Start: 2025-05-09

## 2025-05-15 DIAGNOSIS — E11.9 TYPE 2 DIABETES MELLITUS WITHOUT COMPLICATION, WITHOUT LONG-TERM CURRENT USE OF INSULIN: ICD-10-CM

## 2025-05-15 RX ORDER — GLIPIZIDE 10 MG/1
10 TABLET ORAL
Qty: 180 TABLET | Refills: 3 | Status: SHIPPED | OUTPATIENT
Start: 2025-05-15

## 2025-07-04 ENCOUNTER — APPOINTMENT (OUTPATIENT)
Dept: CT IMAGING | Facility: HOSPITAL | Age: 65
End: 2025-07-04
Payer: COMMERCIAL

## 2025-07-04 ENCOUNTER — HOSPITAL ENCOUNTER (EMERGENCY)
Facility: HOSPITAL | Age: 65
Discharge: HOME | End: 2025-07-04
Attending: EMERGENCY MEDICINE
Payer: COMMERCIAL

## 2025-07-04 ENCOUNTER — APPOINTMENT (OUTPATIENT)
Dept: GENERAL RADIOLOGY | Facility: HOSPITAL | Age: 65
End: 2025-07-04
Payer: COMMERCIAL

## 2025-07-04 VITALS
HEART RATE: 85 BPM | BODY MASS INDEX: 37.22 KG/M2 | RESPIRATION RATE: 18 BRPM | OXYGEN SATURATION: 94 % | HEIGHT: 68 IN | TEMPERATURE: 98.4 F | DIASTOLIC BLOOD PRESSURE: 76 MMHG | WEIGHT: 245.6 LBS | SYSTOLIC BLOOD PRESSURE: 148 MMHG

## 2025-07-04 DIAGNOSIS — T07.XXXA MULTIPLE ABRASIONS: ICD-10-CM

## 2025-07-04 DIAGNOSIS — T07.XXXA MULTIPLE LACERATIONS: ICD-10-CM
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
## 2025-07-04 DIAGNOSIS — S09.90XA INJURY OF HEAD, INITIAL ENCOUNTER: ICD-10-CM

## 2025-07-04 PROCEDURE — 99284 EMERGENCY DEPT VISIT MOD MDM: CPT

## 2025-07-04 PROCEDURE — 70450 CT HEAD/BRAIN W/O DYE: CPT

## 2025-07-04 PROCEDURE — 12002 RPR S/N/AX/GEN/TRNK2.6-7.5CM: CPT

## 2025-07-04 PROCEDURE — 25010000002 LIDOCAINE PF 1% 1 % SOLUTION

## 2025-07-04 PROCEDURE — 71250 CT THORAX DX C-: CPT

## 2025-07-04 PROCEDURE — 99214 OFFICE O/P EST MOD 30 MIN: CPT

## 2025-07-04 PROCEDURE — 72125 CT NECK SPINE W/O DYE: CPT

## 2025-07-04 RX ORDER — CEPHALEXIN 500 MG/1
500 CAPSULE ORAL 4 TIMES DAILY
Qty: 20 CAPSULE | Refills: 0 | Status: SHIPPED | OUTPATIENT
Start: 2025-07-04 | End: 2025-07-09

## 2025-07-04 RX ORDER — DIAPER,BRIEF,INFANT-TODD,DISP
1 EACH MISCELLANEOUS ONCE
Status: COMPLETED | OUTPATIENT
Start: 2025-07-04 | End: 2025-07-04

## 2025-07-04 RX ORDER — LIDOCAINE HYDROCHLORIDE 10 MG/ML
5 INJECTION, SOLUTION EPIDURAL; INFILTRATION; INTRACAUDAL; PERINEURAL ONCE
Status: COMPLETED | OUTPATIENT
Start: 2025-07-04 | End: 2025-07-04

## 2025-07-04 RX ORDER — CEPHALEXIN 500 MG/1
500 CAPSULE ORAL ONCE
Status: COMPLETED | OUTPATIENT
Start: 2025-07-04 | End: 2025-07-04

## 2025-07-04 RX ADMIN — BACITRACIN 0.9 G: 500 OINTMENT TOPICAL at 17:56

## 2025-07-04 RX ADMIN — LIDOCAINE HYDROCHLORIDE 5 ML: 10 INJECTION, SOLUTION EPIDURAL; INFILTRATION; INTRACAUDAL; PERINEURAL at 17:56

## 2025-07-04 RX ADMIN — Medication 3 ML: at 17:56

## 2025-07-04 RX ADMIN — CEPHALEXIN 500 MG: 500 CAPSULE ORAL at 19:33

## 2025-07-04 NOTE — FSED PROVIDER NOTE
Subjective   History of Present Illness  Patient is a 64-year-old male with history of anemia, anxiety, asthma, MI, hyperlipidemia, pneumonia, diabetes who presents today with multiple injuries related to a trip and fall at the swimming pool today.  He reports he fell and landed on the left side of his body.  He struck his forehead causing a small laceration to his left eyebrow.  He has a laceration to his left hand, and 2 for his palm.  He has an abrasion to his left knee, and abdomen.  He denies loss of consciousness, blurred vision, diplopia, neck pain, numbness, tingling or loss of sensation, nausea, vomiting, diarrhea, chest pain or shortness of air.  He reports he is on 81 mg of aspirin daily.        Review of Systems    Past Medical History:   Diagnosis Date    Allergic 2014    Seasonal, trees,cats, grasses    Anemia 12/15/24    Anxiety 1996    Arthritis     OSTEO    Asthma     Blood type O+ 10/04/2016    By lab confirmation    Cataract     Closed fracture of one rib with delayed healing 12/28/2021    Posterior right 7th rib XR Ribs Right With PA Chest (12/26/2021 12:48)     Colon polyp     Coronary artery disease     Cardiac catheterization December 2011 severe single coronary artery disease of the left anterior descending completely occluded with good collateralization and also showed moderate left ventricular diastolic dysfunction    Deviated septum     Diastolic dysfunction     Demonstrated on cardiac cath     Erectile dysfunction     Essential hypertension, benign     Herniated lumbar intervertebral disc     THREE L 3, L4,L5    History of broken collarbone     bilateral    HL (hearing loss) 2017    Have had tinnitus all of my life.    Left knee DJD     Low back pain     Lumbar herniated disc     L3 & L5, h/o lumbar epidurals    Mixed hyperlipidemia     Multiple endocrine neoplasia     Myocardial infarction     Anterior MI per electrocardiogram, Dr Baron follows    Obesity     Obstructive  sleep apnea on CPAP 2013    Sleep Study completed by Dr. Bennett    Pneumonia 2024    S/P cervical spinal fusion     C 4-5,     Sciatic pain     right leg    Small bowel obstruction     Testosterone deficiency 2006    Low t    Type 2 diabetes mellitus with hyperglycemia     Type 2 diabetes mellitus with vascular disease        Allergies   Allergen Reactions    Farxiga [Dapagliflozin] Rash     Penile yeast infection       Past Surgical History:   Procedure Laterality Date    CARDIAC CATHETERIZATION  2011    CERVICAL FUSION  2014    cervical fusion    COLONOSCOPY  2010    COLONOSCOPY N/A 2016    Procedure: COLONOSCOPY with polypectomy/tattooing;  Surgeon: Jonna Osborn MD;  Location:  [facility];  Service:     COLONOSCOPY Left 2021    Procedure: COLONOSCOPY FOR SCREENING;  Surgeon: Jayden Solis MD;  Location: [facility] MAIN OR;  Service: Gastroenterology;  Laterality: Left;    EPIDURAL      lumbar epidurals    JOINT REPLACEMENT  10-    rt. knee replacement    PILONIDAL CYSTECTOMY      x4    MD ARTHRP KNE CONDYLE&PLATU MEDIAL&LAT COMPARTMENTS Right 10/17/2016    Procedure: TOTAL KNEE ARTHROPLASTY  KRIS--ANTIBIOTIC CEMENT ;  Surgeon: Roger Stone MD;  Location:  [facility];  Service: Orthopedics    SEPTOPLASTY Bilateral 2018    Procedure: NASAL SEPTOPLASTY BILATERAL INFERIOR TURBINECTOMY;  Surgeon: Adiel Thompson MD;  Location:  [facility];  Service: ENT    SINUS SURGERY  10/6/2017    SPINAL FUSION  2014    disk fusion in neck       Family History   Problem Relation Age of Onset    Macular degeneration Mother         2018    Dementia Mother     Stroke Mother     Diabetes Mother     Hearing loss Mother     Hyperlipidemia Mother     Osteoporosis Mother     Heart attack Father         age 85,  sudden at home    Heart failure Father     Macular degeneration Father     Cancer Father     Diabetes Father         borderline     Hypertension Father     Hearing loss Father     Hyperlipidemia Father     Heart disease Father     Vision loss Father     Arthritis Father     Arthritis Sister     Rheumatologic disease Sister         RA    Hypertension Brother     Alcohol abuse Brother     Drug abuse Brother     Arthritis Maternal Grandmother     Drug abuse Brother     Hyperlipidemia Brother     Drug abuse Brother     Malig Hyperthermia Neg Hx     Colon cancer Neg Hx     Colon polyps Neg Hx        Social History     Socioeconomic History    Marital status:      Spouse name:     Number of children: 4    Years of education: Some College   Tobacco Use    Smoking status: Former     Current packs/day: 0.00     Average packs/day: 3.0 packs/day for 20.3 years (61.0 ttl pk-yrs)     Types: Cigarettes     Start date: 6/15/1979     Quit date: 10/15/1999     Years since quittin.7    Smokeless tobacco: Never   Vaping Use    Vaping status: Never Used   Substance and Sexual Activity    Alcohol use: Yes     Alcohol/week: 1.0 standard drink of alcohol     Types: 1 Cans of beer per week     Comment: Use to be a moderate drinker    Drug use: No    Sexual activity: Not Currently     Partners: Female     Birth control/protection: Rhythm           Objective   Physical Exam  Vitals and nursing note reviewed.   Constitutional:       General: He is not in acute distress.     Appearance: He is not ill-appearing, toxic-appearing or diaphoretic.   HENT:      Right Ear: Tympanic membrane, ear canal and external ear normal. There is no impacted cerumen.      Left Ear: Tympanic membrane, ear canal and external ear normal. There is no impacted cerumen.      Nose: Nose normal.      Mouth/Throat:      Mouth: Mucous membranes are moist.   Eyes:      General:         Right eye: No discharge.         Left eye: No discharge.      Extraocular Movements: Extraocular movements intact.      Pupils: Pupils are equal, round, and reactive to light.   Neck:      Vascular: No  carotid bruit.   Cardiovascular:      Rate and Rhythm: Normal rate and regular rhythm.      Pulses: Normal pulses.      Heart sounds: Normal heart sounds.   Pulmonary:      Effort: Pulmonary effort is normal.      Breath sounds: Normal breath sounds.   Abdominal:      General: Abdomen is flat.      Palpations: Abdomen is soft.   Musculoskeletal:         General: Swelling, tenderness and signs of injury present. Normal range of motion.      Cervical back: Normal range of motion. No rigidity or tenderness.      Left lower leg: No edema.   Lymphadenopathy:      Cervical: No cervical adenopathy.   Skin:     General: Skin is warm.      Capillary Refill: Capillary refill takes less than 2 seconds.      Coloration: Skin is not jaundiced or pale.      Findings: Erythema present. No bruising, lesion or rash.   Neurological:      General: No focal deficit present.      Mental Status: He is alert.      Sensory: No sensory deficit.      Motor: No weakness.      Coordination: Coordination normal.      Gait: Gait normal.      Deep Tendon Reflexes: Reflexes normal.   Psychiatric:         Mood and Affect: Mood normal.         Behavior: Behavior normal.         Thought Content: Thought content normal.         Judgment: Judgment normal.         Laceration Repair    Date/Time: 7/4/2025 7:22 PM    Performed by: Isabel Metcalf APRN  Authorized by: Terrell Torres MD    Consent:     Consent obtained:  Verbal and written    Consent given by:  Patient    Risks, benefits, and alternatives were discussed: yes      Risks discussed:  Infection, pain, need for additional repair, poor cosmetic result, tendon damage, nerve damage, poor wound healing and vascular damage    Alternatives discussed:  No treatment and delayed treatment  Universal protocol:     Procedure explained and questions answered to patient or proxy's satisfaction: yes      Patient identity confirmed:  Verbally with patient and arm band  Anesthesia:     Anesthesia  method:  Topical application and local infiltration    Topical anesthetic:  LET    Local anesthetic:  Lidocaine 1% w/o epi  Laceration details:     Location:  Hand    Length (cm):  4  Pre-procedure details:     Preparation:  Patient was prepped and draped in usual sterile fashion  Exploration:     Limited defect created (wound extended): no      Hemostasis achieved with:  LET    Imaging outcome: foreign body noted      Wound exploration: wound explored through full range of motion and entire depth of wound visualized      Contaminated: yes    Treatment:     Area cleansed with:  Povidone-iodine, saline, chlorhexidine and Shur-Clens    Amount of cleaning:  Extensive    Irrigation solution:  Sterile water    Irrigation volume:  100    Irrigation method:  Pressure wash    Visualized foreign bodies/material removed: yes      Debridement:  Minimal    Undermining:  None    Scar revision: no    Skin repair:     Repair method:  Sutures    Suture size:  4-0    Suture technique:  Simple interrupted    Number of sutures:  13  Approximation:     Approximation:  Close  Repair type:     Repair type:  Simple  Post-procedure details:     Dressing:  Antibiotic ointment, non-adherent dressing and splint for protection    Procedure completion:  Tolerated well, no immediate complications  Comments:      There were 3 lacerations present.  One on the posterior fifth digit that was approximately 3 cm 4 sutures were placed, 1 cm cm laceration to the palm at the distal end of the posterior surface of the left fifth digit 2 sutures were placed, and a 4 cm laceration to the palm of his hand 7 sutures placed.             ED Course                                           Medical Decision Making  Patient is a 64-year-old male with history of anemia, anxiety, asthma, MI, hyperlipidemia, pneumonia, diabetes who presents today with multiple injuries related to a trip and fall at the swimming pool today.  He reports he fell and landed on the left  side of his body.  He struck his forehead causing a small laceration to his left eyebrow.  He has a laceration to his left hand, and 2 for his palm.  He has an abrasion to his left knee, and abdomen.  He denies loss of consciousness, blurred vision, diplopia, neck pain, numbness, tingling or loss of sensation, nausea, vomiting, diarrhea, chest pain or shortness of air.  He reports he is on 81 mg of aspirin daily.    Upon exam patient is awake and alert, nontoxic-appearing, appears in no acute distress, and is answering questions appropriately.  Lung sounds are clear and equal bilaterally.  Heart is normal rate and rhythm.  Mucous membranes are moist, oropharynx is free of erythema, exudate, lesions, uvula is midline, tonsils are 1+.  I was able to visualize bilateral TMs and they were normal.  Neuroexam is normal.  I do not appreciate any vertebral point tenderness.  He has a skin abrasion to his left knee.  He has full range of motion of the knee, and sensation is intact.  He has multiple abrasions to his abdomen.  Patient has 3 lacerations to left hand.  One near the PIP joint of his left fifth digit to the posterior portion, a laceration towards the distal end of the fifth digit that is located on the palm that was approximately a centimeter in length, and a 4 cm laceration to his palm.  Sensation is intact.  He has full range of motion.  Cap refills less than 2.  He has a strong radial pulse.  Copious amount of irrigation was completed and I was able to close lacerations as described in my procedure note.  CT was obtained of his head neck and chest and showed nothing acute.  He was given a dose of Keflex here and prescribed the same.  I discussed strict return precautions, and follow-up.  Advised him to follow-up with his primary care, and return to the ER for any new or worsening symptoms.    Problems Addressed:  Fall, initial encounter: complicated acute illness or injury  Injury of head, initial encounter:  complicated acute illness or injury  Multiple abrasions: complicated acute illness or injury  Multiple lacerations: complicated acute illness or injury    Amount and/or Complexity of Data Reviewed  Radiology: ordered.    Risk  OTC drugs.  Prescription drug management.        Final diagnoses:   Fall, initial encounter   Multiple lacerations   Injury of head, initial encounter   Multiple abrasions       ED Disposition  ED Disposition       ED Disposition   Discharge    Condition   Stable    Comment   --               Lalito Hansen MD  [address] [facility] [address]  [phone]          KLEINERT KUTZ [facility]  [address] [place] [address] [address]  [facility] [id number] [phone]  In 1 week  As needed, If symptoms worsen         Medication List        New Prescriptions      cephalexin 500 MG capsule  Commonly known as: KEFLEX  Take 1 capsule by mouth 4 (Four) Times a Day for 5 days.               Where to Get Your Medications        These medications were sent to [id number] - [place], [place] - [address] [place] [address] [address]  - [phone]  - [phone] [address] [place] [address]      Phone: [phone]   cephalexin 500 MG capsule

## 2025-07-04 NOTE — DISCHARGE INSTRUCTIONS
Keep wound covered whenever you are out and about.    Keep bandage in place for the next 24 to 48 hours.  Unless it becomes soiled you can remove it.    When she remove the bandage wash with warm soapy water and apply a thin layer of an antibiotic ointment of your choice to the area.    Take antibiotics as prescribed unless you have an allergic reaction.    Avoid submerging your hand into any standing water such as a bathtub, river, lake, ocean, swimming pool or hot tub as this puts you at risk for infection.    Have sutures removed in 7 to 10 days.    Watch for signs of infection such as an increase in swelling, redness, drainage, foul odor, red streaking, fever, chills, nausea, vomiting.    Return to the ER with any new or worsening symptoms.

## 2025-07-06 DIAGNOSIS — M51.26 LUMBAR HERNIATED DISC: ICD-10-CM

## 2025-07-06 RX ORDER — DULOXETIN HYDROCHLORIDE 60 MG/1
60 CAPSULE, DELAYED RELEASE ORAL DAILY
Qty: 90 CAPSULE | Refills: 3 | Status: SHIPPED | OUTPATIENT
Start: 2025-07-06

## 2025-08-27 ENCOUNTER — OFFICE VISIT (OUTPATIENT)
Dept: FAMILY MEDICINE CLINIC | Facility: CLINIC | Age: 65
End: 2025-08-27
Payer: COMMERCIAL

## 2025-08-27 VITALS
DIASTOLIC BLOOD PRESSURE: 70 MMHG | HEART RATE: 82 BPM | HEIGHT: 68 IN | SYSTOLIC BLOOD PRESSURE: 130 MMHG | TEMPERATURE: 99.1 F | BODY MASS INDEX: 36.83 KG/M2 | OXYGEN SATURATION: 96 % | WEIGHT: 243 LBS

## 2025-08-27 DIAGNOSIS — E11.65 TYPE 2 DIABETES MELLITUS WITH HYPERGLYCEMIA, WITHOUT LONG-TERM CURRENT USE OF INSULIN: ICD-10-CM

## 2025-08-27 DIAGNOSIS — E78.2 MIXED HYPERLIPIDEMIA: ICD-10-CM

## 2025-08-27 DIAGNOSIS — Z23 NEED FOR PNEUMOCOCCAL VACCINATION: Primary | ICD-10-CM

## 2025-08-27 DIAGNOSIS — Z00.00 ROUTINE ADULT HEALTH MAINTENANCE: ICD-10-CM

## 2025-08-27 DIAGNOSIS — Z12.5 SCREENING FOR PROSTATE CANCER: ICD-10-CM

## 2025-08-27 DIAGNOSIS — D64.9 LOW HEMOGLOBIN: ICD-10-CM

## 2025-08-27 RX ORDER — SEMAGLUTIDE 2.68 MG/ML
2 INJECTION, SOLUTION SUBCUTANEOUS WEEKLY
Qty: 9 ML | Refills: 3 | Status: SHIPPED | OUTPATIENT
Start: 2025-08-27 | End: 2026-07-23

## 2025-08-27 RX ORDER — METHOCARBAMOL 500 MG/1
500 TABLET, FILM COATED ORAL 3 TIMES DAILY
COMMUNITY
Start: 2025-08-01

## 2025-08-27 RX ORDER — SEMAGLUTIDE 2.68 MG/ML
2 INJECTION, SOLUTION SUBCUTANEOUS WEEKLY
COMMUNITY
Start: 2025-08-17 | End: 2025-08-27 | Stop reason: SDUPTHER

## (undated) DEVICE — GLV SURG TRIUMPH CLASSIC PF LTX 6.5 STRL

## (undated) DEVICE — ANTI-FOG SOLUTION WITH FOAM PAD: Brand: DEVON

## (undated) DEVICE — GLV SURG TRIUMPH CLASSIC PF LTX 7.5 STRL

## (undated) DEVICE — BLADE 1884004 TRICUT 5PK 4MM: Brand: TRICUT®

## (undated) DEVICE — Device

## (undated) DEVICE — COLLECTION SOCK, GENERAL: Brand: DEROYAL

## (undated) DEVICE — JACKT LAB F/R KNIT CUFF/COLR XLG BLU

## (undated) DEVICE — COVER,MAYO STAND,STERILE: Brand: MEDLINE

## (undated) DEVICE — SUT SILK 3/0 FS1 18IN 684H

## (undated) DEVICE — KT ORCA ORCAPOD DISP STRL

## (undated) DEVICE — MAGNETIC DRAPE: Brand: DEVON

## (undated) DEVICE — CATH IV INSYTE AUTOGARD 14G 1 1/2IN ORNG

## (undated) DEVICE — PK ENT FESS 40

## (undated) DEVICE — GOWN,NON-REINFORCED,SIRUS,SET IN SLV,XL: Brand: MEDLINE

## (undated) DEVICE — CANN NASL CO2 TRULINK W/O2 A/